# Patient Record
Sex: FEMALE | Race: WHITE | NOT HISPANIC OR LATINO | Employment: OTHER | URBAN - METROPOLITAN AREA
[De-identification: names, ages, dates, MRNs, and addresses within clinical notes are randomized per-mention and may not be internally consistent; named-entity substitution may affect disease eponyms.]

---

## 2017-01-25 ENCOUNTER — LAB VISIT (OUTPATIENT)
Dept: LAB | Facility: HOSPITAL | Age: 57
End: 2017-01-25
Attending: FAMILY MEDICINE
Payer: MEDICARE

## 2017-01-25 DIAGNOSIS — E11.9 TYPE II OR UNSPECIFIED TYPE DIABETES MELLITUS WITHOUT MENTION OF COMPLICATION, NOT STATED AS UNCONTROLLED: ICD-10-CM

## 2017-01-25 DIAGNOSIS — E03.9 HYPOTHYROIDISM, UNSPECIFIED TYPE: ICD-10-CM

## 2017-01-25 DIAGNOSIS — G89.4 CHRONIC PAIN SYNDROME: ICD-10-CM

## 2017-01-25 DIAGNOSIS — M75.121 COMPLETE TEAR OF RIGHT ROTATOR CUFF: ICD-10-CM

## 2017-01-25 DIAGNOSIS — M51.36 DDD (DEGENERATIVE DISC DISEASE), LUMBAR: ICD-10-CM

## 2017-01-25 DIAGNOSIS — F33.2 SEVERE EPISODE OF RECURRENT MAJOR DEPRESSIVE DISORDER, WITHOUT PSYCHOTIC FEATURES: ICD-10-CM

## 2017-01-25 LAB
ALBUMIN SERPL BCP-MCNC: 4.1 G/DL
ALP SERPL-CCNC: 76 U/L
ALT SERPL W/O P-5'-P-CCNC: 22 U/L
ANION GAP SERPL CALC-SCNC: 9 MMOL/L
AST SERPL-CCNC: 24 U/L
BASOPHILS # BLD AUTO: 0.04 K/UL
BASOPHILS NFR BLD: 0.6 %
BILIRUB SERPL-MCNC: 0.4 MG/DL
BUN SERPL-MCNC: 15 MG/DL
CALCIUM SERPL-MCNC: 9.7 MG/DL
CHLORIDE SERPL-SCNC: 101 MMOL/L
CHOLEST/HDLC SERPL: 4.3 {RATIO}
CO2 SERPL-SCNC: 30 MMOL/L
CREAT SERPL-MCNC: 0.9 MG/DL
DIFFERENTIAL METHOD: ABNORMAL
EOSINOPHIL # BLD AUTO: 0.2 K/UL
EOSINOPHIL NFR BLD: 3.3 %
ERYTHROCYTE [DISTWIDTH] IN BLOOD BY AUTOMATED COUNT: 13.7 %
EST. GFR  (AFRICAN AMERICAN): >60 ML/MIN/1.73 M^2
EST. GFR  (NON AFRICAN AMERICAN): >60 ML/MIN/1.73 M^2
GLUCOSE SERPL-MCNC: 93 MG/DL
HCT VFR BLD AUTO: 43.1 %
HDL/CHOLESTEROL RATIO: 23.4 %
HDLC SERPL-MCNC: 222 MG/DL
HDLC SERPL-MCNC: 52 MG/DL
HGB BLD-MCNC: 14.2 G/DL
LDLC SERPL CALC-MCNC: 136 MG/DL
LYMPHOCYTES # BLD AUTO: 2 K/UL
LYMPHOCYTES NFR BLD: 31.1 %
MCH RBC QN AUTO: 31.8 PG
MCHC RBC AUTO-ENTMCNC: 32.9 %
MCV RBC AUTO: 97 FL
MONOCYTES # BLD AUTO: 0.5 K/UL
MONOCYTES NFR BLD: 7.3 %
NEUTROPHILS # BLD AUTO: 3.6 K/UL
NEUTROPHILS NFR BLD: 57.5 %
NONHDLC SERPL-MCNC: 170 MG/DL
PLATELET # BLD AUTO: 304 K/UL
PMV BLD AUTO: 9.6 FL
POTASSIUM SERPL-SCNC: 4.3 MMOL/L
PROT SERPL-MCNC: 7.2 G/DL
RBC # BLD AUTO: 4.46 M/UL
SODIUM SERPL-SCNC: 140 MMOL/L
T4 FREE SERPL-MCNC: 0.82 NG/DL
TRIGL SERPL-MCNC: 170 MG/DL
TSH SERPL DL<=0.005 MIU/L-ACNC: 2.02 UIU/ML
WBC # BLD AUTO: 6.28 K/UL

## 2017-01-25 PROCEDURE — 36415 COLL VENOUS BLD VENIPUNCTURE: CPT | Mod: PO

## 2017-01-25 PROCEDURE — 80061 LIPID PANEL: CPT

## 2017-01-25 PROCEDURE — 83036 HEMOGLOBIN GLYCOSYLATED A1C: CPT

## 2017-01-25 PROCEDURE — 80053 COMPREHEN METABOLIC PANEL: CPT

## 2017-01-25 PROCEDURE — 85025 COMPLETE CBC W/AUTO DIFF WBC: CPT

## 2017-01-25 PROCEDURE — 84439 ASSAY OF FREE THYROXINE: CPT

## 2017-01-25 PROCEDURE — 84443 ASSAY THYROID STIM HORMONE: CPT

## 2017-01-26 LAB
ESTIMATED AVG GLUCOSE: 123 MG/DL
HBA1C MFR BLD HPLC: 5.9 %

## 2017-01-30 ENCOUNTER — OFFICE VISIT (OUTPATIENT)
Dept: INTERNAL MEDICINE | Facility: CLINIC | Age: 57
End: 2017-01-30
Payer: MEDICARE

## 2017-01-30 VITALS
HEIGHT: 62 IN | BODY MASS INDEX: 36.35 KG/M2 | TEMPERATURE: 98 F | SYSTOLIC BLOOD PRESSURE: 126 MMHG | HEART RATE: 66 BPM | WEIGHT: 197.56 LBS | DIASTOLIC BLOOD PRESSURE: 78 MMHG

## 2017-01-30 DIAGNOSIS — R73.09 ABNORMAL GLUCOSE: ICD-10-CM

## 2017-01-30 DIAGNOSIS — B35.4 TINEA CORPORIS: ICD-10-CM

## 2017-01-30 DIAGNOSIS — M51.36 DDD (DEGENERATIVE DISC DISEASE), LUMBAR: Primary | ICD-10-CM

## 2017-01-30 DIAGNOSIS — F17.200 SMOKER: ICD-10-CM

## 2017-01-30 DIAGNOSIS — E78.5 DYSLIPIDEMIA: ICD-10-CM

## 2017-01-30 DIAGNOSIS — I10 ESSENTIAL HYPERTENSION: ICD-10-CM

## 2017-01-30 DIAGNOSIS — Z12.11 COLON CANCER SCREENING: ICD-10-CM

## 2017-01-30 PROCEDURE — 99214 OFFICE O/P EST MOD 30 MIN: CPT | Mod: S$GLB,,, | Performed by: FAMILY MEDICINE

## 2017-01-30 PROCEDURE — 3078F DIAST BP <80 MM HG: CPT | Mod: S$GLB,,, | Performed by: FAMILY MEDICINE

## 2017-01-30 PROCEDURE — 3074F SYST BP LT 130 MM HG: CPT | Mod: S$GLB,,, | Performed by: FAMILY MEDICINE

## 2017-01-30 PROCEDURE — 1159F MED LIST DOCD IN RCRD: CPT | Mod: S$GLB,,, | Performed by: FAMILY MEDICINE

## 2017-01-30 PROCEDURE — 99499 UNLISTED E&M SERVICE: CPT | Mod: S$GLB,,, | Performed by: FAMILY MEDICINE

## 2017-01-30 PROCEDURE — 80307 DRUG TEST PRSMV CHEM ANLYZR: CPT

## 2017-01-30 PROCEDURE — 99999 PR PBB SHADOW E&M-EST. PATIENT-LVL III: CPT | Mod: PBBFAC,,, | Performed by: FAMILY MEDICINE

## 2017-01-30 RX ORDER — ALPRAZOLAM 0.5 MG/1
0.5 TABLET ORAL DAILY PRN
Qty: 30 TABLET | Refills: 2 | Status: SHIPPED | OUTPATIENT
Start: 2017-01-30 | End: 2017-07-25 | Stop reason: SDUPTHER

## 2017-01-30 RX ORDER — NYSTATIN AND TRIAMCINOLONE ACETONIDE 100000; 1 [USP'U]/G; MG/G
CREAM TOPICAL 4 TIMES DAILY
Qty: 60 G | Refills: 3 | Status: ON HOLD | OUTPATIENT
Start: 2017-01-30 | End: 2020-01-09 | Stop reason: HOSPADM

## 2017-01-30 RX ORDER — HYDROCODONE BITARTRATE AND ACETAMINOPHEN 10; 325 MG/1; MG/1
1 TABLET ORAL EVERY 8 HOURS PRN
Qty: 75 TABLET | Refills: 0 | Status: SHIPPED | OUTPATIENT
Start: 2017-01-30 | End: 2017-03-01 | Stop reason: SDUPTHER

## 2017-01-30 NOTE — PROGRESS NOTES
Patient, Melita Romero (MRN #[id number]), presented with a recorded BMI of 36.13 kg/m^2 and a documented comorbidity(s):  - Hypertension  - Hyperlipidemia  to which the severe obesity is a contributing factor. This is consistent with the definition of severe obesity (BMI 35.0-35.9) with comorbidity (ICD-10 E66.01, Z68.35). The patient's severe obesity was monitored, evaluated, addressed and/or treated. This addendum to the medical record is made on 01/30/2017.
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
## 2017-02-05 LAB
6MAM UR QL: NOT DETECTED
7AMINOCLONAZEPAM UR QL: NOT DETECTED
A-OH ALPRAZ UR QL: NOT DETECTED
ALPRAZ UR QL: NOT DETECTED
AMPHET UR QL SCN: NOT DETECTED
ANNOTATION COMMENT IMP: NORMAL
ANNOTATION COMMENT IMP: NORMAL
BARBITURATES UR QL: PRESENT
BUPRENORPHINE UR QL: NOT DETECTED
BZE UR QL: NOT DETECTED
CARBOXYTHC UR QL: NOT DETECTED
CARISOPRODOL UR QL: NOT DETECTED
CLONAZEPAM UR QL: NOT DETECTED
CODEINE UR QL: NOT DETECTED
CREAT UR-MCNC: 44.7 MG/DL (ref 20–400)
DIAZEPAM UR QL: NOT DETECTED
ETHYL GLUCURONIDE UR QL: NOT DETECTED
FENTANYL UR QL: NOT DETECTED
HYDROCODONE UR QL: PRESENT
HYDROMORPHONE UR QL: NOT DETECTED
LORAZEPAM UR QL: NOT DETECTED
MDA UR QL: NOT DETECTED
MDEA UR QL: NOT DETECTED
MDMA UR QL: NOT DETECTED
MEPERIDINE UR QL: NOT DETECTED
METHADONE UR QL: NOT DETECTED
METHAMPHET UR QL: NOT DETECTED
MIDAZOLAM UR QL SCN: NOT DETECTED
MORPHINE UR QL: NOT DETECTED
NORBUPRENORPHINE UR QL CFM: NOT DETECTED
NORDIAZEPAM UR QL: NOT DETECTED
NORFENTANYL UR QL: NOT DETECTED
NORHYDROCODONE UR QL CFM: PRESENT
NOROXYCODONE UR QL CFM: NOT DETECTED
OXAZEPAM UR QL: NOT DETECTED
OXYCODONE UR QL: NOT DETECTED
OXYMORPHONE UR QL: NOT DETECTED
OXYMORPHONE UR QL: NOT DETECTED
PATHOLOGY STUDY: NORMAL
PCP UR QL: NOT DETECTED
PHENTERMINE UR QL: NOT DETECTED
PPAA UR QL: NOT DETECTED
PROPOXYPH UR QL: NOT DETECTED
SERVICE CMNT-IMP: NORMAL
TAPENTADOL UR QL SCN: NOT DETECTED
TAPENTADOL UR QL SCN: NOT DETECTED
TEMAZEPAM UR QL: NOT DETECTED
TRAMADOL UR QL: NOT DETECTED
ZOLPIDEM UR QL: NOT DETECTED

## 2017-02-16 NOTE — PROGRESS NOTES
Ok she needs to stop taking his headache medication because it has shown up on her drug screen two times but I was uncertain what the cause was for the abnormality in the drug screen till now. What she is taking is a controlled substance, and if it shows up again in her next urine drug screen without having a prescription for it ( barbituates) then she won't be able to get any more of her pain meds from me or her xanax. Please inform.

## 2017-02-16 NOTE — PROGRESS NOTES
Please find out if the patient has been taking any fioricets or medictions with bubital in them for headaches. If so I need to add to her med list and find out where she has received them from.

## 2017-02-27 PROBLEM — E66.01 SEVERE OBESITY (BMI 35.0-39.9) WITH COMORBIDITY: Status: ACTIVE | Noted: 2017-02-27

## 2017-02-27 PROBLEM — E11.9 CONTROLLED TYPE 2 DIABETES MELLITUS WITHOUT COMPLICATION, WITHOUT LONG-TERM CURRENT USE OF INSULIN: Status: ACTIVE | Noted: 2017-02-27

## 2017-02-27 PROBLEM — M75.121 COMPLETE TEAR OF RIGHT ROTATOR CUFF: Status: ACTIVE | Noted: 2017-02-27

## 2017-02-27 PROBLEM — I51.89 LEFT VENTRICULAR DIASTOLIC DYSFUNCTION WITH PRESERVED SYSTOLIC FUNCTION: Status: ACTIVE | Noted: 2017-02-27

## 2017-03-01 RX ORDER — METFORMIN HYDROCHLORIDE 500 MG/1
500 TABLET, EXTENDED RELEASE ORAL 2 TIMES DAILY WITH MEALS
Qty: 60 TABLET | Refills: 5 | Status: SHIPPED | OUTPATIENT
Start: 2017-03-01 | End: 2017-09-26 | Stop reason: SDUPTHER

## 2017-03-01 RX ORDER — PREGABALIN 100 MG/1
100 CAPSULE ORAL 3 TIMES DAILY
Qty: 90 CAPSULE | Refills: 0 | Status: SHIPPED | OUTPATIENT
Start: 2017-03-01 | End: 2017-03-30 | Stop reason: SDUPTHER

## 2017-03-01 RX ORDER — HYDROCODONE BITARTRATE AND ACETAMINOPHEN 10; 325 MG/1; MG/1
1 TABLET ORAL EVERY 8 HOURS PRN
Qty: 75 TABLET | Refills: 0 | Status: SHIPPED | OUTPATIENT
Start: 2017-03-01 | End: 2017-03-30 | Stop reason: SDUPTHER

## 2017-03-31 RX ORDER — PREGABALIN 100 MG/1
100 CAPSULE ORAL 3 TIMES DAILY
Qty: 90 CAPSULE | Refills: 0 | Status: SHIPPED | OUTPATIENT
Start: 2017-03-31 | End: 2017-04-25 | Stop reason: SDUPTHER

## 2017-03-31 RX ORDER — HYDROCODONE BITARTRATE AND ACETAMINOPHEN 10; 325 MG/1; MG/1
1 TABLET ORAL EVERY 8 HOURS PRN
Qty: 75 TABLET | Refills: 0 | Status: SHIPPED | OUTPATIENT
Start: 2017-03-31 | End: 2017-04-25 | Stop reason: SDUPTHER

## 2017-04-21 RX ORDER — SERTRALINE HYDROCHLORIDE 100 MG/1
200 TABLET, FILM COATED ORAL DAILY
Qty: 180 TABLET | Refills: 3 | Status: SHIPPED | OUTPATIENT
Start: 2017-04-21 | End: 2017-04-25 | Stop reason: SDUPTHER

## 2017-04-25 ENCOUNTER — OFFICE VISIT (OUTPATIENT)
Dept: INTERNAL MEDICINE | Facility: CLINIC | Age: 57
End: 2017-04-25
Payer: MEDICARE

## 2017-04-25 VITALS
BODY MASS INDEX: 36.91 KG/M2 | HEART RATE: 76 BPM | TEMPERATURE: 99 F | WEIGHT: 208.31 LBS | DIASTOLIC BLOOD PRESSURE: 84 MMHG | HEIGHT: 63 IN | SYSTOLIC BLOOD PRESSURE: 124 MMHG

## 2017-04-25 DIAGNOSIS — G89.4 CHRONIC PAIN SYNDROME: ICD-10-CM

## 2017-04-25 DIAGNOSIS — M51.36 DDD (DEGENERATIVE DISC DISEASE), LUMBAR: Primary | ICD-10-CM

## 2017-04-25 DIAGNOSIS — E11.9 CONTROLLED TYPE 2 DIABETES MELLITUS WITHOUT COMPLICATION, WITHOUT LONG-TERM CURRENT USE OF INSULIN: ICD-10-CM

## 2017-04-25 DIAGNOSIS — E78.5 HYPERLIPIDEMIA, UNSPECIFIED HYPERLIPIDEMIA TYPE: ICD-10-CM

## 2017-04-25 DIAGNOSIS — F41.1 GAD (GENERALIZED ANXIETY DISORDER): ICD-10-CM

## 2017-04-25 DIAGNOSIS — F33.1 MODERATE EPISODE OF RECURRENT MAJOR DEPRESSIVE DISORDER: ICD-10-CM

## 2017-04-25 PROCEDURE — 99499 UNLISTED E&M SERVICE: CPT | Mod: S$GLB,,, | Performed by: FAMILY MEDICINE

## 2017-04-25 PROCEDURE — 3074F SYST BP LT 130 MM HG: CPT | Mod: S$GLB,,, | Performed by: FAMILY MEDICINE

## 2017-04-25 PROCEDURE — 1160F RVW MEDS BY RX/DR IN RCRD: CPT | Mod: S$GLB,,, | Performed by: FAMILY MEDICINE

## 2017-04-25 PROCEDURE — 99999 PR PBB SHADOW E&M-EST. PATIENT-LVL III: CPT | Mod: PBBFAC,,, | Performed by: FAMILY MEDICINE

## 2017-04-25 PROCEDURE — 3079F DIAST BP 80-89 MM HG: CPT | Mod: S$GLB,,, | Performed by: FAMILY MEDICINE

## 2017-04-25 PROCEDURE — 3044F HG A1C LEVEL LT 7.0%: CPT | Mod: S$GLB,,, | Performed by: FAMILY MEDICINE

## 2017-04-25 PROCEDURE — 4010F ACE/ARB THERAPY RXD/TAKEN: CPT | Mod: S$GLB,,, | Performed by: FAMILY MEDICINE

## 2017-04-25 PROCEDURE — 80307 DRUG TEST PRSMV CHEM ANLYZR: CPT

## 2017-04-25 PROCEDURE — 99214 OFFICE O/P EST MOD 30 MIN: CPT | Mod: S$GLB,,, | Performed by: FAMILY MEDICINE

## 2017-04-25 RX ORDER — PREGABALIN 100 MG/1
100 CAPSULE ORAL 3 TIMES DAILY
Qty: 90 CAPSULE | Refills: 5 | Status: SHIPPED | OUTPATIENT
Start: 2017-04-25 | End: 2017-11-06 | Stop reason: SDUPTHER

## 2017-04-25 RX ORDER — HYDROCODONE BITARTRATE AND ACETAMINOPHEN 10; 325 MG/1; MG/1
1 TABLET ORAL EVERY 8 HOURS PRN
Qty: 75 TABLET | Refills: 0 | Status: SHIPPED | OUTPATIENT
Start: 2017-04-29 | End: 2017-06-07 | Stop reason: SDUPTHER

## 2017-04-25 RX ORDER — SERTRALINE HYDROCHLORIDE 100 MG/1
200 TABLET, FILM COATED ORAL DAILY
Qty: 180 TABLET | Refills: 3 | Status: SHIPPED | OUTPATIENT
Start: 2017-04-25 | End: 2018-07-12 | Stop reason: SDUPTHER

## 2017-04-25 NOTE — PROGRESS NOTES
Subjective:      Patient ID: Melita Romero is a 56 y.o. female.    Chief Complaint: Follow-up (3 month )    HPI Comments: pt is a 56-year-old coming in today for followup of chronic issues.       NALLELY/MDD- stable lately.  Now in a mobile home.  Still on sertraline and Xanax.  States her last use of Xanax was either yesterday or 2 days ago.  Only using it intermittently.  Needing a refill of sertraline.      The patient had degenerative disc disease of the lumbar spine for several years now.  She has been well maintained on Norco anywhere from 2-3 tablets a day.  Needing UDS. Last one was positive for barbituates.  She reports that she did take some of her 's your set which had U Bhatti in it.  The last 3 months she has not taken any further.  She states she does not want her own prescription at this time for it.  For the most part the Norco seems to be helping her back pain and keeps her stable.  She has a rotator cuff tear on the right side and has recently rescheduled her appointment with orthopedics for next week with Dr. Delgado.  She is also on Lyrica which is helping which she takes 100 mg 3 times a day.    HTN- currently well controlled. Obese, still smoking.       She also has hyperlipidemia , on lipitor 40mg. No med problems.     She is obese with diabetes.  On metformin, needs improvement of diet.       Medication Refill   This is a chronic problem. The current episode started more than 1 year ago. The problem occurs 2 to 4 times per day. The problem has been unchanged. Associated symptoms include arthralgias, myalgias and neck pain. Pertinent negatives include no abdominal pain, anorexia, change in bowel habit, chest pain, chills, congestion, coughing, fatigue, headaches, nausea, numbness, rash, swollen glands or weakness. The symptoms are aggravated by bending, standing, twisting and walking. She has tried NSAIDs, ice, heat and acetaminophen (norco) for the symptoms. The treatment provided  significant relief.       Lab Results   Component Value Date    WBC 6.28 01/25/2017    HGB 14.2 01/25/2017    HCT 43.1 01/25/2017     01/25/2017    CHOL 222 (H) 01/25/2017    TRIG 170 (H) 01/25/2017    HDL 52 01/25/2017    ALT 22 01/25/2017    AST 24 01/25/2017     01/25/2017    K 4.3 01/25/2017     01/25/2017    CREATININE 0.9 01/25/2017    BUN 15 01/25/2017    CO2 30 (H) 01/25/2017    TSH 2.015 01/25/2017    HGBA1C 5.9 01/25/2017       Review of Systems   Constitutional: Negative for activity change, appetite change, chills and fatigue.   HENT: Negative for congestion.    Respiratory: Negative for cough and shortness of breath.    Cardiovascular: Negative for chest pain and palpitations.   Gastrointestinal: Negative for abdominal pain, anorexia, change in bowel habit, constipation and nausea.   Musculoskeletal: Positive for arthralgias, myalgias and neck pain.   Skin: Negative for rash.   Neurological: Negative for weakness, light-headedness, numbness and headaches.   Psychiatric/Behavioral: Negative for decreased concentration, dysphoric mood and sleep disturbance. The patient is nervous/anxious.      Objective:     Physical Exam   Constitutional: She appears well-developed and well-nourished.   MO WF   HENT:   Head: Normocephalic and atraumatic.   Right Ear: Tympanic membrane normal.   Left Ear: Tympanic membrane normal.   Mouth/Throat: Oropharynx is clear and moist.   Eyes: Conjunctivae and EOM are normal.   Neck: Normal range of motion. Neck supple.   Cardiovascular: Normal rate and regular rhythm.    Pulmonary/Chest: Effort normal and breath sounds normal.   Musculoskeletal:        Lumbar back: She exhibits decreased range of motion, tenderness and bony tenderness.   Psychiatric: She has a normal mood and affect. Her behavior is normal.   Nursing note and vitals reviewed.    Assessment:     1. DDD (degenerative disc disease), lumbar    2. NALLELY (generalized anxiety disorder)    3. Moderate  episode of recurrent major depressive disorder    4. Chronic pain syndrome    5. Hyperlipidemia, unspecified hyperlipidemia type    6. Controlled type 2 diabetes mellitus without complication, without long-term current use of insulin      Plan:   Melita was seen today for follow-up.    Diagnoses and all orders for this visit:    DDD (degenerative disc disease), lumbar- - stable, Continue with current medications and interventions. Labs ordered for UDS. Refilled meds of norco at #75 and lyrica for 6 mo     -     Pain Clinic Drug Screen  -     Hemoglobin A1c; Future  -     Lipid panel; Future  -     Comprehensive metabolic panel; Future  -     TSH; Future  -     T4, free; Future  -     CBC auto differential; Future    NALLELY (generalized anxiety disorder)- - stable, Continue with current medications and interventions. uds ordered. Last xanax use 1-2 days ago. Should be + for alprazolam.     -     Hemoglobin A1c; Future  -     Lipid panel; Future  -     Comprehensive metabolic panel; Future  -     TSH; Future  -     T4, free; Future  -     CBC auto differential; Future    Moderate episode of recurrent major depressive disorder - stable, Continue with current medications and interventions. Labs reviewed.     -     Hemoglobin A1c; Future  -     Lipid panel; Future  -     Comprehensive metabolic panel; Future  -     TSH; Future  -     T4, free; Future  -     CBC auto differential; Future    Chronic pain syndrome uds today. Refilled meds.   -     Pain Clinic Drug Screen  -     Hemoglobin A1c; Future  -     Lipid panel; Future  -     Comprehensive metabolic panel; Future  -     TSH; Future  -     T4, free; Future  -     CBC auto differential; Future    Hyperlipidemia, unspecified hyperlipidemia type  -     Hemoglobin A1c; Future  -     Lipid panel; Future  -     Comprehensive metabolic panel; Future  -     TSH; Future  -     T4, free; Future  -     CBC auto differential; Future    Controlled type 2 diabetes mellitus without  complication, without long-term current use of insulin- Labs prior to next office visit.     -     Hemoglobin A1c; Future  -     Lipid panel; Future  -     Comprehensive metabolic panel; Future  -     TSH; Future  -     T4, free; Future  -     CBC auto differential; Future    Other orders  -     sertraline (ZOLOFT) 100 MG tablet; Take 2 tablets (200 mg total) by mouth once daily.  -     pregabalin (LYRICA) 100 MG capsule; Take 1 capsule (100 mg total) by mouth 3 (three) times daily.  -     hydrocodone-acetaminophen 10-325mg (NORCO)  mg Tab; Take 1 tablet by mouth every 8 (eight) hours as needed.          Return in about 3 months (around 7/25/2017) for pain, dm, chol, bp.

## 2017-05-01 LAB
6MAM UR QL: NOT DETECTED
7AMINOCLONAZEPAM UR QL: NOT DETECTED
A-OH ALPRAZ UR QL: PRESENT
ALPRAZ UR QL: NOT DETECTED
AMPHET UR QL SCN: NOT DETECTED
ANNOTATION COMMENT IMP: NORMAL
ANNOTATION COMMENT IMP: NORMAL
BARBITURATES UR QL: NOT DETECTED
BUPRENORPHINE UR QL: NOT DETECTED
BZE UR QL: NOT DETECTED
CARBOXYTHC UR QL: NOT DETECTED
CARISOPRODOL UR QL: NOT DETECTED
CLONAZEPAM UR QL: NOT DETECTED
CODEINE UR QL: NOT DETECTED
CREAT UR-MCNC: 25.6 MG/DL (ref 20–400)
DIAZEPAM UR QL: NOT DETECTED
ETHYL GLUCURONIDE UR QL: NOT DETECTED
FENTANYL UR QL: NOT DETECTED
HYDROCODONE UR QL: PRESENT
HYDROMORPHONE UR QL: NOT DETECTED
LORAZEPAM UR QL: NOT DETECTED
MDA UR QL: NOT DETECTED
MDEA UR QL: NOT DETECTED
MDMA UR QL: NOT DETECTED
MEPERIDINE UR QL: NOT DETECTED
METHADONE UR QL: NOT DETECTED
METHAMPHET UR QL: NOT DETECTED
MIDAZOLAM UR QL SCN: NOT DETECTED
MORPHINE UR QL: NOT DETECTED
NORBUPRENORPHINE UR QL CFM: NOT DETECTED
NORDIAZEPAM UR QL: NOT DETECTED
NORFENTANYL UR QL: NOT DETECTED
NORHYDROCODONE UR QL CFM: PRESENT
NOROXYCODONE UR QL CFM: NOT DETECTED
OXAZEPAM UR QL: NOT DETECTED
OXYCODONE UR QL: NOT DETECTED
OXYMORPHONE UR QL: NOT DETECTED
OXYMORPHONE UR QL: NOT DETECTED
PATHOLOGY STUDY: NORMAL
PCP UR QL: NOT DETECTED
PHENTERMINE UR QL: NOT DETECTED
PPAA UR QL: NOT DETECTED
PROPOXYPH UR QL: NOT DETECTED
SERVICE CMNT-IMP: NORMAL
TAPENTADOL UR QL SCN: NOT DETECTED
TAPENTADOL UR QL SCN: NOT DETECTED
TEMAZEPAM UR QL: NOT DETECTED
TRAMADOL UR QL: NOT DETECTED
ZOLPIDEM UR QL: NOT DETECTED

## 2017-05-04 ENCOUNTER — TELEPHONE (OUTPATIENT)
Dept: INTERNAL MEDICINE | Facility: CLINIC | Age: 57
End: 2017-05-04

## 2017-05-04 NOTE — TELEPHONE ENCOUNTER
Called and got pt booked for pre op. She is having her testing done at [facility] on tomorrow and they will fax us results on Monday.

## 2017-05-04 NOTE — TELEPHONE ENCOUNTER
----- Message from Denisse Nevarez sent at 5/4/2017  8:21 AM CDT -----  Contact: Rea Lucia at Dr Tate office would like to schedule a surgery clearance,surgery is on the 16th....[phone]

## 2017-05-09 ENCOUNTER — OFFICE VISIT (OUTPATIENT)
Dept: INTERNAL MEDICINE | Facility: CLINIC | Age: 57
End: 2017-05-09
Payer: MEDICARE

## 2017-05-09 ENCOUNTER — HOSPITAL ENCOUNTER (OUTPATIENT)
Dept: RADIOLOGY | Facility: HOSPITAL | Age: 57
Discharge: HOME | End: 2017-05-09
Attending: FAMILY MEDICINE
Payer: MEDICARE

## 2017-05-09 VITALS
TEMPERATURE: 98 F | DIASTOLIC BLOOD PRESSURE: 80 MMHG | WEIGHT: 205.69 LBS | BODY MASS INDEX: 36.45 KG/M2 | HEART RATE: 78 BPM | HEIGHT: 63 IN | SYSTOLIC BLOOD PRESSURE: 122 MMHG

## 2017-05-09 DIAGNOSIS — Z01.818 PREOP EXAM FOR INTERNAL MEDICINE: Primary | ICD-10-CM

## 2017-05-09 DIAGNOSIS — G89.29 OTHER CHRONIC PAIN: ICD-10-CM

## 2017-05-09 DIAGNOSIS — E11.9 CONTROLLED TYPE 2 DIABETES MELLITUS WITHOUT COMPLICATION, WITHOUT LONG-TERM CURRENT USE OF INSULIN: ICD-10-CM

## 2017-05-09 DIAGNOSIS — E78.2 MIXED HYPERLIPIDEMIA: ICD-10-CM

## 2017-05-09 DIAGNOSIS — M75.121 COMPLETE TEAR OF RIGHT ROTATOR CUFF: ICD-10-CM

## 2017-05-09 DIAGNOSIS — F17.200 SMOKER: ICD-10-CM

## 2017-05-09 DIAGNOSIS — I10 ESSENTIAL HYPERTENSION: ICD-10-CM

## 2017-05-09 DIAGNOSIS — Z01.818 PREOP EXAM FOR INTERNAL MEDICINE: ICD-10-CM

## 2017-05-09 PROCEDURE — 99214 OFFICE O/P EST MOD 30 MIN: CPT | Mod: S$GLB,,, | Performed by: FAMILY MEDICINE

## 2017-05-09 PROCEDURE — 71020 XR CHEST PA AND LATERAL: CPT | Mod: 26,,, | Performed by: RADIOLOGY

## 2017-05-09 PROCEDURE — 3079F DIAST BP 80-89 MM HG: CPT | Mod: S$GLB,,, | Performed by: FAMILY MEDICINE

## 2017-05-09 PROCEDURE — 4010F ACE/ARB THERAPY RXD/TAKEN: CPT | Mod: S$GLB,,, | Performed by: FAMILY MEDICINE

## 2017-05-09 PROCEDURE — 71020 XR CHEST PA AND LATERAL: CPT | Mod: TC,PO

## 2017-05-09 PROCEDURE — 99499 UNLISTED E&M SERVICE: CPT | Mod: S$GLB,,, | Performed by: FAMILY MEDICINE

## 2017-05-09 PROCEDURE — 99999 PR PBB SHADOW E&M-EST. PATIENT-LVL III: CPT | Mod: PBBFAC,,, | Performed by: FAMILY MEDICINE

## 2017-05-09 PROCEDURE — 3044F HG A1C LEVEL LT 7.0%: CPT | Mod: S$GLB,,, | Performed by: FAMILY MEDICINE

## 2017-05-09 PROCEDURE — 1160F RVW MEDS BY RX/DR IN RCRD: CPT | Mod: S$GLB,,, | Performed by: FAMILY MEDICINE

## 2017-05-09 PROCEDURE — 3074F SYST BP LT 130 MM HG: CPT | Mod: S$GLB,,, | Performed by: FAMILY MEDICINE

## 2017-05-09 NOTE — PROGRESS NOTES
Subjective:      Patient ID: Melita Romero is a 56 y.o. female.    Chief Complaint: Pre-op Exam    HPI Comments: Pt is here for  preop clearance for right shoulder scope surgery by Dr Nima Delgado. Doing well. Scheduled for May 16th. No prior hx of blood clot, no PE, No CP, No SOB.  She's been battling pain and limitation of motion with her right shoulder for quite some time now.  The pain has gotten so severe to the point that she's now willing to pursue surgery.  Surgery scheduled for May 16.  She's chronically on hydrocodone 75 tablets per month that lately having to increase up to 3 times a day because of the significant amount of pain in her right shoulder.  She has a history of hypertension hyperlipidemia and diabetes control with metformin general anxiety and depression disorders as well.  She's a chronic smoker as well.  I reviewed her blood work done at the [facility] as well as her EKG she's needing do a chest x-ray due to her history of smoking.  Last chest x-ray was without any acute infections.      Lab Results   Component Value Date    WBC 6.28 01/25/2017    HGB 14.2 01/25/2017    HCT 43.1 01/25/2017     01/25/2017    CHOL 222 (H) 01/25/2017    TRIG 170 (H) 01/25/2017    HDL 52 01/25/2017    ALT 22 01/25/2017    AST 24 01/25/2017     01/25/2017    K 4.3 01/25/2017     01/25/2017    CREATININE 0.9 01/25/2017    BUN 15 01/25/2017    CO2 30 (H) 01/25/2017    TSH 2.015 01/25/2017    HGBA1C 5.9 01/25/2017       Review of Systems   Constitutional: Positive for activity change. Negative for unexpected weight change.   HENT: Negative for hearing loss, rhinorrhea and trouble swallowing.    Eyes: Negative for discharge and visual disturbance.   Respiratory: Negative for chest tightness and wheezing.    Cardiovascular: Negative for chest pain and palpitations.   Gastrointestinal: Negative for blood in stool, constipation, diarrhea and vomiting.   Endocrine: Negative for polydipsia and  polyuria.   Genitourinary: Negative for difficulty urinating, dysuria, hematuria and menstrual problem.   Musculoskeletal: Positive for arthralgias and neck pain. Negative for joint swelling and neck stiffness.   Neurological: Positive for weakness. Negative for headaches.   Psychiatric/Behavioral: Negative for confusion and dysphoric mood. The patient is nervous/anxious.      Objective:     Physical Exam   Constitutional: She appears well-developed and well-nourished.   HENT:   Head: Normocephalic and atraumatic.   Right Ear: Tympanic membrane normal.   Left Ear: Tympanic membrane normal.   Mouth/Throat: Oropharynx is clear and moist.   Eyes: Conjunctivae and EOM are normal.   Neck: Normal range of motion. Neck supple.   Cardiovascular: Normal rate and regular rhythm.    Pulmonary/Chest: Effort normal and breath sounds normal.   Musculoskeletal:        Right shoulder: She exhibits decreased range of motion, tenderness, bony tenderness, pain and spasm.   Psychiatric: She has a normal mood and affect. Her behavior is normal.   Nursing note and vitals reviewed.    Assessment:     1. Preop exam for internal medicine    2. Complete tear of right rotator cuff    3. Controlled type 2 diabetes mellitus without complication, without long-term current use of insulin    4. Essential hypertension    5. Mixed hyperlipidemia    6. Smoker    7. Other chronic pain      Plan:   Melita was seen today for pre-op exam.    Diagnoses and all orders for this visit:    Preop exam for internal medicine- I reviewed the patient's past medical, surgical, social and family history and with  physical exam findings and the proposed surgery and I make the following recommendations:     From a cardiac standpoint the patient is low risk for surgery with a low risk surgery. She has no evidence of cardiac symptomatology or cardiac diagnoses. The patient may proceed with surgery without further cardiac workup.     From a pulmonary standpoint the  patient presents as a good candidate as well. The patient has no history of lung disease or pulmonary symptoms. Good pulmonary toilet, incentive spirometry, early ambulation are all recommended to improve the  pulmonary outcome. No further pulmonary workup as noted prior to surgery.   DVT prophylaxis should be per standard. Venous compression devices are recommended. Early ambulation. Patient has been educated on signs and symptoms of both DVT and pulmonary embolus and instructed on what to do if there are symptoms postop.     The patient has been instructed to take  blood pressure medication the morning of surgery with a sip of water. Avoid any aspirin or anti-inflammatories between now and surgery.     If there is any further I can do to assist in the care of this patient please not hesitate to contact me. I will forward the lab results upon my receipt.      -     X-Ray Chest PA And Lateral; Future    Complete tear of right rotator cuff- going for scope by Dr Delgado next week.   -     X-Ray Chest PA And Lateral; Future    Controlled type 2 diabetes mellitus without complication, without long-term current use of insulin - - stable, Continue with current medications and interventions. Labs reviewed.       Essential hypertension- stable, Continue with current medications and interventions. Labs reviewed.       Mixed hyperlipidemia- stable, Continue with current medications and interventions. Labs reviewed.       Smoker- cxr today. Work on quiting /cutting back prior to surgery.   -     X-Ray Chest PA And Lateral; Future    Other chronic pain- advised pt to notify my office when she fills Rx from Dr Delgado on her norco for her surgery.             Return if symptoms worsen or fail to improve.

## 2017-06-07 RX ORDER — HYDROCODONE BITARTRATE AND ACETAMINOPHEN 10; 325 MG/1; MG/1
1 TABLET ORAL EVERY 8 HOURS PRN
Qty: 75 TABLET | Refills: 0 | Status: SHIPPED | OUTPATIENT
Start: 2017-06-07 | End: 2017-07-05 | Stop reason: SDUPTHER

## 2017-06-09 ENCOUNTER — CLINICAL SUPPORT (OUTPATIENT)
Dept: SMOKING CESSATION | Facility: CLINIC | Age: 57
End: 2017-06-09
Payer: COMMERCIAL

## 2017-06-09 DIAGNOSIS — F17.200 NICOTINE DEPENDENCE: Primary | ICD-10-CM

## 2017-06-09 PROCEDURE — 99407 BEHAV CHNG SMOKING > 10 MIN: CPT | Mod: S$GLB,,,

## 2017-06-09 NOTE — PROGRESS NOTES
Pt not ready to return to the program at this time. Contact information provided to schedule when ready.

## 2017-06-26 DIAGNOSIS — K21.00 GASTROESOPHAGEAL REFLUX DISEASE WITH ESOPHAGITIS: ICD-10-CM

## 2017-06-26 RX ORDER — ESOMEPRAZOLE MAGNESIUM 40 MG/1
40 CAPSULE, DELAYED RELEASE ORAL
Qty: 30 CAPSULE | Refills: 11 | Status: SHIPPED | OUTPATIENT
Start: 2017-06-26 | End: 2018-07-09 | Stop reason: SDUPTHER

## 2017-07-06 RX ORDER — HYDROCODONE BITARTRATE AND ACETAMINOPHEN 10; 325 MG/1; MG/1
1 TABLET ORAL EVERY 8 HOURS PRN
Qty: 75 TABLET | Refills: 0 | Status: SHIPPED | OUTPATIENT
Start: 2017-07-06 | End: 2017-08-08 | Stop reason: SDUPTHER

## 2017-07-06 RX ORDER — TIZANIDINE 4 MG/1
4 TABLET ORAL EVERY 6 HOURS PRN
Qty: 120 TABLET | Refills: 3 | Status: SHIPPED | OUTPATIENT
Start: 2017-07-06 | End: 2017-10-25 | Stop reason: SDUPTHER

## 2017-07-18 ENCOUNTER — LAB VISIT (OUTPATIENT)
Dept: LAB | Facility: HOSPITAL | Age: 57
End: 2017-07-18
Attending: FAMILY MEDICINE
Payer: MEDICARE

## 2017-07-18 DIAGNOSIS — E11.9 CONTROLLED TYPE 2 DIABETES MELLITUS WITHOUT COMPLICATION, WITHOUT LONG-TERM CURRENT USE OF INSULIN: ICD-10-CM

## 2017-07-18 DIAGNOSIS — M51.36 DDD (DEGENERATIVE DISC DISEASE), LUMBAR: ICD-10-CM

## 2017-07-18 DIAGNOSIS — G89.4 CHRONIC PAIN SYNDROME: ICD-10-CM

## 2017-07-18 DIAGNOSIS — F33.1 MODERATE EPISODE OF RECURRENT MAJOR DEPRESSIVE DISORDER: ICD-10-CM

## 2017-07-18 DIAGNOSIS — F41.1 GAD (GENERALIZED ANXIETY DISORDER): ICD-10-CM

## 2017-07-18 DIAGNOSIS — E78.5 HYPERLIPIDEMIA, UNSPECIFIED HYPERLIPIDEMIA TYPE: ICD-10-CM

## 2017-07-18 LAB
ALBUMIN SERPL BCP-MCNC: 4 G/DL
ALP SERPL-CCNC: 81 U/L
ALT SERPL W/O P-5'-P-CCNC: 19 U/L
ANION GAP SERPL CALC-SCNC: 11 MMOL/L
AST SERPL-CCNC: 21 U/L
BASOPHILS # BLD AUTO: 0.02 K/UL
BASOPHILS NFR BLD: 0.3 %
BILIRUB SERPL-MCNC: 0.5 MG/DL
BUN SERPL-MCNC: 19 MG/DL
CALCIUM SERPL-MCNC: 9.7 MG/DL
CHLORIDE SERPL-SCNC: 101 MMOL/L
CHOLEST/HDLC SERPL: 3.8 {RATIO}
CO2 SERPL-SCNC: 29 MMOL/L
CREAT SERPL-MCNC: 0.9 MG/DL
DIFFERENTIAL METHOD: ABNORMAL
EOSINOPHIL # BLD AUTO: 0.2 K/UL
EOSINOPHIL NFR BLD: 3.4 %
ERYTHROCYTE [DISTWIDTH] IN BLOOD BY AUTOMATED COUNT: 13.1 %
EST. GFR  (AFRICAN AMERICAN): >60 ML/MIN/1.73 M^2
EST. GFR  (NON AFRICAN AMERICAN): >60 ML/MIN/1.73 M^2
GLUCOSE SERPL-MCNC: 81 MG/DL
HCT VFR BLD AUTO: 42.9 %
HDL/CHOLESTEROL RATIO: 26.2 %
HDLC SERPL-MCNC: 168 MG/DL
HDLC SERPL-MCNC: 44 MG/DL
HGB BLD-MCNC: 14 G/DL
LDLC SERPL CALC-MCNC: 86.2 MG/DL
LYMPHOCYTES # BLD AUTO: 1.8 K/UL
LYMPHOCYTES NFR BLD: 26 %
MCH RBC QN AUTO: 31.2 PG
MCHC RBC AUTO-ENTMCNC: 32.6 %
MCV RBC AUTO: 96 FL
MONOCYTES # BLD AUTO: 0.5 K/UL
MONOCYTES NFR BLD: 7.6 %
NEUTROPHILS # BLD AUTO: 4.4 K/UL
NEUTROPHILS NFR BLD: 62.6 %
NONHDLC SERPL-MCNC: 124 MG/DL
PLATELET # BLD AUTO: 283 K/UL
PMV BLD AUTO: 9.9 FL
POTASSIUM SERPL-SCNC: 3.9 MMOL/L
PROT SERPL-MCNC: 7.1 G/DL
RBC # BLD AUTO: 4.49 M/UL
SODIUM SERPL-SCNC: 141 MMOL/L
T4 FREE SERPL-MCNC: 0.78 NG/DL
TRIGL SERPL-MCNC: 189 MG/DL
TSH SERPL DL<=0.005 MIU/L-ACNC: 2.99 UIU/ML
WBC # BLD AUTO: 7.07 K/UL

## 2017-07-18 PROCEDURE — 80061 LIPID PANEL: CPT

## 2017-07-18 PROCEDURE — 85025 COMPLETE CBC W/AUTO DIFF WBC: CPT

## 2017-07-18 PROCEDURE — 36415 COLL VENOUS BLD VENIPUNCTURE: CPT | Mod: PO

## 2017-07-18 PROCEDURE — 84439 ASSAY OF FREE THYROXINE: CPT

## 2017-07-18 PROCEDURE — 84443 ASSAY THYROID STIM HORMONE: CPT

## 2017-07-18 PROCEDURE — 80053 COMPREHEN METABOLIC PANEL: CPT

## 2017-07-18 PROCEDURE — 83036 HEMOGLOBIN GLYCOSYLATED A1C: CPT

## 2017-07-19 LAB
ESTIMATED AVG GLUCOSE: 108 MG/DL
HBA1C MFR BLD HPLC: 5.4 %

## 2017-07-25 ENCOUNTER — OFFICE VISIT (OUTPATIENT)
Dept: INTERNAL MEDICINE | Facility: CLINIC | Age: 57
End: 2017-07-25
Payer: MEDICARE

## 2017-07-25 VITALS
WEIGHT: 211 LBS | HEART RATE: 78 BPM | DIASTOLIC BLOOD PRESSURE: 80 MMHG | BODY MASS INDEX: 37.39 KG/M2 | HEIGHT: 63 IN | SYSTOLIC BLOOD PRESSURE: 120 MMHG | TEMPERATURE: 97 F

## 2017-07-25 DIAGNOSIS — M51.36 DDD (DEGENERATIVE DISC DISEASE), LUMBAR: ICD-10-CM

## 2017-07-25 DIAGNOSIS — F17.200 SMOKER: ICD-10-CM

## 2017-07-25 DIAGNOSIS — Z12.31 ENCOUNTER FOR SCREENING MAMMOGRAM FOR BREAST CANCER: ICD-10-CM

## 2017-07-25 DIAGNOSIS — F33.1 MODERATE EPISODE OF RECURRENT MAJOR DEPRESSIVE DISORDER: ICD-10-CM

## 2017-07-25 DIAGNOSIS — E11.9 CONTROLLED TYPE 2 DIABETES MELLITUS WITHOUT COMPLICATION, WITHOUT LONG-TERM CURRENT USE OF INSULIN: ICD-10-CM

## 2017-07-25 DIAGNOSIS — K21.9 GASTROESOPHAGEAL REFLUX DISEASE, ESOPHAGITIS PRESENCE NOT SPECIFIED: ICD-10-CM

## 2017-07-25 DIAGNOSIS — E55.9 VITAMIN D DEFICIENCY DISEASE: ICD-10-CM

## 2017-07-25 DIAGNOSIS — Z79.891 CHRONIC PRESCRIPTION OPIATE USE: ICD-10-CM

## 2017-07-25 DIAGNOSIS — F41.1 GAD (GENERALIZED ANXIETY DISORDER): ICD-10-CM

## 2017-07-25 DIAGNOSIS — E78.2 MIXED HYPERLIPIDEMIA: ICD-10-CM

## 2017-07-25 DIAGNOSIS — I10 ESSENTIAL HYPERTENSION: ICD-10-CM

## 2017-07-25 DIAGNOSIS — Z00.00 ROUTINE GENERAL MEDICAL EXAMINATION AT A HEALTH CARE FACILITY: Primary | ICD-10-CM

## 2017-07-25 DIAGNOSIS — M75.121 COMPLETE TEAR OF RIGHT ROTATOR CUFF: ICD-10-CM

## 2017-07-25 DIAGNOSIS — E66.01 SEVERE OBESITY (BMI 35.0-39.9) WITH COMORBIDITY: ICD-10-CM

## 2017-07-25 LAB
AMPHET+METHAMPHET UR QL: NEGATIVE
BARBITURATES UR QL SCN>200 NG/ML: NEGATIVE
BENZODIAZ UR QL SCN>200 NG/ML: NEGATIVE
BZE UR QL SCN: NEGATIVE
CANNABINOIDS UR QL SCN: NEGATIVE
CREAT UR-MCNC: 61 MG/DL
ETHANOL UR-MCNC: <10 MG/DL
METHADONE UR QL SCN>300 NG/ML: NEGATIVE
OPIATES UR QL SCN: NORMAL
PCP UR QL SCN>25 NG/ML: NEGATIVE
TOXICOLOGY INFORMATION: NORMAL

## 2017-07-25 PROCEDURE — 99396 PREV VISIT EST AGE 40-64: CPT | Mod: S$GLB,,, | Performed by: FAMILY MEDICINE

## 2017-07-25 PROCEDURE — 80307 DRUG TEST PRSMV CHEM ANLYZR: CPT

## 2017-07-25 PROCEDURE — 99999 PR PBB SHADOW E&M-EST. PATIENT-LVL III: CPT | Mod: PBBFAC,,, | Performed by: FAMILY MEDICINE

## 2017-07-25 RX ORDER — ALPRAZOLAM 0.5 MG/1
0.5 TABLET ORAL DAILY PRN
Qty: 30 TABLET | Refills: 2 | Status: SHIPPED | OUTPATIENT
Start: 2017-07-25 | End: 2018-11-08

## 2017-07-27 ENCOUNTER — PATIENT MESSAGE (OUTPATIENT)
Dept: INTERNAL MEDICINE | Facility: CLINIC | Age: 57
End: 2017-07-27

## 2017-08-04 ENCOUNTER — OFFICE VISIT (OUTPATIENT)
Dept: URGENT CARE | Facility: CLINIC | Age: 57
End: 2017-08-04
Payer: MEDICARE

## 2017-08-04 VITALS
TEMPERATURE: 100 F | WEIGHT: 208.75 LBS | SYSTOLIC BLOOD PRESSURE: 120 MMHG | BODY MASS INDEX: 36.98 KG/M2 | HEART RATE: 92 BPM | DIASTOLIC BLOOD PRESSURE: 80 MMHG | OXYGEN SATURATION: 94 %

## 2017-08-04 DIAGNOSIS — K02.9 DENTAL CARIES: Primary | ICD-10-CM

## 2017-08-04 DIAGNOSIS — R22.0 FACIAL SWELLING: ICD-10-CM

## 2017-08-04 PROCEDURE — 96372 THER/PROPH/DIAG INJ SC/IM: CPT | Mod: S$GLB,,, | Performed by: PHYSICIAN ASSISTANT

## 2017-08-04 PROCEDURE — 3079F DIAST BP 80-89 MM HG: CPT | Mod: S$GLB,,, | Performed by: PHYSICIAN ASSISTANT

## 2017-08-04 PROCEDURE — 99999 PR PBB SHADOW E&M-EST. PATIENT-LVL III: CPT | Mod: PBBFAC,,, | Performed by: PHYSICIAN ASSISTANT

## 2017-08-04 PROCEDURE — 3074F SYST BP LT 130 MM HG: CPT | Mod: S$GLB,,, | Performed by: PHYSICIAN ASSISTANT

## 2017-08-04 PROCEDURE — 3008F BODY MASS INDEX DOCD: CPT | Mod: S$GLB,,, | Performed by: PHYSICIAN ASSISTANT

## 2017-08-04 PROCEDURE — 99214 OFFICE O/P EST MOD 30 MIN: CPT | Mod: 25,S$GLB,, | Performed by: PHYSICIAN ASSISTANT

## 2017-08-04 RX ORDER — LIDOCAINE HYDROCHLORIDE 10 MG/ML
1 INJECTION INFILTRATION; PERINEURAL
Status: COMPLETED | OUTPATIENT
Start: 2017-08-04 | End: 2017-08-04

## 2017-08-04 RX ORDER — CEFTRIAXONE 1 G/1
1 INJECTION, POWDER, FOR SOLUTION INTRAMUSCULAR; INTRAVENOUS
Status: COMPLETED | OUTPATIENT
Start: 2017-08-04 | End: 2017-08-04

## 2017-08-04 RX ORDER — CLINDAMYCIN HYDROCHLORIDE 300 MG/1
300 CAPSULE ORAL EVERY 6 HOURS
Qty: 40 CAPSULE | Refills: 0 | Status: SHIPPED | OUTPATIENT
Start: 2017-08-04 | End: 2017-08-14

## 2017-08-04 RX ADMIN — LIDOCAINE HYDROCHLORIDE 1 ML: 10 INJECTION INFILTRATION; PERINEURAL at 07:08

## 2017-08-04 RX ADMIN — CEFTRIAXONE 1 G: 1 INJECTION, POWDER, FOR SOLUTION INTRAMUSCULAR; INTRAVENOUS at 07:08

## 2017-08-05 NOTE — PATIENT INSTRUCTIONS
"  Dental Cavity    A dental cavity is a pit or crater in the surface of a tooth. This exposes the sensitive inner layer of the tooth and causes pain. If the cavity isnt treated, it will get bigger. It may enter the pulp and cause an infection or abscess in the bone at the root end (apex) of the tooth. An infection in the tooth is a much more serious problem than a cavity. If the tooth gets infected, you will need a root canal or the entire tooth taken out (extraction).  The pain in your tooth may be made worse by eating sweets or drinking hot or cold beverages. It may spread from the tooth to your ear or the area of your jaw on the same side.  Home care  Follow these tips when caring for yourself at home:  · Avoid sweets and hot and cold foods and drinks. Your tooth may be sensitive to changes in temperature.  · If your tooth is chipped or cracked, or if there is a large open cavity, put oil of cloves directly on the tooth to relieve pain. You can buy oil of cloves at drugstores. Some pharmacies carry an over-the-counter "toothache kit." This contains a paste that you can put on the exposed tooth to make it less sensitive.  · Put a cold pack on your jaw over the sore area to help reduce pain.  · You may use over-the-counter medicine to ease pain, unless another medicine was prescribed. If you have chronic liver or kidney disease, talk with your healthcare provider before using acetaminophen or ibuprofen. Also talk with your provider if youve had a stomach ulcer or GI bleeding.  · If you have signs of an infection, you will be given an antibiotic. Take it as directed.  Follow-up care  Follow up with your dentist, or as advised. Your pain may go away with the treatment given today. But only a dentist can fully look at and treat this problem to prevent further tooth damage.  Call 911  Call 911 if any of these occur:  · Difficulty swallowing or breathing  · Weakness or fainting  · Unusual drowsiness  · Headache or " stiff neck  When to seek medical advice  Call your healthcare provider right away if any of these occur:  · Redness or swelling of the face  · Pain gets worse or spreads to your neck  · Fever of 100.5 °F (38°C) or higher, or as directed by your healthcare provider  · Pus drains from the tooth or gum  Date Last Reviewed: 10/1/2016  © 8635-3131 [employer]. [address] [place] [address]. All rights reserved. This information is not intended as a substitute for professional medical care. Always follow your healthcare professional's instructions.        Periorbital Cellulitis  Periorbital cellulitis is an infection of the tissues around the eye. It is most often caused by an infected scratch or insect bite. Sometimes a sinus infection can cause this problem.  Home care  The following are general care guidelines:  1. Take your antibiotic medicine exactly as directed, until it is finished.  2. You may use over-the-counter medicine as directed based on age and weight to help with pain and fever, unless another pain medicine was given. If you have liver disease or ever had a stomach ulcer, talk with your healthcare provider before using these medicines. Do not use ibuprofen in children under 6 months of age. Aspirin should never be used in anyone under 18 years of age who is ill with a fever. It may cause severe illness or death.  Follow-up care  Follow up with your healthcare provider, or as advised.  When to seek medical advice  Call your healthcare provider right away if any of these occur:  · Increasing swelling or pain around the eye  · Increasing redness  · Changes in vision  · Fever of 100.4 (38º C) oral or 101.5 (38.6º C) rectal for more than 2 days on antibiotics  Date Last Reviewed: 6/1/2016 © 2000-2016 [employer]. [address] [place] [address]. All rights reserved. This information is not intended as a substitute for professional medical care. Always follow  your healthcare professional's instructions.

## 2017-08-05 NOTE — PROGRESS NOTES
Subjective:      Patient ID: Melita Romero is a 56 y.o. female.    Chief Complaint: Facial Swelling    Ms. Romero is 57yo female that presents to Urgent Care for L sided facial swelling.  Patient states she had some pain last night but noticed the swelling when she woke up this morning.  Patient denies changes to medication or consuming new foods, denies any new face products. Patient denies any recent dental work, and states she does currently have dental caries.      Facial Pain   This is a new problem. The current episode started yesterday (Swelling started today). Associated symptoms include headaches. Pertinent negatives include no chills, coughing, diaphoresis, fever or weakness. Treatments tried: Norco. The treatment provided mild relief.     Review of Systems   Constitutional: Negative for chills, diaphoresis and fever.   HENT: Negative for ear pain.    Eyes: Negative for photophobia, pain, discharge, redness, itching and visual disturbance.   Respiratory: Negative for cough, shortness of breath and wheezing.    Neurological: Positive for headaches. Negative for dizziness, speech difficulty, weakness and light-headedness.   Psychiatric/Behavioral: Negative for confusion.       Objective:   /80 (BP Location: Right arm, Patient Position: Sitting, BP Method: Manual)   Pulse 92   Temp 99.5 °F (37.5 °C) (Tympanic)   Wt 94.7 kg (208 lb 12.4 oz)   SpO2 (!) 94%   BMI 36.98 kg/m²   Physical Exam   Constitutional: She appears well-developed and well-nourished. She does not appear ill. No distress.   HENT:   Head: Normocephalic and atraumatic.   Right Ear: Tympanic membrane and ear canal normal. Tympanic membrane is not erythematous. No middle ear effusion.   Left Ear: Tympanic membrane and ear canal normal. Tympanic membrane is not erythematous.  No middle ear effusion.   Nose: Nose normal. No mucosal edema. Right sinus exhibits no maxillary sinus tenderness and no frontal sinus tenderness. Left  sinus exhibits no maxillary sinus tenderness and no frontal sinus tenderness.   Mouth/Throat: Uvula is midline and oropharynx is clear and moist. Dental caries present. No oropharyngeal exudate or posterior oropharyngeal erythema.   Rampant decay generalized throughout oral cavity, tooth #12 (L 1st premolar) broken at gumline with #13 with active decay and surrounding swelling of gingiva   Facial swelling to L side of face   Eyes: Conjunctivae, EOM and lids are normal. Pupils are equal, round, and reactive to light.   Mild to moderate swelling with erythema inferior to lower eyelid  Non-TTP   Cardiovascular: Normal rate, regular rhythm and normal heart sounds.    No murmur heard.  Pulmonary/Chest: Effort normal and breath sounds normal. No respiratory distress. She has no decreased breath sounds. She has no wheezes. She has no rhonchi. She has no rales.   Skin: Skin is warm. No abrasion, no bruising, no lesion and no rash noted. She is diaphoretic (patient states this is her norm). There is erythema (face).   Psychiatric: She has a normal mood and affect. Her speech is normal and behavior is normal. Thought content normal.     Assessment:      1. Dental caries    2. Facial swelling       Plan:   Dental caries  -     cefTRIAXone injection 1 g; Inject 1 g into the muscle one time.  -     lidocaine HCL 10 mg/ml (1%) injection 1 mL; 1 mL by Other route one time.  -     clindamycin (CLEOCIN) 300 MG capsule; Take 1 capsule (300 mg total) by mouth every 6 (six) hours.  Dispense: 40 capsule; Refill: 0    Facial swelling    Gave patient handout on dental caries and odalys-orbital cellulitis (signs/symptoms to look for).  Printed and reviewed AVS.    With ANY increased swelling/pain/redness, vision disturbances, fever, chills, cold sweats, go to the ER.    Patient's pulse ox ranging from 93-95% throughout time in clinic.  Patient denies any respiratory symptoms at this time.  With any new respiratory symptoms (cough, wheezing,  shortness of breath), go to the ER.     Take antibiotics for entire course.  Do not save medications for later, all medications must be taken for full regimen.   Advise patient to follow up with dentist on Monday.      Patient expresses understanding and agrees with treatment plan.

## 2017-08-08 RX ORDER — HYDROCODONE BITARTRATE AND ACETAMINOPHEN 10; 325 MG/1; MG/1
1 TABLET ORAL EVERY 8 HOURS PRN
Qty: 75 TABLET | Refills: 0 | Status: SHIPPED | OUTPATIENT
Start: 2017-08-08

## 2017-08-08 RX ORDER — HYDROCODONE BITARTRATE AND ACETAMINOPHEN 10; 325 MG/1; MG/1
1 TABLET ORAL EVERY 8 HOURS PRN
Qty: 75 TABLET | Refills: 0 | OUTPATIENT
Start: 2017-08-08

## 2017-08-31 ENCOUNTER — TELEPHONE (OUTPATIENT)
Dept: INTERNAL MEDICINE | Facility: CLINIC | Age: 57
End: 2017-08-31

## 2017-08-31 NOTE — TELEPHONE ENCOUNTER
----- Message from Savi Bob sent at 8/31/2017  3:58 PM CDT -----  Contact: [email or web address]-  Would like to consult with nurse regarding status of physicians statement. Please call back at [phone].      Thanks,  Savi Bob

## 2017-09-07 ENCOUNTER — TELEPHONE (OUTPATIENT)
Dept: INTERNAL MEDICINE | Facility: CLINIC | Age: 57
End: 2017-09-07

## 2017-09-07 RX ORDER — HYDROCODONE BITARTRATE AND ACETAMINOPHEN 10; 325 MG/1; MG/1
1 TABLET ORAL EVERY 8 HOURS PRN
Qty: 75 TABLET | Refills: 0 | OUTPATIENT
Start: 2017-09-07

## 2017-09-07 RX ORDER — HYDROCODONE BITARTRATE AND ACETAMINOPHEN 10; 325 MG/1; MG/1
1 TABLET ORAL EVERY 8 HOURS PRN
Qty: 75 TABLET | Refills: 0 | Status: CANCELLED | OUTPATIENT
Start: 2017-09-07

## 2017-09-07 NOTE — TELEPHONE ENCOUNTER
Pt has gotten 3 prescriptions from dr jaqueline gonzalez for percocet. She did have shoulder surgery in early July but there is no indication for need for both percocet and hydrocodone. Last percocet prescription was from 7/28/2017 for 40 pills and then had a hydrocodone rx from me for 75 on 8/8/2017. No further hydrocodone at this time until we discuss in office. Need to see OV from Dr Delgado as to why she still needs both. Can work in as UC visit next week.

## 2017-09-08 ENCOUNTER — PATIENT MESSAGE (OUTPATIENT)
Dept: INTERNAL MEDICINE | Facility: CLINIC | Age: 57
End: 2017-09-08

## 2017-09-08 ENCOUNTER — TELEPHONE (OUTPATIENT)
Dept: INTERNAL MEDICINE | Facility: CLINIC | Age: 57
End: 2017-09-08

## 2017-09-08 NOTE — TELEPHONE ENCOUNTER
----- Message from Shirley Varela sent at 9/8/2017  9:28 AM CDT -----  Please call pt back at [phone] in regards to pt miss call...

## 2017-09-11 ENCOUNTER — OFFICE VISIT (OUTPATIENT)
Dept: INTERNAL MEDICINE | Facility: CLINIC | Age: 57
End: 2017-09-11
Payer: MEDICARE

## 2017-09-11 VITALS
WEIGHT: 210.75 LBS | SYSTOLIC BLOOD PRESSURE: 142 MMHG | TEMPERATURE: 98 F | BODY MASS INDEX: 37.34 KG/M2 | DIASTOLIC BLOOD PRESSURE: 84 MMHG | HEART RATE: 68 BPM | HEIGHT: 63 IN

## 2017-09-11 DIAGNOSIS — M51.36 DDD (DEGENERATIVE DISC DISEASE), LUMBAR: ICD-10-CM

## 2017-09-11 DIAGNOSIS — G89.4 CHRONIC PAIN SYNDROME: Primary | ICD-10-CM

## 2017-09-11 DIAGNOSIS — F41.1 GAD (GENERALIZED ANXIETY DISORDER): ICD-10-CM

## 2017-09-11 DIAGNOSIS — M75.121 COMPLETE TEAR OF RIGHT ROTATOR CUFF: ICD-10-CM

## 2017-09-11 DIAGNOSIS — F33.1 MODERATE EPISODE OF RECURRENT MAJOR DEPRESSIVE DISORDER: ICD-10-CM

## 2017-09-11 PROCEDURE — 3008F BODY MASS INDEX DOCD: CPT | Mod: S$GLB,,, | Performed by: FAMILY MEDICINE

## 2017-09-11 PROCEDURE — 99214 OFFICE O/P EST MOD 30 MIN: CPT | Mod: S$GLB,,, | Performed by: FAMILY MEDICINE

## 2017-09-11 PROCEDURE — 99999 PR PBB SHADOW E&M-EST. PATIENT-LVL IV: CPT | Mod: PBBFAC,,, | Performed by: FAMILY MEDICINE

## 2017-09-11 PROCEDURE — 99499 UNLISTED E&M SERVICE: CPT | Mod: S$GLB,,, | Performed by: FAMILY MEDICINE

## 2017-09-11 PROCEDURE — 3079F DIAST BP 80-89 MM HG: CPT | Mod: S$GLB,,, | Performed by: FAMILY MEDICINE

## 2017-09-11 PROCEDURE — 3077F SYST BP >= 140 MM HG: CPT | Mod: S$GLB,,, | Performed by: FAMILY MEDICINE

## 2017-09-11 NOTE — PROGRESS NOTES
Subjective:      Patient ID: Melita Romero is a 56 y.o. female.    Chief Complaint: other (discuss medicines)    Disclaimer:  This note is prepared using voice recognition software and as such is likely to have errors and has not been proof read. Please contact me for questions.     Patient's coming in today to discuss her prescriptioncontrolled medications.  In the past she's been controlled with hydrocodone at 75 pills per month.  We had her do periodic urine drug screening.  She is not only on hydrocodone but also Lyrica and Xanax that is been filled through my office.  Recently in May she had a shoulder surgery done by Dr. Delgado for a complete rotator cuff tear.  She did report to me prior to seeing him that she was going to be getting Percocet filled for the shoulder surgery.  Improved her for the one time prescription for these shoulder repair however in the interim the patient has received 2 additional prescriptions from Dr. Tate's office for Percocet while at the same time continue to get her hydrocodone filled with 75 pills.  She did not inform us that she was doing so.  She reports that she still been in pain with her right shoulder even since surgery.  She thought that the doctor's office would have checked with us to make sure this was an okay practice.  I informed her this was not the case.  She is to take control and to know that if she's getting 1 control pain medication from one doctor that she's not asked from it from another doctor.  She last got the Percocet prescription from Dr. Tate's office on July 28 she then turned around and asked for a hydrocodone prescription from our office on August 8.  Upon this request I have denied it for her.  I'm bringing her into the office today to discuss her concerns as well as her issues and the need for her to now get in pain management.  If she is still having pain and difficulty with the right shoulder after surgery she needs to be seeing a pain  specialist.  She's willing to do so.  She is also currently on Lyrica as well.  She'll have to start getting this to the pain specialist also.    On her last urine drug screen as well as she tested negative for benzos.  She reports she had taken the alprazolam one hour priorto coming in for the urine drug screen.  Upon her fill rates she appears to be getting Xanax 30 pills every 2 months.  She states she intermittently uses it however it should have been and her system.  It was not in the urine drug screen however aced on her history and her being notified I went ahead and filled the Xanax prescription for that time.  However now with this multiple's prescribing of the Percocet as well as a hydrocodone and now with the inconsistencies with her urine drug screen she is now going to have to start seeing specialist through the pain management office for opiates as well as seeing her psychologist again to get Xanax filled.  I'm happy to take care of the patient for her other medical needs as long as it does not involve controlled substances.  The patient also brings up the fact that previously she had an abnormal urine drug screen in the past as well which showed a positive for barbiturates.  This was because the patient had taken one of her husbands Fioricets without notifying our office as well.  This was once again brought up that this is the third incidence that there is been inconsistencies with her drug monitoring.  She has only been receiving however controlled substances through my office and Dr. Tate.  I think she be a good candidate for pain management group however I'm not willing to give her chronic Percocet.        Lab Results   Component Value Date    WBC 7.07 07/18/2017    HGB 14.0 07/18/2017    HCT 42.9 07/18/2017     07/18/2017    CHOL 168 07/18/2017    TRIG 189 (H) 07/18/2017    HDL 44 07/18/2017    ALT 19 07/18/2017    AST 21 07/18/2017     07/18/2017    K 3.9 07/18/2017      07/18/2017    CREATININE 0.9 07/18/2017    BUN 19 07/18/2017    CO2 29 07/18/2017    TSH 2.994 07/18/2017    HGBA1C 5.4 07/18/2017       Review of Systems   Constitutional: Negative for activity change and unexpected weight change.   HENT: Negative for hearing loss, rhinorrhea and trouble swallowing.    Eyes: Negative for discharge and visual disturbance.   Respiratory: Negative for chest tightness and wheezing.    Cardiovascular: Negative for chest pain and palpitations.   Gastrointestinal: Negative for blood in stool, constipation, diarrhea and vomiting.   Endocrine: Negative for polydipsia and polyuria.   Genitourinary: Negative for difficulty urinating, dysuria, hematuria and menstrual problem.   Musculoskeletal: Positive for arthralgias and neck pain. Negative for joint swelling.   Neurological: Positive for headaches. Negative for weakness.   Psychiatric/Behavioral: Positive for dysphoric mood. Negative for confusion.     Objective:     Physical Exam   Constitutional: She appears well-developed and well-nourished. She appears distressed.   Psychiatric: Judgment and thought content normal. Her mood appears anxious. Her affect is angry. Her speech is rapid and/or pressured.     Assessment:     1. Chronic pain syndrome    2. Complete tear of right rotator cuff    3. DDD (degenerative disc disease), lumbar    4. Moderate episode of recurrent major depressive disorder    5. NALLELY (generalized anxiety disorder)      Plan:   Melita was seen today for other.    Diagnoses and all orders for this visit:    Chronic pain syndrome-noted patient's been getting prescriptions with hydrocodone and Percocet.  Comments:  pt filled 3 prescriptions with ortho for percocet without making PCP MD aware one prescription I was made aware of due to her surgery.  The other 2 were after surgeries.. pt will be referred to pain clinic for further pain management. She will need to now get all controlled substances through pain  management.  Orders:  -     Cancel: Ambulatory referral to Pain Clinic  -     Ambulatory referral to Pain Clinic    Complete tear of right rotator cuff-still with issues advised patient to refer her back to her orthopedist to discuss additional interventions.  Also now needs to see pain management clinic.  -     Ambulatory referral to Pain Clinic    DDD (degenerative disc disease), lumbar-see above we'll need to start seeing pain management clinic  -     Ambulatory referral to Pain Clinic    Moderate episode of recurrent major depressive disorder-patient will now need to start getting her Xanax filled through her psychologist Dr. Marquez.  We'll refer her back.  No further controlled substances to my office.  This included Xanax, Lyrica, hydrocodone, and any additional medications in the future that would be a controlled substance.  -     Cancel: Ambulatory referral to Psychology  -     Ambulatory referral to Psychology    NALLELY (generalized anxiety disorder)-patient will now need to start getting her Xanax filled through her psychologist Dr. Marquez.  We'll refer her back.  No further controlled substances to my office.  This included Xanax, Lyrica, hydrocodone, and any additional medications in the future that would be a controlled substance.    -     Cancel: Ambulatory referral to Psychology  -     Ambulatory referral to Psychology            Return if symptoms worsen or fail to improve.

## 2017-09-20 ENCOUNTER — PATIENT MESSAGE (OUTPATIENT)
Dept: INTERNAL MEDICINE | Facility: CLINIC | Age: 57
End: 2017-09-20

## 2017-09-20 ENCOUNTER — TELEPHONE (OUTPATIENT)
Dept: INTERNAL MEDICINE | Facility: CLINIC | Age: 57
End: 2017-09-20

## 2017-09-20 NOTE — TELEPHONE ENCOUNTER
Called and advised patient that referrals were faxed and that [facility] tried contacting her in refrence to a financial matter she needed to call and address prior to scheduleing and patient expressed understanding. She also verbalized still having copys of the referrals that we gave her in hand and I advised her that if [facility] still say they are not receiving referral then she can hand deliver it to them. Patient expressed that she will try calling and expressed understanding.[email or web address]

## 2017-09-26 RX ORDER — METFORMIN HYDROCHLORIDE 500 MG/1
TABLET, EXTENDED RELEASE ORAL
Qty: 60 TABLET | Refills: 5 | Status: SHIPPED | OUTPATIENT
Start: 2017-09-26 | End: 2018-04-02 | Stop reason: SDUPTHER

## 2017-09-27 ENCOUNTER — HOSPITAL ENCOUNTER (OUTPATIENT)
Dept: RADIOLOGY | Facility: HOSPITAL | Age: 57
Discharge: HOME | End: 2017-09-27
Attending: FAMILY MEDICINE
Payer: MEDICARE

## 2017-09-27 ENCOUNTER — OFFICE VISIT (OUTPATIENT)
Dept: OPHTHALMOLOGY | Facility: CLINIC | Age: 57
End: 2017-09-27
Payer: MEDICARE

## 2017-09-27 VITALS — HEIGHT: 63 IN | BODY MASS INDEX: 37.21 KG/M2 | WEIGHT: 210 LBS

## 2017-09-27 DIAGNOSIS — E11.9 DIABETES MELLITUS TYPE 2 WITHOUT RETINOPATHY: Primary | ICD-10-CM

## 2017-09-27 DIAGNOSIS — H52.7 REFRACTIVE ERROR: ICD-10-CM

## 2017-09-27 DIAGNOSIS — Z12.31 ENCOUNTER FOR SCREENING MAMMOGRAM FOR BREAST CANCER: ICD-10-CM

## 2017-09-27 DIAGNOSIS — Z13.5 GLAUCOMA SCREENING: ICD-10-CM

## 2017-09-27 PROCEDURE — 92014 COMPRE OPH EXAM EST PT 1/>: CPT | Mod: S$GLB,,, | Performed by: OPTOMETRIST

## 2017-09-27 PROCEDURE — 77067 SCR MAMMO BI INCL CAD: CPT | Mod: 26,,, | Performed by: RADIOLOGY

## 2017-09-27 PROCEDURE — 99999 PR PBB SHADOW E&M-EST. PATIENT-LVL II: CPT | Mod: PBBFAC,,, | Performed by: OPTOMETRIST

## 2017-09-27 PROCEDURE — 92015 DETERMINE REFRACTIVE STATE: CPT | Mod: S$GLB,,, | Performed by: OPTOMETRIST

## 2017-09-27 PROCEDURE — 77067 SCR MAMMO BI INCL CAD: CPT | Mod: TC

## 2017-09-27 PROCEDURE — 99499 UNLISTED E&M SERVICE: CPT | Mod: S$GLB,,, | Performed by: OPTOMETRIST

## 2017-09-27 NOTE — PROGRESS NOTES
HPI     Last TRF exam 02/23/2016  Diabetic/NIDDM  Screening for glaucoma  RE  No visual complaints    Last edited by Cristhian Hylton MA on 9/27/2017 10:39 AM. (History)            Assessment /Plan     For exam results, see Encounter Report.    Diabetes mellitus type 2 without retinopathy    Glaucoma screening    Refractive error      No diabetic retinopathy OU.  OH OK OU otherwise.  Spec Rx given.  RTC one year.

## 2017-09-27 NOTE — LETTER
September 27, 2017      Janett Mccauley MD  [address] [facility]  [address]  [place] [address]           [facility] - Ophthalmology  [address]  [place] [address]  Phone: [phone]  Fax: [phone]          Patient: Melita Romero   MR Number: [id number]   YOB: 1960   Date of Visit: 9/27/2017       Dear Dr. Janett Mccauley:    Thank you for referring Melita Romero to me for evaluation. Attached you will find relevant portions of my assessment and plan of care.    If you have questions, please do not hesitate to call me. I look forward to following Melita Romero along with you.    Sincerely,    ERNA Duncan, OD    Enclosure  CC:  No Recipients    If you would like to receive this communication electronically, please contact [email or web address] or [phone] to request more information on [employer] access.    For providers and/or their staff who would like to refer a patient to [facility], please contact us through our one-stop-shop provider referral line, [facility] , at [phone].    If you feel you have received this communication in error or would no longer like to receive these types of communications, please e-mail [email or web address]

## 2017-09-29 ENCOUNTER — TELEPHONE (OUTPATIENT)
Dept: INTERNAL MEDICINE | Facility: CLINIC | Age: 57
End: 2017-09-29

## 2017-09-29 NOTE — TELEPHONE ENCOUNTER
----- Message from Sofi Gibson sent at 9/29/2017 12:03 PM CDT -----  Pt said as of today Janett with [facility] is stating they still never received a referral.  Pt wanted to see if the referral could be sent to them asap to their  Fax number  [phone]. Pt said Janett is also needing paperwork as to why she's being referred.    Please call pt at [phone] to confirm the referral and paperwork  has been sent over.

## 2017-09-29 NOTE — TELEPHONE ENCOUNTER
Called and left message that I have re faxed this referral , demographics, and insurance info.  It has on the referral the reason for her referral.

## 2017-10-22 RX ORDER — ATORVASTATIN CALCIUM 40 MG/1
40 TABLET, FILM COATED ORAL DAILY
Qty: 90 TABLET | Refills: 3 | Status: SHIPPED | OUTPATIENT
Start: 2017-10-22 | End: 2018-11-08 | Stop reason: SDUPTHER

## 2017-10-22 RX ORDER — LOSARTAN POTASSIUM AND HYDROCHLOROTHIAZIDE 25; 100 MG/1; MG/1
1 TABLET ORAL DAILY
Qty: 90 TABLET | Refills: 3 | Status: SHIPPED | OUTPATIENT
Start: 2017-10-22 | End: 2017-11-06 | Stop reason: SDUPTHER

## 2017-10-25 RX ORDER — TIZANIDINE 4 MG/1
4 TABLET ORAL EVERY 6 HOURS PRN
Qty: 120 TABLET | Refills: 3 | Status: SHIPPED | OUTPATIENT
Start: 2017-10-25 | End: 2018-02-28 | Stop reason: SDUPTHER

## 2017-10-26 ENCOUNTER — IMMUNIZATION (OUTPATIENT)
Dept: INTERNAL MEDICINE | Facility: CLINIC | Age: 57
End: 2017-10-26
Payer: MEDICARE

## 2017-10-26 PROCEDURE — G0008 ADMIN INFLUENZA VIRUS VAC: HCPCS | Mod: S$GLB,,, | Performed by: FAMILY MEDICINE

## 2017-10-26 PROCEDURE — 90686 IIV4 VACC NO PRSV 0.5 ML IM: CPT | Mod: S$GLB,,, | Performed by: FAMILY MEDICINE

## 2017-11-06 RX ORDER — PREGABALIN 100 MG/1
100 CAPSULE ORAL 3 TIMES DAILY
Qty: 90 CAPSULE | Refills: 5 | OUTPATIENT
Start: 2017-11-06

## 2017-11-06 RX ORDER — PREGABALIN 100 MG/1
100 CAPSULE ORAL 3 TIMES DAILY
Qty: 90 CAPSULE | Refills: 5 | Status: SHIPPED | OUTPATIENT
Start: 2017-11-06 | End: 2018-11-08

## 2017-11-06 RX ORDER — LOSARTAN POTASSIUM AND HYDROCHLOROTHIAZIDE 25; 100 MG/1; MG/1
1 TABLET ORAL DAILY
Qty: 90 TABLET | Refills: 3 | OUTPATIENT
Start: 2017-11-06

## 2017-11-06 RX ORDER — LOSARTAN POTASSIUM AND HYDROCHLOROTHIAZIDE 25; 100 MG/1; MG/1
1 TABLET ORAL DAILY
Qty: 90 TABLET | Refills: 3 | Status: SHIPPED | OUTPATIENT
Start: 2017-11-06 | End: 2018-11-08 | Stop reason: SDUPTHER

## 2017-11-07 ENCOUNTER — PATIENT MESSAGE (OUTPATIENT)
Dept: INTERNAL MEDICINE | Facility: CLINIC | Age: 57
End: 2017-11-07

## 2018-02-02 DIAGNOSIS — E11.9 TYPE 2 DIABETES MELLITUS WITHOUT COMPLICATION: ICD-10-CM

## 2018-02-28 RX ORDER — TIZANIDINE 4 MG/1
4 TABLET ORAL EVERY 6 HOURS PRN
Qty: 120 TABLET | Refills: 3 | Status: SHIPPED | OUTPATIENT
Start: 2018-02-28 | End: 2018-07-10 | Stop reason: SDUPTHER

## 2018-04-02 RX ORDER — METFORMIN HYDROCHLORIDE 500 MG/1
TABLET, EXTENDED RELEASE ORAL
Qty: 60 TABLET | Refills: 5 | Status: SHIPPED | OUTPATIENT
Start: 2018-04-02 | End: 2018-08-03 | Stop reason: SDUPTHER

## 2018-07-09 DIAGNOSIS — K21.00 GASTROESOPHAGEAL REFLUX DISEASE WITH ESOPHAGITIS: ICD-10-CM

## 2018-07-09 RX ORDER — ESOMEPRAZOLE MAGNESIUM 40 MG/1
40 CAPSULE, DELAYED RELEASE ORAL
Qty: 30 CAPSULE | Refills: 0 | Status: SHIPPED | OUTPATIENT
Start: 2018-07-09 | End: 2018-11-08

## 2018-07-10 RX ORDER — TIZANIDINE 4 MG/1
4 TABLET ORAL EVERY 6 HOURS PRN
Qty: 120 TABLET | Refills: 3 | Status: SHIPPED | OUTPATIENT
Start: 2018-07-10 | End: 2018-11-08 | Stop reason: SDUPTHER

## 2018-07-12 RX ORDER — SERTRALINE HYDROCHLORIDE 100 MG/1
TABLET, FILM COATED ORAL
Qty: 180 TABLET | Refills: 0 | Status: SHIPPED | OUTPATIENT
Start: 2018-07-12 | End: 2018-10-12 | Stop reason: SDUPTHER

## 2018-07-20 DIAGNOSIS — E11.9 TYPE 2 DIABETES MELLITUS WITHOUT COMPLICATION: ICD-10-CM

## 2018-08-03 RX ORDER — METFORMIN HYDROCHLORIDE 500 MG/1
TABLET, EXTENDED RELEASE ORAL
Qty: 60 TABLET | Refills: 1 | Status: SHIPPED | OUTPATIENT
Start: 2018-08-03 | End: 2018-10-09 | Stop reason: SDUPTHER

## 2018-09-14 ENCOUNTER — OFFICE VISIT (OUTPATIENT)
Dept: URGENT CARE | Facility: CLINIC | Age: 58
End: 2018-09-14
Payer: MEDICARE

## 2018-09-14 VITALS
HEART RATE: 87 BPM | HEIGHT: 62 IN | TEMPERATURE: 100 F | RESPIRATION RATE: 16 BRPM | SYSTOLIC BLOOD PRESSURE: 140 MMHG | BODY MASS INDEX: 36.68 KG/M2 | WEIGHT: 199.31 LBS | OXYGEN SATURATION: 98 % | DIASTOLIC BLOOD PRESSURE: 88 MMHG

## 2018-09-14 DIAGNOSIS — K04.7 DENTAL INFECTION: Primary | ICD-10-CM

## 2018-09-14 PROCEDURE — 99214 OFFICE O/P EST MOD 30 MIN: CPT | Mod: S$PBB,,, | Performed by: NURSE PRACTITIONER

## 2018-09-14 PROCEDURE — 3079F DIAST BP 80-89 MM HG: CPT | Mod: CPTII,,, | Performed by: NURSE PRACTITIONER

## 2018-09-14 PROCEDURE — 99999 PR PBB SHADOW E&M-EST. PATIENT-LVL V: CPT | Mod: PBBFAC,,, | Performed by: NURSE PRACTITIONER

## 2018-09-14 PROCEDURE — 3077F SYST BP >= 140 MM HG: CPT | Mod: CPTII,,, | Performed by: NURSE PRACTITIONER

## 2018-09-14 PROCEDURE — 3008F BODY MASS INDEX DOCD: CPT | Mod: CPTII,,, | Performed by: NURSE PRACTITIONER

## 2018-09-14 PROCEDURE — 99215 OFFICE O/P EST HI 40 MIN: CPT | Mod: PBBFAC,PO | Performed by: NURSE PRACTITIONER

## 2018-09-14 RX ORDER — AMOXICILLIN AND CLAVULANATE POTASSIUM 875; 125 MG/1; MG/1
1 TABLET, FILM COATED ORAL 2 TIMES DAILY
Qty: 20 TABLET | Refills: 0 | Status: SHIPPED | OUTPATIENT
Start: 2018-09-14 | End: 2018-09-24

## 2018-09-14 NOTE — PROGRESS NOTES
"Subjective:       Patient ID: Melita Romero is a 57 y.o. female.    Chief Complaint: Dental Pain    Dental Pain    This is a new problem. The current episode started in the past 7 days. The problem occurs constantly. The problem has been gradually worsening. The pain is mild. Associated symptoms include facial pain and thermal sensitivity. Pertinent negatives include no difficulty swallowing, fever, oral bleeding or sinus pressure. She has tried ice for the symptoms. The treatment provided mild relief.       BP (!) 140/88 (BP Location: Right arm, Patient Position: Sitting, BP Method: Large (Manual))   Pulse 87   Temp 99.8 °F (37.7 °C) (Tympanic)   Resp 16   Ht 5' 2" (1.575 m)   Wt 90.4 kg (199 lb 4.7 oz)   SpO2 98%   BMI 36.45 kg/m²     Review of Systems   Constitutional: Positive for activity change. Negative for appetite change, chills, diaphoresis, fatigue and fever.   HENT: Positive for dental problem. Negative for sinus pressure, sneezing, sore throat, tinnitus, trouble swallowing and voice change.    Eyes: Negative for visual disturbance.   Respiratory: Negative for cough, shortness of breath and wheezing.    Cardiovascular: Negative for chest pain, palpitations and leg swelling.   Gastrointestinal: Negative for abdominal distention, abdominal pain, blood in stool, constipation, diarrhea, nausea and vomiting.   Genitourinary: Negative for decreased urine volume, difficulty urinating, dysuria, frequency, hematuria and urgency.   Neurological: Negative.  Negative for dizziness, syncope, speech difficulty, light-headedness and headaches.   Psychiatric/Behavioral: Negative for agitation, confusion and hallucinations. The patient is not nervous/anxious.        Objective:      Physical Exam   Constitutional: She is oriented to person, place, and time. She appears well-developed and well-nourished. She is cooperative. No distress.   HENT:   Head: Normocephalic and atraumatic.   Mouth/Throat: Uvula is " midline, oropharynx is clear and moist and mucous membranes are normal. Abnormal dentition. Dental caries present.       Poor dentition, area of pain per drawing. No definite abscess formation    Eyes: Conjunctivae are normal. Right eye exhibits no discharge. Left eye exhibits no discharge.   Cardiovascular: Normal rate.   Pulmonary/Chest: Effort normal. No respiratory distress.   Abdominal: Soft. She exhibits no distension.   Musculoskeletal: Normal range of motion.   Lymphadenopathy:        Head (right side): No submental, no submandibular and no tonsillar adenopathy present.        Head (left side): No submental, no submandibular and no tonsillar adenopathy present.     She has no cervical adenopathy.   Neurological: She is alert and oriented to person, place, and time.   Skin: Skin is warm and dry. No rash noted. She is not diaphoretic.   Psychiatric: She has a normal mood and affect. Her behavior is normal. Judgment and thought content normal.   Nursing note and vitals reviewed.      Assessment:       1. Dental infection        Plan:       Melita was seen today for dental pain.    Diagnoses and all orders for this visit:    Dental infection  -     amoxicillin-clavulanate 875-125mg (AUGMENTIN) 875-125 mg per tablet; Take 1 tablet by mouth 2 (two) times daily. for 10 days    cool jagruti  States that she has pain meds at home and does not need anything for pain  Advised her to see dentist ASAP

## 2018-10-09 RX ORDER — METFORMIN HYDROCHLORIDE 500 MG/1
TABLET, EXTENDED RELEASE ORAL
Qty: 60 TABLET | Refills: 1 | Status: SHIPPED | OUTPATIENT
Start: 2018-10-09 | End: 2018-11-08 | Stop reason: SDUPTHER

## 2018-10-12 RX ORDER — SERTRALINE HYDROCHLORIDE 100 MG/1
TABLET, FILM COATED ORAL
Qty: 180 TABLET | Refills: 0 | Status: SHIPPED | OUTPATIENT
Start: 2018-10-12 | End: 2018-11-08 | Stop reason: SDUPTHER

## 2018-11-06 ENCOUNTER — IMMUNIZATION (OUTPATIENT)
Dept: INTERNAL MEDICINE | Facility: CLINIC | Age: 58
End: 2018-11-06
Payer: MEDICARE

## 2018-11-06 ENCOUNTER — TELEPHONE (OUTPATIENT)
Dept: INTERNAL MEDICINE | Facility: CLINIC | Age: 58
End: 2018-11-06

## 2018-11-06 PROCEDURE — 90686 IIV4 VACC NO PRSV 0.5 ML IM: CPT | Mod: HCNC,S$GLB,, | Performed by: INTERNAL MEDICINE

## 2018-11-06 PROCEDURE — G0008 ADMIN INFLUENZA VIRUS VAC: HCPCS | Mod: HCNC,S$GLB,, | Performed by: INTERNAL MEDICINE

## 2018-11-07 RX ORDER — LOSARTAN POTASSIUM AND HYDROCHLOROTHIAZIDE 25; 100 MG/1; MG/1
1 TABLET ORAL DAILY
Qty: 90 TABLET | Refills: 3 | Status: SHIPPED | OUTPATIENT
Start: 2018-11-07 | End: 2018-11-08

## 2018-11-08 ENCOUNTER — LAB VISIT (OUTPATIENT)
Dept: LAB | Facility: HOSPITAL | Age: 58
End: 2018-11-08
Attending: FAMILY MEDICINE
Payer: MEDICARE

## 2018-11-08 ENCOUNTER — OFFICE VISIT (OUTPATIENT)
Dept: INTERNAL MEDICINE | Facility: CLINIC | Age: 58
End: 2018-11-08
Payer: MEDICARE

## 2018-11-08 VITALS
BODY MASS INDEX: 33.73 KG/M2 | SYSTOLIC BLOOD PRESSURE: 128 MMHG | HEART RATE: 102 BPM | HEIGHT: 64 IN | DIASTOLIC BLOOD PRESSURE: 80 MMHG | WEIGHT: 197.56 LBS | TEMPERATURE: 98 F

## 2018-11-08 DIAGNOSIS — E55.9 VITAMIN D DEFICIENCY DISEASE: ICD-10-CM

## 2018-11-08 DIAGNOSIS — E11.9 TYPE 2 DIABETES MELLITUS WITHOUT COMPLICATION, WITHOUT LONG-TERM CURRENT USE OF INSULIN: ICD-10-CM

## 2018-11-08 DIAGNOSIS — Z00.00 ROUTINE GENERAL MEDICAL EXAMINATION AT A HEALTH CARE FACILITY: ICD-10-CM

## 2018-11-08 DIAGNOSIS — Z12.31 ENCOUNTER FOR SCREENING MAMMOGRAM FOR BREAST CANCER: ICD-10-CM

## 2018-11-08 DIAGNOSIS — E66.01 SEVERE OBESITY (BMI 35.0-39.9) WITH COMORBIDITY: ICD-10-CM

## 2018-11-08 DIAGNOSIS — E11.9 TYPE 2 DIABETES MELLITUS WITHOUT COMPLICATION: ICD-10-CM

## 2018-11-08 DIAGNOSIS — M85.80 OSTEOPENIA, UNSPECIFIED LOCATION: ICD-10-CM

## 2018-11-08 DIAGNOSIS — I10 ESSENTIAL HYPERTENSION: ICD-10-CM

## 2018-11-08 DIAGNOSIS — Z00.00 ROUTINE GENERAL MEDICAL EXAMINATION AT A HEALTH CARE FACILITY: Primary | ICD-10-CM

## 2018-11-08 DIAGNOSIS — K21.00 GASTROESOPHAGEAL REFLUX DISEASE WITH ESOPHAGITIS: ICD-10-CM

## 2018-11-08 DIAGNOSIS — Z12.11 COLON CANCER SCREENING: ICD-10-CM

## 2018-11-08 LAB
25(OH)D3+25(OH)D2 SERPL-MCNC: 48 NG/ML
ALBUMIN SERPL BCP-MCNC: 4.1 G/DL
ALBUMIN/CREAT UR: 7.8 UG/MG
ALP SERPL-CCNC: 82 U/L
ALT SERPL W/O P-5'-P-CCNC: 24 U/L
ANION GAP SERPL CALC-SCNC: 11 MMOL/L
AST SERPL-CCNC: 30 U/L
BASOPHILS # BLD AUTO: 0.07 K/UL
BASOPHILS NFR BLD: 0.9 %
BILIRUB SERPL-MCNC: 0.3 MG/DL
BUN SERPL-MCNC: 15 MG/DL
CALCIUM SERPL-MCNC: 10.2 MG/DL
CHLORIDE SERPL-SCNC: 102 MMOL/L
CHOLEST SERPL-MCNC: 274 MG/DL
CHOLEST SERPL-MCNC: 274 MG/DL
CHOLEST/HDLC SERPL: 6 {RATIO}
CHOLEST/HDLC SERPL: 6 {RATIO}
CO2 SERPL-SCNC: 29 MMOL/L
CREAT SERPL-MCNC: 0.8 MG/DL
CREAT UR-MCNC: 180 MG/DL
DIFFERENTIAL METHOD: ABNORMAL
EOSINOPHIL # BLD AUTO: 0.1 K/UL
EOSINOPHIL NFR BLD: 1.8 %
ERYTHROCYTE [DISTWIDTH] IN BLOOD BY AUTOMATED COUNT: 12.7 %
EST. GFR  (AFRICAN AMERICAN): >60 ML/MIN/1.73 M^2
EST. GFR  (NON AFRICAN AMERICAN): >60 ML/MIN/1.73 M^2
ESTIMATED AVG GLUCOSE: 111 MG/DL
ESTIMATED AVG GLUCOSE: 111 MG/DL
GLUCOSE SERPL-MCNC: 99 MG/DL
HBA1C MFR BLD HPLC: 5.5 %
HBA1C MFR BLD HPLC: 5.5 %
HCT VFR BLD AUTO: 43.9 %
HDLC SERPL-MCNC: 46 MG/DL
HDLC SERPL-MCNC: 46 MG/DL
HDLC SERPL: 16.8 %
HDLC SERPL: 16.8 %
HGB BLD-MCNC: 14.5 G/DL
IMM GRANULOCYTES # BLD AUTO: 0.01 K/UL
IMM GRANULOCYTES NFR BLD AUTO: 0.1 %
INSULIN COLLECTION INTERVAL: NORMAL
INSULIN SERPL-ACNC: 11.1 UU/ML
LDLC SERPL CALC-MCNC: 171.8 MG/DL
LDLC SERPL CALC-MCNC: 171.8 MG/DL
LYMPHOCYTES # BLD AUTO: 1.5 K/UL
LYMPHOCYTES NFR BLD: 19.1 %
MCH RBC QN AUTO: 31.5 PG
MCHC RBC AUTO-ENTMCNC: 33 G/DL
MCV RBC AUTO: 95 FL
MICROALBUMIN UR DL<=1MG/L-MCNC: 14 UG/ML
MONOCYTES # BLD AUTO: 0.5 K/UL
MONOCYTES NFR BLD: 6.8 %
NEUTROPHILS # BLD AUTO: 5.6 K/UL
NEUTROPHILS NFR BLD: 71.3 %
NONHDLC SERPL-MCNC: 228 MG/DL
NONHDLC SERPL-MCNC: 228 MG/DL
NRBC BLD-RTO: 0 /100 WBC
PLATELET # BLD AUTO: 319 K/UL
PMV BLD AUTO: 9.8 FL
POTASSIUM SERPL-SCNC: 4.3 MMOL/L
PROT SERPL-MCNC: 7.4 G/DL
RBC # BLD AUTO: 4.61 M/UL
SODIUM SERPL-SCNC: 142 MMOL/L
T4 FREE SERPL-MCNC: 0.79 NG/DL
TRIGL SERPL-MCNC: 281 MG/DL
TRIGL SERPL-MCNC: 281 MG/DL
TSH SERPL DL<=0.005 MIU/L-ACNC: 1.46 UIU/ML
WBC # BLD AUTO: 7.91 K/UL

## 2018-11-08 PROCEDURE — 85025 COMPLETE CBC W/AUTO DIFF WBC: CPT | Mod: HCNC

## 2018-11-08 PROCEDURE — 36415 COLL VENOUS BLD VENIPUNCTURE: CPT | Mod: HCNC,PO

## 2018-11-08 PROCEDURE — 84443 ASSAY THYROID STIM HORMONE: CPT | Mod: HCNC

## 2018-11-08 PROCEDURE — 99396 PREV VISIT EST AGE 40-64: CPT | Mod: HCNC,S$GLB,, | Performed by: FAMILY MEDICINE

## 2018-11-08 PROCEDURE — 80061 LIPID PANEL: CPT | Mod: HCNC

## 2018-11-08 PROCEDURE — 84439 ASSAY OF FREE THYROXINE: CPT | Mod: HCNC

## 2018-11-08 PROCEDURE — 82306 VITAMIN D 25 HYDROXY: CPT | Mod: HCNC

## 2018-11-08 PROCEDURE — 80053 COMPREHEN METABOLIC PANEL: CPT | Mod: HCNC

## 2018-11-08 PROCEDURE — 3079F DIAST BP 80-89 MM HG: CPT | Mod: CPTII,HCNC,S$GLB, | Performed by: FAMILY MEDICINE

## 2018-11-08 PROCEDURE — 83036 HEMOGLOBIN GLYCOSYLATED A1C: CPT | Mod: HCNC

## 2018-11-08 PROCEDURE — 99999 PR PBB SHADOW E&M-EST. PATIENT-LVL III: CPT | Mod: PBBFAC,HCNC,, | Performed by: FAMILY MEDICINE

## 2018-11-08 PROCEDURE — 3074F SYST BP LT 130 MM HG: CPT | Mod: CPTII,HCNC,S$GLB, | Performed by: FAMILY MEDICINE

## 2018-11-08 PROCEDURE — 82043 UR ALBUMIN QUANTITATIVE: CPT | Mod: HCNC

## 2018-11-08 PROCEDURE — 83525 ASSAY OF INSULIN: CPT | Mod: HCNC

## 2018-11-08 PROCEDURE — 99499 UNLISTED E&M SERVICE: CPT | Mod: S$GLB,,, | Performed by: FAMILY MEDICINE

## 2018-11-08 RX ORDER — ATORVASTATIN CALCIUM 40 MG/1
40 TABLET, FILM COATED ORAL DAILY
Qty: 90 TABLET | Refills: 3 | Status: SHIPPED | OUTPATIENT
Start: 2018-11-08 | End: 2019-11-09 | Stop reason: SDUPTHER

## 2018-11-08 RX ORDER — SERTRALINE HYDROCHLORIDE 100 MG/1
200 TABLET, FILM COATED ORAL DAILY
Qty: 180 TABLET | Refills: 3 | Status: SHIPPED | OUTPATIENT
Start: 2018-11-08 | End: 2019-11-09 | Stop reason: SDUPTHER

## 2018-11-08 RX ORDER — PREGABALIN 100 MG/1
100 CAPSULE ORAL DAILY
Qty: 90 CAPSULE | Refills: 1 | Status: ON HOLD | OUTPATIENT
Start: 2018-11-08 | End: 2020-01-09 | Stop reason: HOSPADM

## 2018-11-08 RX ORDER — LOSARTAN POTASSIUM AND HYDROCHLOROTHIAZIDE 25; 100 MG/1; MG/1
1 TABLET ORAL DAILY
Qty: 90 TABLET | Refills: 3 | Status: SHIPPED | OUTPATIENT
Start: 2018-11-08 | End: 2020-01-27 | Stop reason: SDUPTHER

## 2018-11-08 RX ORDER — TIZANIDINE 4 MG/1
4 TABLET ORAL EVERY 6 HOURS PRN
Qty: 360 TABLET | Refills: 3 | Status: SHIPPED | OUTPATIENT
Start: 2018-11-08 | End: 2020-01-27

## 2018-11-08 RX ORDER — METFORMIN HYDROCHLORIDE 500 MG/1
500 TABLET, EXTENDED RELEASE ORAL 2 TIMES DAILY WITH MEALS
Qty: 180 TABLET | Refills: 3 | Status: SHIPPED | OUTPATIENT
Start: 2018-11-08 | End: 2020-01-27

## 2018-11-08 NOTE — PROGRESS NOTES
Subjective:      Patient ID: Melita Romero is a 57 y.o. female.    Chief Complaint: Annual Exam    Disclaimer:  This note is prepared using voice recognition software and as such is likely to have errors and has not been proof read. Please contact me for questions.     Pt is here for  Prevention exam.     Now seeing Dr. Bhatti for chronic pain management.  Currently on hydrocodone 10/325 and Lyrica.  Gets Lyrica from me.  Final on that 3 pills a day was not really much difference than 1 pill a day.    ON meds for cholesterol but been out for last few months. Needing labs.     Has been weaning off the lyrica. Is still on 1 pill a day and feeling more tightness in neck.     Blood pressure. Needing labs. Stable, recently lost  due to brain cancer. Mood is ok with zoloft.      Needing mammogram. gerd symptoms stable, has been using Nexium but willing to transition back to ranitidine.. Needing eye exam for diabetes.  Needing colonoscopy.  Needing bone density.        Lab Results   Component Value Date    WBC 7.07 07/18/2017    HGB 14.0 07/18/2017    HCT 42.9 07/18/2017     07/18/2017    CHOL 168 07/18/2017    TRIG 189 (H) 07/18/2017    HDL 44 07/18/2017    ALT 19 07/18/2017    AST 21 07/18/2017     07/18/2017    K 3.9 07/18/2017     07/18/2017    CREATININE 0.9 07/18/2017    BUN 19 07/18/2017    CO2 29 07/18/2017    TSH 2.994 07/18/2017    HGBA1C 5.4 07/18/2017       Mammo Digital Screening Bilat with CAD  Result:  Mammo Digital Screening Bilat with CAD    History:  Patient is 56 y.o. and is seen for encounter for screening mammogram for   breast cancer.    Films Compared:  Compared to: 07/28/2016 Mammo Digital Screening Bilat with CAD, and   07/21/2015 Mammo Digital Screening Bilat with CAD    Findings:  Computer-aided detection was utilized in the interpretation of this   examination.       The breasts have scattered areas of fibroglandular density. There is no   evidence of suspicious  "masses, microcalcifications or architectural   distortion.    Impression:  No mammographic evidence of malignancy.    BI-RADS Category 1: Negative    Recommendation:  Routine Screening Mammogram in 1 year is recommended for both breasts.    The patient's estimated lifetime risk of breast cancer (to age 85) based   on Tyrer-Cuzick - 7 risk assessment model is: Tyrer-Cuzick: 8.45 %.   According to the American Cancer Society,  patients with a lifetime breast   cancer risk of 20% or higher might benefit from supplemental screening   tests.        Review of Systems   Constitutional: Negative for activity change, appetite change, chills, fatigue and fever.   HENT: Negative for ear pain and trouble swallowing.    Eyes: Negative for pain and visual disturbance.   Respiratory: Negative for cough and shortness of breath.    Cardiovascular: Negative for chest pain and leg swelling.   Gastrointestinal: Negative for abdominal pain, blood in stool, nausea and vomiting.   Endocrine: Negative for cold intolerance and heat intolerance.   Genitourinary: Negative for dysuria and frequency.   Musculoskeletal: Negative for joint swelling, myalgias and neck pain.   Skin: Negative for color change and rash.   Neurological: Negative for dizziness and headaches.   Psychiatric/Behavioral: Negative for behavioral problems and sleep disturbance.     Objective:     Vitals:    11/08/18 0851   BP: 128/80   Pulse: 102   Temp: 98 °F (36.7 °C)   Weight: 89.6 kg (197 lb 8.5 oz)   Height: 5' 4" (1.626 m)     Physical Exam   Constitutional: She is oriented to person, place, and time. She appears well-developed and well-nourished.   HENT:   Head: Normocephalic and atraumatic.   Right Ear: External ear normal.   Left Ear: External ear normal.   Mouth/Throat: Oropharynx is clear and moist.   Eyes: EOM are normal.   Neck: Normal range of motion. Neck supple. No thyromegaly present.   Cardiovascular: Normal rate and regular rhythm. Exam reveals no gallop " and no friction rub.   No murmur heard.  Pulses:       Dorsalis pedis pulses are 2+ on the right side, and 2+ on the left side.   Pulmonary/Chest: Effort normal. No respiratory distress. She has no wheezes. She has no rales.   Abdominal: Soft. Bowel sounds are normal. She exhibits no distension. There is no tenderness. There is no rebound.   Musculoskeletal: Normal range of motion. She exhibits no edema.        Right foot: There is normal range of motion and no deformity.        Left foot: There is normal range of motion and no deformity.   Feet:   Right Foot:   Protective Sensation: 4 sites tested. 4 sites sensed.   Skin Integrity: Positive for warmth. Negative for ulcer, blister, skin breakdown, erythema, callus or dry skin.   Left Foot:   Protective Sensation: 4 sites tested. 4 sites sensed.   Skin Integrity: Positive for warmth. Negative for ulcer, blister, skin breakdown, erythema, callus or dry skin.   Lymphadenopathy:     She has no cervical adenopathy.   Neurological: She is alert and oriented to person, place, and time.   Skin: Skin is warm and dry. No rash noted.   Psychiatric: She has a normal mood and affect. Her behavior is normal. Judgment and thought content normal.   Vitals reviewed.    Assessment:     1. Routine general medical examination at a health care facility    2. Encounter for screening mammogram for breast cancer    3. Type 2 diabetes mellitus without complication, without long-term current use of insulin    4. Vitamin D deficiency disease    5. Essential hypertension    6. Severe obesity (BMI 35.0-39.9) with comorbidity    7. Osteopenia, unspecified location    8. Gastroesophageal reflux disease with esophagitis    9. Colon cancer screening      Plan:   Melita was seen today for annual exam.    Diagnoses and all orders for this visit:    Routine general medical examination at a health care facility-labs ordered today has been out of her cholesterol medicine.  Willing to restart.  Will  send back in.  Set up mammogram bone density an eye exam.  Foot exam performed today  -     TSH; Future  -     T4, free; Future  -     Lipid panel; Future  -     Microalbumin/creatinine urine ratio  -     Hemoglobin A1c; Future  -     Insulin, random; Future  -     Comprehensive metabolic panel; Future  -     CBC auto differential; Future  -     Mammo Digital Screening Bilat with CAD; Future  -     Vitamin D; Future    Encounter for screening mammogram for breast cancer  -     TSH; Future  -     T4, free; Future  -     Lipid panel; Future  -     Microalbumin/creatinine urine ratio  -     Hemoglobin A1c; Future  -     Insulin, random; Future  -     Comprehensive metabolic panel; Future  -     CBC auto differential; Future  -     Mammo Digital Screening Bilat with CAD; Future  -     Vitamin D; Future    Type 2 diabetes mellitus without complication, without long-term current use of insulin-on metformin repeating lab work today  -     TSH; Future  -     T4, free; Future  -     Lipid panel; Future  -     Microalbumin/creatinine urine ratio  -     Hemoglobin A1c; Future  -     Insulin, random; Future  -     Comprehensive metabolic panel; Future  -     CBC auto differential; Future  -     Mammo Digital Screening Bilat with CAD; Future  -     Vitamin D; Future  -     Ambulatory referral to Optometry    Vitamin D deficiency disease-labs today  -     TSH; Future  -     T4, free; Future  -     Lipid panel; Future  -     Microalbumin/creatinine urine ratio  -     Hemoglobin A1c; Future  -     Insulin, random; Future  -     Comprehensive metabolic panel; Future  -     CBC auto differential; Future  -     Mammo Digital Screening Bilat with CAD; Future  -     Vitamin D; Future    Essential hypertension-stable with medication lab work today  -     TSH; Future  -     T4, free; Future  -     Lipid panel; Future  -     Microalbumin/creatinine urine ratio  -     Hemoglobin A1c; Future  -     Insulin, random; Future  -      Comprehensive metabolic panel; Future  -     CBC auto differential; Future  -     Mammo Digital Screening Bilat with CAD; Future  -     Vitamin D; Future    Severe obesity (BMI 35.0-39.9) with comorbidity-discussed diet and exercise  -     TSH; Future  -     T4, free; Future  -     Lipid panel; Future  -     Microalbumin/creatinine urine ratio  -     Hemoglobin A1c; Future  -     Insulin, random; Future  -     Comprehensive metabolic panel; Future  -     CBC auto differential; Future  -     Mammo Digital Screening Bilat with CAD; Future  -     Vitamin D; Future    Osteopenia, unspecified location-will obtain bone density  -     DXA Bone Density Spine And Hip; Future    Gastroesophageal reflux disease with esophagitis-changed from Nexium to ranitidine    Colon cancer screening  -     Case request GI: COLONOSCOPY    Other orders  -     pregabalin (LYRICA) 100 MG capsule; Take 1 capsule (100 mg total) by mouth once daily.  -     atorvastatin (LIPITOR) 40 MG tablet; Take 1 tablet (40 mg total) by mouth once daily.  -     ranitidine (ZANTAC) 150 MG tablet; Take 2 tablets (300 mg total) by mouth nightly.  -     metFORMIN (GLUCOPHAGE-XR) 500 MG 24 hr tablet; Take 1 tablet (500 mg total) by mouth 2 (two) times daily with meals.  -     sertraline (ZOLOFT) 100 MG tablet; Take 2 tablets (200 mg total) by mouth once daily.  -     tiZANidine (ZANAFLEX) 4 MG tablet; Take 1 tablet (4 mg total) by mouth every 6 (six) hours as needed.  -     losartan-hydrochlorothiazide 100-25 mg (HYZAAR) 100-25 mg per tablet; Take 1 tablet by mouth once daily.            Follow-up in about 1 year (around 11/8/2019) for physical with Dr MILNER.    There are no Patient Instructions on file for this visit.

## 2018-12-24 ENCOUNTER — TELEPHONE (OUTPATIENT)
Dept: ENDOSCOPY | Facility: HOSPITAL | Age: 58
End: 2018-12-24

## 2019-03-06 ENCOUNTER — PES CALL (OUTPATIENT)
Dept: ADMINISTRATIVE | Facility: CLINIC | Age: 59
End: 2019-03-06

## 2019-05-18 ENCOUNTER — OFFICE VISIT (OUTPATIENT)
Dept: URGENT CARE | Facility: CLINIC | Age: 59
End: 2019-05-18
Payer: MEDICARE

## 2019-05-18 VITALS
WEIGHT: 191.56 LBS | OXYGEN SATURATION: 95 % | BODY MASS INDEX: 32.7 KG/M2 | HEART RATE: 81 BPM | HEIGHT: 64 IN | TEMPERATURE: 98 F | SYSTOLIC BLOOD PRESSURE: 128 MMHG | DIASTOLIC BLOOD PRESSURE: 82 MMHG

## 2019-05-18 DIAGNOSIS — N30.00 ACUTE CYSTITIS WITHOUT HEMATURIA: Primary | ICD-10-CM

## 2019-05-18 LAB
BILIRUB SERPL-MCNC: NEGATIVE MG/DL
BLOOD URINE, POC: NEGATIVE
COLOR, POC UA: YELLOW
GLUCOSE UR QL STRIP: NORMAL
KETONES UR QL STRIP: NEGATIVE
LEUKOCYTE ESTERASE URINE, POC: NEGATIVE
NITRITE, POC UA: NEGATIVE
PH, POC UA: 7
PROTEIN, POC: NEGATIVE
SPECIFIC GRAVITY, POC UA: 1.01
UROBILINOGEN, POC UA: NORMAL

## 2019-05-18 PROCEDURE — 81002 POCT URINE DIPSTICK WITHOUT MICROSCOPE: ICD-10-PCS | Mod: HCNC,S$GLB,, | Performed by: FAMILY MEDICINE

## 2019-05-18 PROCEDURE — 3079F PR MOST RECENT DIASTOLIC BLOOD PRESSURE 80-89 MM HG: ICD-10-PCS | Mod: HCNC,CPTII,S$GLB, | Performed by: FAMILY MEDICINE

## 2019-05-18 PROCEDURE — 3074F SYST BP LT 130 MM HG: CPT | Mod: HCNC,CPTII,S$GLB, | Performed by: FAMILY MEDICINE

## 2019-05-18 PROCEDURE — 99999 PR PBB SHADOW E&M-EST. PATIENT-LVL V: ICD-10-PCS | Mod: PBBFAC,HCNC,,

## 2019-05-18 PROCEDURE — 3008F PR BODY MASS INDEX (BMI) DOCUMENTED: ICD-10-PCS | Mod: HCNC,CPTII,S$GLB, | Performed by: FAMILY MEDICINE

## 2019-05-18 PROCEDURE — 3074F PR MOST RECENT SYSTOLIC BLOOD PRESSURE < 130 MM HG: ICD-10-PCS | Mod: HCNC,CPTII,S$GLB, | Performed by: FAMILY MEDICINE

## 2019-05-18 PROCEDURE — 81002 URINALYSIS NONAUTO W/O SCOPE: CPT | Mod: HCNC,S$GLB,, | Performed by: FAMILY MEDICINE

## 2019-05-18 PROCEDURE — 3079F DIAST BP 80-89 MM HG: CPT | Mod: HCNC,CPTII,S$GLB, | Performed by: FAMILY MEDICINE

## 2019-05-18 PROCEDURE — 3008F BODY MASS INDEX DOCD: CPT | Mod: HCNC,CPTII,S$GLB, | Performed by: FAMILY MEDICINE

## 2019-05-18 PROCEDURE — 99214 OFFICE O/P EST MOD 30 MIN: CPT | Mod: HCNC,25,S$GLB, | Performed by: FAMILY MEDICINE

## 2019-05-18 PROCEDURE — 99999 PR PBB SHADOW E&M-EST. PATIENT-LVL V: CPT | Mod: PBBFAC,HCNC,,

## 2019-05-18 PROCEDURE — 99214 PR OFFICE/OUTPT VISIT, EST, LEVL IV, 30-39 MIN: ICD-10-PCS | Mod: HCNC,25,S$GLB, | Performed by: FAMILY MEDICINE

## 2019-05-18 RX ORDER — NITROFURANTOIN 25; 75 MG/1; MG/1
100 CAPSULE ORAL 2 TIMES DAILY
Qty: 14 CAPSULE | Refills: 0 | Status: SHIPPED | OUTPATIENT
Start: 2019-05-18 | End: 2019-05-25

## 2019-05-18 NOTE — PROGRESS NOTES
Subjective:       Patient ID: Melita Romero is a 58 y.o. female.    Chief Complaint: No chief complaint on file.    Patient is presenting with c/o dysuria for 2 days. No fever, flank pain or hematuria. Denies h/o recurrent UTIs. Urine in clinic indicated Leucocytes. Has a h/o renal stones.    Review of Systems   Constitutional: Positive for activity change.   Cardiovascular: Negative.    Gastrointestinal: Negative.    Genitourinary: Positive for dysuria, frequency and urgency.   Neurological: Negative.    Psychiatric/Behavioral: Negative.        Objective:      There were no vitals taken for this visit.  Physical Exam   Constitutional: She appears well-developed and well-nourished. No distress.   Cardiovascular: Normal rate, regular rhythm, normal heart sounds and intact distal pulses.   Pulmonary/Chest: Effort normal and breath sounds normal. No stridor. No respiratory distress. She has no wheezes.   Abdominal: Soft. Bowel sounds are normal. She exhibits no distension. There is no tenderness. There is no guarding.   Musculoskeletal: Normal range of motion.   Neurological: She is alert.   Skin: Capillary refill takes less than 2 seconds. She is not diaphoretic.   Psychiatric: She has a normal mood and affect.   Nursing note and vitals reviewed.      Assessment:       1. Acute cystitis without hematuria        Plan:       Acute cystitis without hematuria  -     Urine culture  -     POCT URINE DIPSTICK WITHOUT MICROSCOPE    Macrobid rx

## 2019-11-06 ENCOUNTER — OFFICE VISIT (OUTPATIENT)
Dept: INTERNAL MEDICINE | Facility: CLINIC | Age: 59
End: 2019-11-06
Payer: MEDICARE

## 2019-11-06 ENCOUNTER — IMMUNIZATION (OUTPATIENT)
Dept: PHARMACY | Facility: CLINIC | Age: 59
End: 2019-11-06
Payer: MEDICARE

## 2019-11-06 VITALS
TEMPERATURE: 99 F | DIASTOLIC BLOOD PRESSURE: 82 MMHG | SYSTOLIC BLOOD PRESSURE: 134 MMHG | HEIGHT: 64 IN | WEIGHT: 192.25 LBS | BODY MASS INDEX: 32.82 KG/M2

## 2019-11-06 DIAGNOSIS — M47.816 LUMBAR SPONDYLOSIS: ICD-10-CM

## 2019-11-06 DIAGNOSIS — M51.36 DDD (DEGENERATIVE DISC DISEASE), LUMBAR: ICD-10-CM

## 2019-11-06 DIAGNOSIS — E78.2 MIXED HYPERLIPIDEMIA: ICD-10-CM

## 2019-11-06 DIAGNOSIS — Z86.010 HISTORY OF COLON POLYPS: ICD-10-CM

## 2019-11-06 DIAGNOSIS — I51.89 LEFT VENTRICULAR DIASTOLIC DYSFUNCTION WITH PRESERVED SYSTOLIC FUNCTION: ICD-10-CM

## 2019-11-06 DIAGNOSIS — E55.9 VITAMIN D DEFICIENCY DISEASE: ICD-10-CM

## 2019-11-06 DIAGNOSIS — K21.9 GASTROESOPHAGEAL REFLUX DISEASE, ESOPHAGITIS PRESENCE NOT SPECIFIED: ICD-10-CM

## 2019-11-06 DIAGNOSIS — F33.1 MODERATE EPISODE OF RECURRENT MAJOR DEPRESSIVE DISORDER: ICD-10-CM

## 2019-11-06 DIAGNOSIS — F41.1 GAD (GENERALIZED ANXIETY DISORDER): ICD-10-CM

## 2019-11-06 DIAGNOSIS — E11.9 CONTROLLED TYPE 2 DIABETES MELLITUS WITHOUT COMPLICATION, WITHOUT LONG-TERM CURRENT USE OF INSULIN: ICD-10-CM

## 2019-11-06 DIAGNOSIS — G47.00 INSOMNIA, UNSPECIFIED TYPE: ICD-10-CM

## 2019-11-06 DIAGNOSIS — I10 ESSENTIAL HYPERTENSION: ICD-10-CM

## 2019-11-06 DIAGNOSIS — M85.80 OSTEOPENIA, UNSPECIFIED LOCATION: ICD-10-CM

## 2019-11-06 DIAGNOSIS — F17.200 SMOKER: ICD-10-CM

## 2019-11-06 DIAGNOSIS — E04.2 MULTINODULAR THYROID: ICD-10-CM

## 2019-11-06 DIAGNOSIS — K57.90 DIVERTICULOSIS: ICD-10-CM

## 2019-11-06 DIAGNOSIS — Z12.11 SCREEN FOR COLON CANCER: ICD-10-CM

## 2019-11-06 DIAGNOSIS — Z00.00 ENCOUNTER FOR PREVENTIVE HEALTH EXAMINATION: Primary | ICD-10-CM

## 2019-11-06 PROBLEM — E66.01 SEVERE OBESITY (BMI 35.0-39.9) WITH COMORBIDITY: Status: RESOLVED | Noted: 2017-02-27 | Resolved: 2019-11-06

## 2019-11-06 PROBLEM — Z86.0100 HISTORY OF COLON POLYPS: Status: ACTIVE | Noted: 2019-11-06

## 2019-11-06 PROCEDURE — 99999 PR PBB SHADOW E&M-EST. PATIENT-LVL III: CPT | Mod: PBBFAC,HCNC,, | Performed by: PHYSICIAN ASSISTANT

## 2019-11-06 PROCEDURE — 3079F PR MOST RECENT DIASTOLIC BLOOD PRESSURE 80-89 MM HG: ICD-10-PCS | Mod: HCNC,CPTII,S$GLB, | Performed by: PHYSICIAN ASSISTANT

## 2019-11-06 PROCEDURE — 99499 UNLISTED E&M SERVICE: CPT | Mod: HCNC,S$GLB,, | Performed by: PHYSICIAN ASSISTANT

## 2019-11-06 PROCEDURE — 3079F DIAST BP 80-89 MM HG: CPT | Mod: HCNC,CPTII,S$GLB, | Performed by: PHYSICIAN ASSISTANT

## 2019-11-06 PROCEDURE — 3044F PR MOST RECENT HEMOGLOBIN A1C LEVEL <7.0%: ICD-10-PCS | Mod: HCNC,CPTII,S$GLB, | Performed by: PHYSICIAN ASSISTANT

## 2019-11-06 PROCEDURE — G0439 PR MEDICARE ANNUAL WELLNESS SUBSEQUENT VISIT: ICD-10-PCS | Mod: HCNC,S$GLB,, | Performed by: PHYSICIAN ASSISTANT

## 2019-11-06 PROCEDURE — 3075F PR MOST RECENT SYSTOLIC BLOOD PRESS GE 130-139MM HG: ICD-10-PCS | Mod: HCNC,CPTII,S$GLB, | Performed by: PHYSICIAN ASSISTANT

## 2019-11-06 PROCEDURE — 3044F HG A1C LEVEL LT 7.0%: CPT | Mod: HCNC,CPTII,S$GLB, | Performed by: PHYSICIAN ASSISTANT

## 2019-11-06 PROCEDURE — G0439 PPPS, SUBSEQ VISIT: HCPCS | Mod: HCNC,S$GLB,, | Performed by: PHYSICIAN ASSISTANT

## 2019-11-06 PROCEDURE — 99499 RISK ADDL DX/OHS AUDIT: ICD-10-PCS | Mod: HCNC,S$GLB,, | Performed by: PHYSICIAN ASSISTANT

## 2019-11-06 PROCEDURE — 3075F SYST BP GE 130 - 139MM HG: CPT | Mod: HCNC,CPTII,S$GLB, | Performed by: PHYSICIAN ASSISTANT

## 2019-11-06 PROCEDURE — 99999 PR PBB SHADOW E&M-EST. PATIENT-LVL III: ICD-10-PCS | Mod: PBBFAC,HCNC,, | Performed by: PHYSICIAN ASSISTANT

## 2019-11-06 NOTE — PATIENT INSTRUCTIONS
Counseling and Referral of Other Preventative  (Italic type indicates deductible and co-insurance are waived)    Patient Name: Melita Romero  Today's Date: 11/6/2019    Health Maintenance       Date Due Completion Date    Shingles Vaccine (1 of 2) 12/07/2012 ---    Mammogram 09/27/2018 9/27/2017    Override on 8/15/2012: Done    Override on 6/15/2011: Done    Override on 9/14/2009: Done    Eye Exam 09/27/2018 9/27/2017    Override on 9/27/2017: Done    Override on 4/20/2015: Not Clinically Appropriate (NOT DIABETIC)    Override on 5/10/2011: Done    DEXA SCAN 10/29/2018 10/29/2013    Override on 9/14/2009: Done (REPEAT 3 YRS)    Colonoscopy 12/02/2018 12/2/2013    Hemoglobin A1c 05/08/2019 11/8/2018    Influenza Vaccine (1) 09/01/2019 11/6/2018    Override on 10/10/2012: Done    Foot Exam 11/08/2019 11/8/2018    Override on 7/25/2017: Done    Lipid Panel 11/08/2019 11/8/2018    TETANUS VACCINE 07/10/2022 7/10/2012        No orders of the defined types were placed in this encounter.    The following information is provided to all patients.  This information is to help you find resources for any of the problems found today that may be affecting your health:                Living healthy guide: www.[facility].[place].gov      Understanding Diabetes: www.diabetes.org      Eating healthy: www.cdc.gov/healthyweight      CDC home safety checklist: www.cdc.gov/steadi/patient.html      Agency on Aging: www.goea.[place].[facility]      Alcoholics anonymous (AA): www.aa.org      Physical Activity: www.maya.nih.gov/mf9kgut      Tobacco use: www.quitwithusla.org

## 2019-11-06 NOTE — PROGRESS NOTES
"Melita Romero presented for a  Medicare AWV and comprehensive Health Risk Assessment today. The following components were reviewed and updated:    · Medical history  · Family History  · Social history  · Allergies and Current Medications  · Health Risk Assessment  · Health Maintenance  · Care Team     ** See Completed Assessments for Annual Wellness Visit within the encounter summary.**       The following assessments were completed:  · Living Situation  · CAGE  · Depression Screening  · Timed Get Up and Go  · Whisper Test  · Cognitive Function Screening  · Nutrition Screening  · ADL Screening  · PAQ Screening    Patient Care Team:  Janett Mccauley MD as PCP - General (Family Medicine)  Yani Geiger LPN as Care Coordinator (Internal Medicine)  Eriberto Bhatti MD as Consulting Physician (Pain Medicine)    Vitals:    11/06/19 1120   BP: 134/82   BP Location: Right arm   Temp: 98.5 °F (36.9 °C)   TempSrc: Tympanic   Weight: 87.2 kg (192 lb 3.9 oz)   Height: 5' 4" (1.626 m)     Body mass index is 33 kg/m².     Physical Exam   Constitutional: She is oriented to person, place, and time. She appears well-developed and well-nourished. No distress.   HENT:   Head: Normocephalic and atraumatic.   Eyes: EOM are normal. No scleral icterus.   Neck: Neck supple.   Cardiovascular: Normal rate and regular rhythm.   Pulmonary/Chest: Effort normal and breath sounds normal. No respiratory distress. She has no decreased breath sounds. She has no wheezes. She has no rhonchi. She has no rales.   Musculoskeletal: Normal range of motion.   Neurological: She is alert and oriented to person, place, and time.   Skin: Skin is warm and dry.   Psychiatric: She has a normal mood and affect. Her speech is normal and behavior is normal. Thought content normal.         Diagnoses and health risks identified today and associated recommendations/orders:    1. Encounter for preventive health examination  Pt to update eye exam. Screening " mammogram has been ordered by PCP - will have schedulers assist pt in scheduling. Pt to consider Shingrix and influenza vaccines at pharmacy as discussed. Screening colonoscopy due - ordered for pt today. Pt is due to see PCP for annual exam - will have schedulers assist pt in scheduling appt with PCP for office visit.    2. Controlled type 2 diabetes mellitus without complication, without long-term current use of insulin  Controlled on most recent labs. Pt taking metformin. Continue current treatment plan as prescribed by your PCP and f/u with your PCP for further management.  Component      Latest Ref Rng & Units 11/8/2018   Hemoglobin A1C External      4.0 - 5.6 % 5.5   Estimated Avg Glucose      68 - 131 mg/dL 111     3. Moderate episode of recurrent major depressive disorder, 4. NALLELY (generalized anxiety disorder)  Not currently controlled. PHQ-2 score today = 4. Pt taking Zoloft. Pt reports feeling down due to having a difficult past few years. Pt denies SI/HI. She requests PCP send a referral to Dr. Marquez (ps), whom she has seen before - will send request to PCP. F/u with your PCP / psychiatrist for further management.    5. Essential hypertension  Stable. Pt taking losartan-HCT. Continue current treatment plan as prescribed by your PCP and f/u with your PCP for further management.    6. Mixed hyperlipidemia  Not controlled on most recent labs. Pt taking Lipitor and aspirin. F/u with your PCP for further management.  Component      Latest Ref Rng & Units 11/8/2018 7/18/2017   Cholesterol      120 - 199 mg/dL 274 (H) 168   Triglycerides      30 - 150 mg/dL 281 (H) 189 (H)   HDL      40 - 75 mg/dL 46 44   LDL Cholesterol External      63.0 - 159.0 mg/dL 171.8 (H) 86.2   Hdl/Cholesterol Ratio      20.0 - 50.0 % 16.8 (L) 26.2   Total Cholesterol/HDL Ratio      2.0 - 5.0 6.0 (H) 3.8   Non-HDL Cholesterol      mg/dL 228 124     7. Left ventricular diastolic dysfunction with preserved systolic function  Stable. 2D  Echo 4/2/14. Continue current treatment plan as prescribed by your PCP and f/u with your PCP for further management.    8. DDD (degenerative disc disease), lumbar; 9. Lumbar spondylosis  Stable. L-spine MRI 8/11/14. Pt s/p MBB. Pt taking Norco, Lyrica, and Zanaflex. Continue current treatment plan as prescribed by your PCP and pain management physician and f/u with them for further management.    10. Vitamin D deficiency disease  Controlled. Pt taking vit D suppl. Continue current treatment plan as prescribed by your PCP and f/u with your PCP for further management.  Component      Latest Ref Rng & Units 11/8/2018   Vit D, 25-Hydroxy      30 - 96 ng/mL 48     11. Gastroesophageal reflux disease, esophagitis presence not specified  Stable. Pt taking Zantac. Continue current treatment plan as prescribed by your PCP and f/u with your PCP for further management.    12. Osteopenia, unspecified location  Stable. DEXA 10/29/13. Pt taking vit D suppl. Continue current treatment plan as prescribed by your PCP and f/u with your PCP for further management.    13. Multinodular thyroid  Stable. Thyroid U/S 11/18/11. Continue current treatment plan as prescribed by your PCP and f/u with your PCP for further management.    14. Diverticulosis, 15. History of colon polyps  Stable. Colonoscopy 12/2/13. Continue current treatment plan as prescribed by your PCP and f/u with your PCP for further management.    16. Smoker  Not currently controlled. F/u with your PCP to discuss adjustments to your treatment plan.    17. Screen for colon cancer  Screening colonoscopy ordered for pt.    18. Insomnia, unspecified type  This a new problem identified during clinic visit today. Pt reports difficulty sleeping for the past few months. Follow-up with PCP for evaluation and treatment plan.    Please obtain any medical records from past / present outside medical providers and give to PCP for review and further medical recommendations.    Provided  Melita with a 5-10 year written screening schedule and personal prevention plan. Recommendations were developed using the USPSTF age appropriate recommendations. Education, counseling, and referrals were provided as needed. After Visit Summary printed and given to patient which includes a list of additional screenings\tests needed.    Follow up in about 1 year (around 11/6/2020) for HRA. F/u with PCP Dr. Mccauley and specialists as recommended for health management.    AFRICA Betancourt     I offered to discuss end of life issues, including information on how to make advance directives that the patient could use to name someone who would make medical decisions on their behalf if they became too ill to make themselves.  ___Patient declined  _X_Patient is interested, I provided paper work and offered to discuss.

## 2019-11-07 ENCOUNTER — TELEPHONE (OUTPATIENT)
Dept: ENDOSCOPY | Facility: HOSPITAL | Age: 59
End: 2019-11-07

## 2019-11-07 NOTE — TELEPHONE ENCOUNTER
Endoscopy Scheduling Questionnaire:    1. Have you been admitted overnight to the hospital in the past 3 months? no  2. Have you had a cardiac stent placed in the past 12 months? no  3. Have you had a stroke or heart attack in the past 6 months? no  4. Have you had any chest pain in the past 3 months? no      If so, have you been evaluated by your PCP or Cardiologist? no  5. Do you take prescription weight loss medication? no  6. Have you been diagnosed with Diverticulitis within the past 3 months? no  7. Are you on dialysis? no  8. Are you diabetic? yes Do you have an insulin pump? no  9. Do you have any other health issues that you feel might limit your ability to safely have the procedure and/or sedation? no  10. Is the patient over 81 yo? no        If so, has the patient been seen by their PCP or GI in the last 6 months? N/A       -I have reviewed the last colonoscopy for recommendations regarding surveillance and bowel prep  is NA  -I have reviewed the patient's medications and allergies. She is not on high risk medication, A clearance request NA  -I have verified the pharmacy information. The prep being used is Suprep. The patient's prep instructions were sent via mail..    Date Endoscopy Scheduled: Date: 1/9/2020

## 2019-11-08 RX ORDER — SODIUM, POTASSIUM,MAG SULFATES 17.5-3.13G
1 SOLUTION, RECONSTITUTED, ORAL ORAL DAILY
Qty: 1 KIT | Refills: 0 | Status: SHIPPED | OUTPATIENT
Start: 2019-11-08 | End: 2019-11-10

## 2019-11-11 RX ORDER — SERTRALINE HYDROCHLORIDE 100 MG/1
200 TABLET, FILM COATED ORAL DAILY
Qty: 180 TABLET | Refills: 0 | Status: SHIPPED | OUTPATIENT
Start: 2019-11-11 | End: 2020-01-27 | Stop reason: SDUPTHER

## 2019-11-11 RX ORDER — ATORVASTATIN CALCIUM 40 MG/1
TABLET, FILM COATED ORAL
Qty: 90 TABLET | Refills: 0 | Status: SHIPPED | OUTPATIENT
Start: 2019-11-11 | End: 2020-01-27 | Stop reason: SDUPTHER

## 2019-11-29 ENCOUNTER — OFFICE VISIT (OUTPATIENT)
Dept: OPHTHALMOLOGY | Facility: CLINIC | Age: 59
End: 2019-11-29
Payer: COMMERCIAL

## 2019-11-29 ENCOUNTER — HOSPITAL ENCOUNTER (OUTPATIENT)
Dept: RADIOLOGY | Facility: HOSPITAL | Age: 59
Discharge: HOME | End: 2019-11-29
Attending: FAMILY MEDICINE
Payer: MEDICARE

## 2019-11-29 VITALS — WEIGHT: 192.25 LBS | HEIGHT: 64 IN | BODY MASS INDEX: 32.82 KG/M2

## 2019-11-29 DIAGNOSIS — E66.01 SEVERE OBESITY (BMI 35.0-39.9) WITH COMORBIDITY: ICD-10-CM

## 2019-11-29 DIAGNOSIS — E11.9 TYPE 2 DIABETES MELLITUS WITHOUT RETINOPATHY: ICD-10-CM

## 2019-11-29 DIAGNOSIS — Z00.00 ROUTINE GENERAL MEDICAL EXAMINATION AT A HEALTH CARE FACILITY: ICD-10-CM

## 2019-11-29 DIAGNOSIS — Z12.31 ENCOUNTER FOR SCREENING MAMMOGRAM FOR BREAST CANCER: ICD-10-CM

## 2019-11-29 DIAGNOSIS — I10 ESSENTIAL HYPERTENSION: ICD-10-CM

## 2019-11-29 DIAGNOSIS — H52.13 MYOPIA WITH ASTIGMATISM AND PRESBYOPIA, BILATERAL: ICD-10-CM

## 2019-11-29 DIAGNOSIS — H52.4 MYOPIA WITH ASTIGMATISM AND PRESBYOPIA, BILATERAL: ICD-10-CM

## 2019-11-29 DIAGNOSIS — E11.9 TYPE 2 DIABETES MELLITUS WITHOUT COMPLICATION, WITHOUT LONG-TERM CURRENT USE OF INSULIN: ICD-10-CM

## 2019-11-29 DIAGNOSIS — E55.9 VITAMIN D DEFICIENCY DISEASE: ICD-10-CM

## 2019-11-29 DIAGNOSIS — H52.203 MYOPIA WITH ASTIGMATISM AND PRESBYOPIA, BILATERAL: ICD-10-CM

## 2019-11-29 DIAGNOSIS — H25.13 NUCLEAR SCLEROSIS, BILATERAL: Primary | ICD-10-CM

## 2019-11-29 PROCEDURE — 92015 DETERMINE REFRACTIVE STATE: CPT | Mod: HCNC,S$GLB,, | Performed by: OPTOMETRIST

## 2019-11-29 PROCEDURE — 92014 COMPRE OPH EXAM EST PT 1/>: CPT | Mod: HCNC,S$GLB,, | Performed by: OPTOMETRIST

## 2019-11-29 PROCEDURE — 77067 SCR MAMMO BI INCL CAD: CPT | Mod: 26,HCNC,, | Performed by: RADIOLOGY

## 2019-11-29 PROCEDURE — 92015 PR REFRACTION: ICD-10-PCS | Mod: HCNC,S$GLB,, | Performed by: OPTOMETRIST

## 2019-11-29 PROCEDURE — 99499 RISK ADDL DX/OHS AUDIT: ICD-10-PCS | Mod: HCNC,S$GLB,, | Performed by: OPTOMETRIST

## 2019-11-29 PROCEDURE — 99999 PR PBB SHADOW E&M-EST. PATIENT-LVL II: CPT | Mod: PBBFAC,HCNC,, | Performed by: OPTOMETRIST

## 2019-11-29 PROCEDURE — 77067 SCR MAMMO BI INCL CAD: CPT | Mod: TC,HCNC

## 2019-11-29 PROCEDURE — 92014 PR EYE EXAM, EST PATIENT,COMPREHESV: ICD-10-PCS | Mod: HCNC,S$GLB,, | Performed by: OPTOMETRIST

## 2019-11-29 PROCEDURE — 77067 MAMMO DIGITAL SCREENING BILAT WITH CAD: ICD-10-PCS | Mod: 26,HCNC,, | Performed by: RADIOLOGY

## 2019-11-29 PROCEDURE — 99999 PR PBB SHADOW E&M-EST. PATIENT-LVL II: ICD-10-PCS | Mod: PBBFAC,HCNC,, | Performed by: OPTOMETRIST

## 2019-11-29 PROCEDURE — 99499 UNLISTED E&M SERVICE: CPT | Mod: HCNC,S$GLB,, | Performed by: OPTOMETRIST

## 2019-11-29 NOTE — PROGRESS NOTES
HPI     Diabetic Eye Exam     Comments: Complete Ocular Diabetic Exam              Comments     Diabetic eye exam: Here for diabetic exam, last saw [facility] in 2017    Diagnosed with diabetes in: 2015 roughly    Recent vision fluctuations: PT C/O: Up close [facility] has decreased over the   last 1-2 years, having to strain to read small print, patient does not   monitor her blood sugar at home but states she has an appointment with Dr. Mccauley in January for a follow up with lab work.    Lab Results       Component                Value               Date                       HGBA1C                   5.5                 11/08/2018                 HGBA1C                   5.5                 11/08/2018                          Last edited by Sis Bragg, Patient Care Assistant on 11/29/2019  1:10   PM. (History)            Assessment /Plan     For exam results, see Encounter Report.    Nuclear sclerosis, bilateral  Follow    Type 2 diabetes mellitus without retinopathy  No diabetic retinopathy was seen in either eye today. Reviewed importance of yearly dilated eye exams. Continue close care with PCP regarding diabetes.    Myopia with astigmatism and presbyopia, bilateral   Dspubaldod today    RTC 1 yr for dilated eye exam or PRN if any problems.   Discussed above and answered questions.

## 2019-11-29 NOTE — LETTER
November 29, 2019      Janett Mccauley MD  [id number] [facility]  [address]  [place] [address]           [facility]  [address] [address]  Phone: [phone]  Fax: [phone]          Patient: Melita Romero   MR Number: [id number]   YOB: 1960   Date of Visit: 11/29/2019       Dear Dr. Janett Mccauley:    Thank you for referring Melita Romero to me for evaluation. Attached you will find relevant portions of my assessment and plan of care.    If you have questions, please do not hesitate to call me. I look forward to following Melita Romero along with you.    Sincerely,    Ron Ferrara, OD    Enclosure  CC:  No Recipients    If you would like to receive this communication electronically, please contact [email or web address] or [phone] to request more information on [employer] access.    For providers and/or their staff who would like to refer a patient to [facility], please contact us through our one-stop-shop provider referral line, [facility] , at [phone].    If you feel you have received this communication in error or would no longer like to receive these types of communications, please e-mail [email or web address]

## 2019-12-09 ENCOUNTER — PATIENT OUTREACH (OUTPATIENT)
Dept: ADMINISTRATIVE | Facility: HOSPITAL | Age: 59
End: 2019-12-09

## 2019-12-09 NOTE — PROGRESS NOTES
Patient on noncompliant HGA1C report. Attempted to call patient to schedule lab work. No answer. Left VM.

## 2020-01-09 ENCOUNTER — ANESTHESIA EVENT (OUTPATIENT)
Dept: ENDOSCOPY | Facility: HOSPITAL | Age: 60
End: 2020-01-09
Payer: MEDICARE

## 2020-01-09 ENCOUNTER — ANESTHESIA (OUTPATIENT)
Dept: ENDOSCOPY | Facility: HOSPITAL | Age: 60
End: 2020-01-09
Payer: MEDICARE

## 2020-01-09 ENCOUNTER — HOSPITAL ENCOUNTER (OUTPATIENT)
Facility: HOSPITAL | Age: 60
Discharge: HOME | End: 2020-01-09
Attending: FAMILY MEDICINE | Admitting: FAMILY MEDICINE
Payer: MEDICARE

## 2020-01-09 VITALS
DIASTOLIC BLOOD PRESSURE: 78 MMHG | OXYGEN SATURATION: 95 % | HEIGHT: 63 IN | BODY MASS INDEX: 33.17 KG/M2 | WEIGHT: 187.19 LBS | TEMPERATURE: 98 F | HEART RATE: 86 BPM | SYSTOLIC BLOOD PRESSURE: 113 MMHG | RESPIRATION RATE: 17 BRPM

## 2020-01-09 DIAGNOSIS — Z12.11 COLON CANCER SCREENING: Primary | ICD-10-CM

## 2020-01-09 DIAGNOSIS — K63.5 POLYP OF COLON, UNSPECIFIED PART OF COLON, UNSPECIFIED TYPE: ICD-10-CM

## 2020-01-09 DIAGNOSIS — K64.8 INTERNAL HEMORRHOIDS: ICD-10-CM

## 2020-01-09 DIAGNOSIS — Z12.11 SPECIAL SCREENING FOR MALIGNANT NEOPLASMS, COLON: ICD-10-CM

## 2020-01-09 DIAGNOSIS — K57.30 DIVERTICULOSIS OF COLON: ICD-10-CM

## 2020-01-09 DIAGNOSIS — Z86.010 HISTORY OF COLON POLYPS: ICD-10-CM

## 2020-01-09 LAB — POCT GLUCOSE: 111 MG/DL (ref 70–110)

## 2020-01-09 PROCEDURE — 45380 PR COLONOSCOPY,BIOPSY: ICD-10-PCS | Mod: PT,HCNC,, | Performed by: FAMILY MEDICINE

## 2020-01-09 PROCEDURE — 37000009 HC ANESTHESIA EA ADD 15 MINS: Mod: HCNC | Performed by: FAMILY MEDICINE

## 2020-01-09 PROCEDURE — 63600175 PHARM REV CODE 636 W HCPCS: Mod: HCNC | Performed by: FAMILY MEDICINE

## 2020-01-09 PROCEDURE — 88305 TISSUE EXAM BY PATHOLOGIST: CPT | Mod: 26,HCNC,, | Performed by: PATHOLOGY

## 2020-01-09 PROCEDURE — 63600175 PHARM REV CODE 636 W HCPCS: Mod: HCNC | Performed by: NURSE ANESTHETIST, CERTIFIED REGISTERED

## 2020-01-09 PROCEDURE — 27201012 HC FORCEPS, HOT/COLD, DISP: Mod: HCNC | Performed by: FAMILY MEDICINE

## 2020-01-09 PROCEDURE — 45380 COLONOSCOPY AND BIOPSY: CPT | Mod: PT,HCNC,, | Performed by: FAMILY MEDICINE

## 2020-01-09 PROCEDURE — 37000008 HC ANESTHESIA 1ST 15 MINUTES: Mod: HCNC | Performed by: FAMILY MEDICINE

## 2020-01-09 PROCEDURE — 88305 TISSUE EXAM BY PATHOLOGIST: ICD-10-PCS | Mod: 26,HCNC,, | Performed by: PATHOLOGY

## 2020-01-09 PROCEDURE — 88305 TISSUE EXAM BY PATHOLOGIST: CPT | Mod: 59,HCNC | Performed by: PATHOLOGY

## 2020-01-09 PROCEDURE — 45380 COLONOSCOPY AND BIOPSY: CPT | Mod: HCNC | Performed by: FAMILY MEDICINE

## 2020-01-09 RX ORDER — PROPOFOL 10 MG/ML
VIAL (ML) INTRAVENOUS
Status: DISCONTINUED | OUTPATIENT
Start: 2020-01-09 | End: 2020-01-09

## 2020-01-09 RX ORDER — SODIUM CHLORIDE 0.9 % (FLUSH) 0.9 %
10 SYRINGE (ML) INJECTION
Status: CANCELLED | OUTPATIENT
Start: 2020-01-09

## 2020-01-09 RX ORDER — SODIUM CHLORIDE, SODIUM LACTATE, POTASSIUM CHLORIDE, CALCIUM CHLORIDE 600; 310; 30; 20 MG/100ML; MG/100ML; MG/100ML; MG/100ML
INJECTION, SOLUTION INTRAVENOUS CONTINUOUS
Status: DISCONTINUED | OUTPATIENT
Start: 2020-01-09 | End: 2020-01-09 | Stop reason: HOSPADM

## 2020-01-09 RX ORDER — FAMOTIDINE 10 MG/1
10 TABLET ORAL 2 TIMES DAILY
COMMUNITY
End: 2020-07-02

## 2020-01-09 RX ADMIN — SODIUM CHLORIDE, SODIUM LACTATE, POTASSIUM CHLORIDE, AND CALCIUM CHLORIDE: 600; 310; 30; 20 INJECTION, SOLUTION INTRAVENOUS at 10:01

## 2020-01-09 RX ADMIN — PROPOFOL 80 MG: 10 INJECTION, EMULSION INTRAVENOUS at 10:01

## 2020-01-09 RX ADMIN — PROPOFOL 50 MG: 10 INJECTION, EMULSION INTRAVENOUS at 10:01

## 2020-01-09 NOTE — ANESTHESIA PREPROCEDURE EVALUATION
01/09/2020  Melita Romero is a 59 y.o., female.    Anesthesia Evaluation    I have reviewed the Patient Summary Reports.    I have reviewed the Nursing Notes.   I have reviewed the Medications.     Review of Systems  Anesthesia Hx:  No problems with previous Anesthesia Denies Hx of Anesthetic complications  Denies Family Hx of Anesthesia complications.   Denies Personal Hx of Anesthesia complications.   Social:  Smoker, Alcohol Use    Hematology/Oncology:  Hematology Normal   Oncology Normal     EENT/Dental:EENT/Dental Normal   Cardiovascular:   Hypertension    Pulmonary:  Pulmonary Normal    Renal/:  Renal/ Normal     Hepatic/GI:   GERD    Musculoskeletal:   Arthritis     Neurological:  Neurology Normal    Endocrine:   Diabetes, type 2    Dermatological:  Skin Normal    Psych:   anxiety depression          Physical Exam  General:  Obesity    Airway/Jaw/Neck:  Airway Findings: Mouth Opening: Normal Tongue: Normal  General Airway Assessment: Adult  Mallampati: II  TM Distance: Normal, at least 6 cm                 Anesthesia Plan  Type of Anesthesia, risks & benefits discussed:  Anesthesia Type:  MAC  Patient's Preference:   Intra-op Monitoring Plan: standard ASA monitors  Intra-op Monitoring Plan Comments:   Post Op Pain Control Plan:   Post Op Pain Control Plan Comments:   Induction:   IV  Beta Blocker:  Patient is not currently on a Beta-Blocker (No further documentation required).       Informed Consent: Patient understands risks and agrees with Anesthesia plan.  Questions answered. Anesthesia consent signed with patient.  ASA Score: 2     Day of Surgery Review of History & Physical: I have interviewed and examined the patient. I have reviewed the patient's H&P dated:    H&P update referred to the provider.         Ready For Surgery From Anesthesia Perspective.

## 2020-01-09 NOTE — DISCHARGE INSTRUCTIONS
Diverticulosis    Diverticulosis means that small pouches have formed in the wall of your large intestine (colon). Most often, this problem causes no symptoms and is common as people age. But the pouches in the colon are at risk of becoming infected. When this happens, the condition is called diverticulitis. Although most people with diverticulosis never develop diverticulitis, it is still not uncommon. Rectal bleeding can also occur and in less common situations, a type of colon inflammation called colitis.  While most people do not have symptoms, some people with diverticulosis may have:  · Abdominal cramps and pain  · Bloating  · Constipation  · Change in bowel habits  Causes  The exact cause of diverticulosis (and diverticulitis) has not been proved, but a few things are associated with the condition:  · Low-fiber diet  · Constipation  · Lack of exercise  Your healthcare provider will talk with you about how to manage your condition. Diet changes may be all that are needed to help control diverticulosis and prevent progression to diverticulitis. If you develop diverticulitis, you will likely need other treatments.  Home care  You may be told to take fiber supplements daily. Fiber adds bulk to the stool so that it passes through the colon more easily. Stool softeners may be recommended. You may also be given medications for pain relief. Be sure to take all medications as directed.  In the past, people were told to avoid corn, nuts, and seeds. This is no longer necessary.  Follow these guidelines when caring for yourself at home:  · Eat unprocessed foods that are high in fiber. Whole grains, fruits, and vegetables are good choices.  · Drink 6 to 8 glasses of water every day unless your healthcare provider has you limit how much fluid you should have.  · Watch for changes in your bowel movements. Tell your provider if you notice any changes.  · Begin an exercise program. Ask your provider how to get started.  Generally, walking is the best.  · Get plenty of rest and sleep.  Follow-up care  Follow up with your healthcare provider, or as advised. Regular visits may be needed to check on your health. Sometimes special procedures such as colonoscopy, are needed after an episode of diverticulitis or blooding. Be sure to keep all your appointments.  If a stool sample was taken, or cultures were done, you should be told if they are positive, or if your treatment needs to be changed. You can call as directed for the results.  If X-rays were done, a radiologist will look at them. You will be told if there is a change in your treatment.  If antibiotics were prescribed, be sure to finish them all.  When to seek medical advice  Call your healthcare provider right away if any of these occur:  · Fever of 100.4°F (38°C) or higher, or as directed by your healthcare provider  · Severe cramps in the lower left side of the abdomen or pain that is getting worse  · Tenderness in the lower left side of the abdomen or worsening pain throughout the abdomen  · Diarrhea or constipation that doesn't get better within 24 hours  · Nausea and vomiting  · Bleeding from the rectum  Call 911  Call emergency services if any of the following occur:  · Trouble breathing  · Confusion  · Very drowsy or trouble awakening  · Fainting or loss of consciousness  · Rapid heart rate  · Chest pain  Date Last Reviewed: 12/30/2015 © 2000-2017 [employer]. [address] [place] [address]. All rights reserved. This information is not intended as a substitute for professional medical care. Always follow your healthcare professional's instructions.        Understanding Colon and Rectal Polyps    The colon (also called the large intestine) is a muscular tube that forms the last part of the digestive tract. It absorbs water and stores food waste. The colon is about 4 to 6 feet long. The rectum is the last 6 inches of the colon. The colon and rectum  have a smooth lining composed of millions of cells. Changes in these cells can lead to growths in the colon that can become cancerous and should be removed. Multiple tests are available to screen for colon cancer, but the colonoscopy is the most recommended test. During colonoscopy, these polyps can be removed. How often you need this test depends on many things including your condition, your family history, symptoms, and what the findings were at the previous colonoscopy.   When the colon lining changes  Changes that happen in the cells that line the colon or rectum can lead to growths called polyps. Over a period of years, polyps can turn cancerous. Removing polyps early may prevent cancer from ever forming.  Polyps  Polyps are fleshy clumps of tissue that form on the lining of the colon or rectum. Small polyps are usually benign (not cancerous). However, over time, cells in a polyp can change and become cancerous. Certain types of polyps known as adenomatous polyps are premalignant. The risk for invasive cancer increases with the size of the polyp and certain cell and gene features. This means that they can become cancerous if they're not removed. Hyperplastic polyps are benign. They can grow quite large and not turn cancerous.   Cancer  Almost all colorectal cancers start when polyp cells begin growing abnormally. As a cancerous tumor grows, it may involve more and more of the colon or rectum. In time, cancer can also grow beyond the colon or rectum and spread to nearby organs or to glands called lymph nodes. The cells can also travel to other parts of the body. This is known as metastasis. The earlier a cancerous tumor is removed, the better the chance of preventing its spread.    Date Last Reviewed: 8/1/2016  © 3822-2866 [employer]. [address] [place] [address], [place] [address]. All rights reserved. This information is not intended as a substitute for professional medical care. Always follow your  healthcare professional's instructions.

## 2020-01-09 NOTE — ANESTHESIA POSTPROCEDURE EVALUATION
Anesthesia Post Evaluation    Patient: Melita Romero    Procedure(s) Performed: Procedure(s) (LRB):  COLONOSCOPY (N/A)    Final Anesthesia Type: MAC    Patient location during evaluation: GI PACU  Patient participation: Yes- Able to Participate  Level of consciousness: awake and alert and oriented  Post-procedure vital signs: reviewed and stable  Pain management: adequate  Airway patency: patent    PONV status at discharge: No PONV  Anesthetic complications: no      Cardiovascular status: blood pressure returned to baseline, stable and hemodynamically stable  Respiratory status: unassisted, room air and spontaneous ventilation  Hydration status: euvolemic  Follow-up not needed.          Vitals Value Taken Time   /78 1/9/2020 11:06 AM   Temp 36.7 °C (98 °F) 1/9/2020 10:56 AM   Pulse 86 1/9/2020 11:06 AM   Resp 17 1/9/2020 11:06 AM   SpO2 95 % 1/9/2020 11:06 AM         Event Time     Out of Recovery 11:14:15          Pain/Ray Score: Ray Score: 10 (1/9/2020 11:06 AM)

## 2020-01-09 NOTE — PLAN OF CARE
Dr bryant came to bedside and discussed findings. NO N/V,  no abdominal pain, no GI bleeding, and vitals stable.  Pt discharged from unit.

## 2020-01-09 NOTE — TRANSFER OF CARE
"Anesthesia Transfer of Care Note    Patient: Melita Romero    Procedure(s) Performed: Procedure(s) (LRB):  COLONOSCOPY (N/A)    Patient location: GI    Anesthesia Type: MAC    Transport from OR: Transported from OR on room air with adequate spontaneous ventilation    Post pain: adequate analgesia    Post assessment: no apparent anesthetic complications    Post vital signs: stable    Level of consciousness: awake, alert and oriented    Nausea/Vomiting: no nausea/vomiting    Complications: none    Transfer of care protocol was followed      Last vitals:   Visit Vitals  BP (!) 127/93 (BP Location: Left arm, Patient Position: Lying)   Pulse 92   Temp 36.1 °C (97 °F)   Resp 10   Ht 5' 3" (1.6 m)   Wt 84.9 kg (187 lb 2.7 oz)   SpO2 95%   Breastfeeding? No   BMI 33.16 kg/m²     "

## 2020-01-09 NOTE — H&P
Short Stay Endoscopy History and Physical    PCP - Janett Mccauley MD    Procedure - Colonoscopy  ASA - 2  Mallampati - per anesthesia  History of Anesthesia problems - no  Family history Anesthesia problems -  no     HPI:  This is a 59 y.o. female here for evaluation of :   Active Hospital Problems    Diagnosis  POA    *Colon cancer screening [Z12.11]  Not Applicable    History of colon polyps [Z86.010]  Not Applicable      Resolved Hospital Problems   No resolved problems to display.         Health Maintenance       Date Due Completion Date    Shingles Vaccine (1 of 2) 12/07/2012 ---    DEXA SCAN 10/29/2018 10/29/2013    Override on 9/14/2009: Done (REPEAT 3 YRS)    Colonoscopy 12/02/2018 12/2/2013    Hemoglobin A1c 05/08/2019 11/8/2018    Foot Exam 11/08/2019 11/8/2018    Override on 7/25/2017: Done    Lipid Panel 11/08/2019 11/8/2018    Urine Microalbumin 11/08/2019 11/8/2018    Mammogram 11/29/2020 11/29/2019    Override on 8/15/2012: Done    Override on 6/15/2011: Done    Override on 9/14/2009: Done    Eye Exam 11/29/2020 11/29/2019    Override on 11/29/2019: Done    Override on 9/27/2017: Done    Override on 4/20/2015: Not Clinically Appropriate (NOT DIABETIC)    Override on 5/10/2011: Done    Low Dose Statin 01/09/2021 1/9/2020    TETANUS VACCINE 07/10/2022 7/10/2012          Screening - yes  History of polyps - yes  Diarrhea - no  Anemia - no  Blood in stools - no  Abdominal pain - no  Other - no    ROS:  CONSTITUTIONAL: Denies weight change,  fatigue, fevers, chills, night sweats.  CARDIOVASCULAR: Denies chest pain, shortness of breath, orthopnea and edema.  RESPIRATORY: Denies cough, hemoptysis, dyspnea, and wheezing.  GI: See HPI.    Medical History:   Past Medical History:   Diagnosis Date    Abnormal glucose     Anxiety     Arthritis     Back pain     Controlled type 2 diabetes mellitus without complication, without long-term current use of insulin 2/27/2017    DDD (degenerative disc disease),  lumbar     Depression     Fibromyalgia     Hyperlipidemia     Hypertension     Thyroid disease     Tobacco dependence     Trouble in sleeping     Urinary incontinence     wears pads daily       Surgical History:   Past Surgical History:   Procedure Laterality Date    HYSTERECTOMY  2003    complete    OOPHORECTOMY      SHOULDER ARTHROSCOPY Right 05/16/2017    Dr. Nima Coyne    TONSILLECTOMY         Family History:   Family History   Problem Relation Age of Onset    Glaucoma Mother     Diabetes Mother     Hypertension Brother     Diabetes Brother     Heart disease Maternal Uncle        Social History:   Social History     Tobacco Use    Smoking status: Current Every Day Smoker     Packs/day: 0.50     Years: 25.00     Pack years: 12.50    Smokeless tobacco: Never Used   Substance Use Topics    Alcohol use: Yes     Alcohol/week: 0.0 standard drinks     Comment: rarely glass of wine    Drug use: No       Allergies:   Review of patient's allergies indicates:   Allergen Reactions    Fish containing products Other (See Comments)     Difficulty breathing    Iodine Other (See Comments)    Shellfish containing products      Other reaction(s): Difficulty breathing  Other reaction(s): Difficulty breathing    Latex Rash       Medications:   No current facility-administered medications on file prior to encounter.      Current Outpatient Medications on File Prior to Encounter   Medication Sig Dispense Refill    aspirin 81 mg Tab Take 1 tablet by mouth Daily.      cholecalciferol, vitamin D3, 2,000 unit Tab Take 1 tablet by mouth Daily.      famotidine (PEPCID) 10 MG tablet Take 10 mg by mouth 2 (two) times daily.      hydrocodone-acetaminophen 10-325mg (NORCO)  mg Tab Take 1 tablet by mouth every 8 (eight) hours as needed. 75 tablet 0    losartan-hydrochlorothiazide 100-25 mg (HYZAAR) 100-25 mg per tablet Take 1 tablet by mouth once daily. 90 tablet 3    metFORMIN (GLUCOPHAGE-XR) 500 MG 24 hr  tablet Take 1 tablet (500 mg total) by mouth 2 (two) times daily with meals. 180 tablet 3    multivitamin-Ca-iron-minerals (ESSENTIAL DAILY) 18-0.4 mg Tab Take 1 tablet by mouth Daily.      tiZANidine (ZANAFLEX) 4 MG tablet Take 1 tablet (4 mg total) by mouth every 6 (six) hours as needed. 360 tablet 3    albuterol 90 mcg/actuation inhaler Inhale 2 puffs into the lungs every 4 (four) hours as needed for Wheezing. (Patient not taking: Reported on 11/6/2019) 1 Inhaler 1    nystatin-triamcinolone (MYCOLOG II) cream Apply topically 4 (four) times daily. aaa (Patient not taking: Reported on 11/6/2019) 60 g 3    pregabalin (LYRICA) 100 MG capsule Take 1 capsule (100 mg total) by mouth once daily. (Patient not taking: Reported on 11/29/2019) 90 capsule 1    ranitidine (ZANTAC) 150 MG tablet Take 2 tablets (300 mg total) by mouth nightly. (Patient taking differently: Take 150 mg by mouth once daily. ) 90 tablet 3       Physical Exam:  Vital Signs:   Vitals:    01/09/20 0936   BP: (!) 127/93   Pulse: 92   Resp: 10   Temp: 97 °F (36.1 °C)     General Appearance: Well appearing in no acute distress  ENT: OP clear  Chest: CTA B  CV: RRR, no m/r/g  Abd: s/nt/nd/nabs  Ext: no edema    Labs:Reviewed    IMP:  Active Hospital Problems    Diagnosis  POA    *Colon cancer screening [Z12.11]  Not Applicable    History of colon polyps [Z86.010]  Not Applicable      Resolved Hospital Problems   No resolved problems to display.         Plan:   I have explained the risks and benefits of colonoscopy to the patient including but not limited to bleeding, perforation, infection, and death. The patient wishes to proceed.

## 2020-01-09 NOTE — [employer] PATIENT INSTRUCTIONS
Discharge Summary/Instructions after an Endoscopic Procedure  Patient Name: Melita Romero  Patient MRN: [id number]  Patient YOB: 1960 Thursday, January 09, 2020 Jacob Lauren MD  RESTRICTIONS:  During your procedure today, you received medications for sedation.  These   medications may affect your judgment, balance and coordination.  Therefore,   for 24 hours, you have the following restrictions:   - DO NOT drive a car, operate machinery, make legal/financial decisions,   sign important papers or drink alcohol.    ACTIVITY:  Today: no heavy lifting, straining or running due to procedural   sedation/anesthesia.  The following day: return to full activity including work.  DIET:  Eat and drink normally unless instructed otherwise.     TREATMENT FOR COMMON SIDE EFFECTS:  - Mild abdominal pain, nausea, belching, bloating or excessive gas:  rest,   eat lightly and use a heating pad.  - Sore Throat: treat with throat lozenges and/or gargle with warm salt   water.  - Because air was used during the procedure, expelling large amounts of air   from your rectum or belching is normal.  - If a bowel prep was taken, you may not have a bowel movement for 1-3 days.    This is normal.  SYMPTOMS TO WATCH FOR AND REPORT TO YOUR PHYSICIAN:  1. Abdominal pain or bloating, other than gas cramps.  2. Chest pain.  3. Back pain.  4. Signs of infection such as: chills or fever occurring within 24 hours   after the procedure.  5. Rectal bleeding, which would show as bright red, maroon, or black stools.   (A tablespoon of blood from the rectum is not serious, especially if   hemorrhoids are present.)  6. Vomiting.  7. Weakness or dizziness.  GO DIRECTLY TO THE NEAREST EMERGENCY ROOM IF YOU HAVE ANY OF THE FOLLOWING:      Difficulty breathing              Chills and/or fever over 101 F   Persistent vomiting and/or vomiting blood   Severe abdominal pain   Severe chest pain   Black, tarry stools   Bleeding- more than one  tablespoon   Any other symptom or condition that you feel may need urgent attention  Your doctor recommends these additional instructions:  If any biopsies were taken, your doctors clinic will contact you in 1 to 2   weeks with any results.  - Patient has a contact number available for emergencies.  The signs and   symptoms of potential delayed complications were discussed with the   patient.  Return to normal activities tomorrow.  Written discharge   instructions were provided to the patient.   - Resume previous diet.   - Continue present medications.   - Await pathology results.   - Repeat colonoscopy in 5 years for surveillance.   - Telephone my office for pathology results in 2 weeks.   - Discharge patient to home (via wheelchair).  For questions, problems or results please call your physician Jacob Lauren MD at Work:  [phone]  If you have any questions about the above instructions, call the GI   department at [phone] or call the endoscopy unit at [phone]   from 7am until 3 pm.  [facility] [place], EMERGENCY ROOM PHONE NUMBER:   [phone]  IF A COMPLICATION OR EMERGENCY SITUATION ARISES AND YOU ARE UNABLE TO REACH   YOUR PHYSICIAN - GO DIRECTLY TO THE EMERGENCY ROOM.  I have read or have had read to me these discharge instructions for my   procedure and have received a written copy.  I understand these   instructions and will follow-up with my physician if I have any questions.     __________________________________       _____________________________________  Nurse Signature                                          Patient/Designated   Responsible Party Signature  Jacob Lauren MD  1/9/2020 10:50:36 AM  This report has been verified and signed electronically.  [employer]

## 2020-01-09 NOTE — DISCHARGE SUMMARY
Endoscopy Discharge Summary      Admit Date: 1/9/2020    Discharge Date and Time:  1/9/2020 10:51 AM    Attending Physician: Jacob Lauren MD     Discharge Physician: Jacob Lauren MD     Principal Admitting Diagnoses: Colon cancer screening         Discharge Diagnosis: The primary encounter diagnosis was Colon cancer screening. Diagnoses of Special screening for malignant neoplasms, colon, History of colon polyps, Polyp of colon, unspecified part of colon, unspecified type, Diverticulosis of colon, and Internal hemorrhoids were also pertinent to this visit.     Discharged Condition: Good    Indication for Admission: Colon cancer screening     Hospital Course: Patient was admitted for an inpatient procedure and tolerated the procedure well with no complications.    Significant Diagnostic Studies: Colonoscopy with cold biopsy polypectomy    Pathology (if any):  Specimen (12h ago, onward)    None          Estimated Blood Loss: 1 ml.    Discussed with: patient and family.    Disposition: Home.    Follow Up/Patient Instructions:   Current Discharge Medication List      CONTINUE these medications which have NOT CHANGED    Details   aspirin 81 mg Tab Take 1 tablet by mouth Daily.      atorvastatin (LIPITOR) 40 MG tablet TAKE 1 TABLET BY MOUTH EVERY DAY  Qty: 90 tablet, Refills: 0      cholecalciferol, vitamin D3, 2,000 unit Tab Take 1 tablet by mouth Daily.      famotidine (PEPCID) 10 MG tablet Take 10 mg by mouth 2 (two) times daily.      hydrocodone-acetaminophen 10-325mg (NORCO)  mg Tab Take 1 tablet by mouth every 8 (eight) hours as needed.  Qty: 75 tablet, Refills: 0      losartan-hydrochlorothiazide 100-25 mg (HYZAAR) 100-25 mg per tablet Take 1 tablet by mouth once daily.  Qty: 90 tablet, Refills: 3      metFORMIN (GLUCOPHAGE-XR) 500 MG 24 hr tablet Take 1 tablet (500 mg total) by mouth 2 (two) times daily with meals.  Qty: 180 tablet, Refills: 3      multivitamin-Ca-iron-minerals (ESSENTIAL DAILY)  18-0.4 mg Tab Take 1 tablet by mouth Daily.      sertraline (ZOLOFT) 100 MG tablet TAKE 2 TABLETS (200 MG TOTAL) BY MOUTH ONCE DAILY.  Qty: 180 tablet, Refills: 0      tiZANidine (ZANAFLEX) 4 MG tablet Take 1 tablet (4 mg total) by mouth every 6 (six) hours as needed.  Qty: 360 tablet, Refills: 3      flu vacc [id number] 6mos up,PF, (FLUARIX QUAD [id number], PF,) 60 mcg (15 mcg x 4)/0.5 mL Syrg Use as directed  Qty: 0.5 mL, Refills: 0         STOP taking these medications       albuterol 90 mcg/actuation inhaler Comments:   Reason for Stopping:         nystatin-triamcinolone (MYCOLOG II) cream Comments:   Reason for Stopping:         pregabalin (LYRICA) 100 MG capsule Comments:   Reason for Stopping:         ranitidine (ZANTAC) 150 MG tablet Comments:   Reason for Stopping:               Discharge Procedure Orders   Diet general     Call MD for:  temperature >100.4     Call MD for:  persistent nausea and vomiting     Call MD for:  severe uncontrolled pain     Call MD for:  difficulty breathing, headache or visual disturbances     Call MD for:  redness, tenderness, or signs of infection (pain, swelling, redness, odor or green/yellow discharge around incision site)     Call MD for:  hives     Call MD for:  persistent dizziness or light-headedness     No dressing needed       Follow-up Information     Jacob Lauren MD.    Specialty:  Family Medicine  Contact information:  [address] [facility] [place] [address] [phone]

## 2020-01-13 ENCOUNTER — PATIENT MESSAGE (OUTPATIENT)
Dept: INTERNAL MEDICINE | Facility: CLINIC | Age: 60
End: 2020-01-13

## 2020-01-13 DIAGNOSIS — E11.9 CONTROLLED TYPE 2 DIABETES MELLITUS WITHOUT COMPLICATION, WITHOUT LONG-TERM CURRENT USE OF INSULIN: ICD-10-CM

## 2020-01-13 DIAGNOSIS — E78.2 MIXED HYPERLIPIDEMIA: ICD-10-CM

## 2020-01-13 DIAGNOSIS — I10 ESSENTIAL HYPERTENSION: ICD-10-CM

## 2020-01-13 DIAGNOSIS — E55.9 VITAMIN D DEFICIENCY DISEASE: ICD-10-CM

## 2020-01-13 DIAGNOSIS — Z00.00 WELLNESS EXAMINATION: Primary | ICD-10-CM

## 2020-01-15 LAB
FINAL PATHOLOGIC DIAGNOSIS: NORMAL
GROSS: NORMAL

## 2020-01-16 NOTE — PROGRESS NOTES
Dear Janett Mccauley MD,    I recently cared for Melita Romero and performed an endoscopy.  Tissue was sent for pathology evaluation and I will have a letter written to ask the patient to repeat the colonoscopy in 5 years.  The pathology showed that there was adenomatous tissue present.  Thank you for allowing me to participate in the care of your patient.  Please call me for any questions that you might have.      Dr. Jacob Lauren  [phone] cell  [phone] office      NURSING STAFF:Please  inform the patient that I reviewed the recent pathology obtained at the time of colonoscopy.    The results showed that there was adenomatous tissue present which is benign and based on that, I recommend that the patient have a repeat colonoscopy performed in 5 years.     If the patient has MyChart, this message has been sent to them.  Confirm that they read the note.  If not, copy the information and print a letter to send to the patient at this time.  Confirm that a notation to the PCP was done.      Dear Melita Romero,    This is to inform you that I have reviewed your recent colonoscopy pathology.  The results showed that you had adenomatous tissue present which is benign and based on that, I recommend that you have a repeat colonoscopy performed in 5 years.      Dr. Jacob Lauren  [phone]

## 2020-01-21 ENCOUNTER — PATIENT OUTREACH (OUTPATIENT)
Dept: ADMINISTRATIVE | Facility: HOSPITAL | Age: 60
End: 2020-01-21

## 2020-01-21 DIAGNOSIS — M47.816 LUMBAR SPONDYLOSIS: ICD-10-CM

## 2020-01-21 DIAGNOSIS — E11.9 CONTROLLED TYPE 2 DIABETES MELLITUS WITHOUT COMPLICATION, WITHOUT LONG-TERM CURRENT USE OF INSULIN: ICD-10-CM

## 2020-01-21 DIAGNOSIS — M81.8 OTHER OSTEOPOROSIS WITHOUT CURRENT PATHOLOGICAL FRACTURE: ICD-10-CM

## 2020-01-21 DIAGNOSIS — M89.9 BONE DISORDER: ICD-10-CM

## 2020-01-21 DIAGNOSIS — M85.80 OSTEOPENIA, UNSPECIFIED LOCATION: ICD-10-CM

## 2020-01-21 DIAGNOSIS — M51.36 DDD (DEGENERATIVE DISC DISEASE), LUMBAR: Primary | ICD-10-CM

## 2020-01-21 NOTE — PROGRESS NOTES
HM reviewed.   Immunizations abstracted.  Care Everywhere abstracted. No new results found.  Talked to pt on phone. Agreed to scheduled labs 1/23/2020 and dexa scan 1/24/2020. Scheduled and linked both accordingly.  Health Maintenance Due   Topic    DEXA SCAN: 1/24/2020    Hemoglobin A1c: 1/23/2020    DM Foot Exam     Lipid Panel: 1/23/2020      Previsit chart audit completed.  *KDL*

## 2020-01-23 ENCOUNTER — LAB VISIT (OUTPATIENT)
Dept: LAB | Facility: HOSPITAL | Age: 60
End: 2020-01-23
Attending: FAMILY MEDICINE
Payer: MEDICARE

## 2020-01-23 DIAGNOSIS — E78.2 MIXED HYPERLIPIDEMIA: ICD-10-CM

## 2020-01-23 DIAGNOSIS — E55.9 VITAMIN D DEFICIENCY DISEASE: ICD-10-CM

## 2020-01-23 DIAGNOSIS — E11.9 CONTROLLED TYPE 2 DIABETES MELLITUS WITHOUT COMPLICATION, WITHOUT LONG-TERM CURRENT USE OF INSULIN: ICD-10-CM

## 2020-01-23 DIAGNOSIS — I10 ESSENTIAL HYPERTENSION: ICD-10-CM

## 2020-01-23 DIAGNOSIS — Z00.00 WELLNESS EXAMINATION: ICD-10-CM

## 2020-01-23 LAB
ALBUMIN SERPL BCP-MCNC: 4.4 G/DL (ref 3.5–5.2)
ALP SERPL-CCNC: 67 U/L (ref 55–135)
ALT SERPL W/O P-5'-P-CCNC: 20 U/L (ref 10–44)
ANION GAP SERPL CALC-SCNC: 14 MMOL/L (ref 8–16)
AST SERPL-CCNC: 25 U/L (ref 10–40)
BASOPHILS # BLD AUTO: 0.06 K/UL (ref 0–0.2)
BASOPHILS NFR BLD: 0.6 % (ref 0–1.9)
BILIRUB SERPL-MCNC: 0.3 MG/DL (ref 0.1–1)
BUN SERPL-MCNC: 11 MG/DL (ref 6–20)
CALCIUM SERPL-MCNC: 10.1 MG/DL (ref 8.7–10.5)
CHLORIDE SERPL-SCNC: 101 MMOL/L (ref 95–110)
CHOLEST SERPL-MCNC: 166 MG/DL (ref 120–199)
CHOLEST/HDLC SERPL: 3.4 {RATIO} (ref 2–5)
CO2 SERPL-SCNC: 27 MMOL/L (ref 23–29)
CREAT SERPL-MCNC: 0.8 MG/DL (ref 0.5–1.4)
DIFFERENTIAL METHOD: ABNORMAL
EOSINOPHIL # BLD AUTO: 0.2 K/UL (ref 0–0.5)
EOSINOPHIL NFR BLD: 1.9 % (ref 0–8)
ERYTHROCYTE [DISTWIDTH] IN BLOOD BY AUTOMATED COUNT: 12.5 % (ref 11.5–14.5)
EST. GFR  (AFRICAN AMERICAN): >60 ML/MIN/1.73 M^2
EST. GFR  (NON AFRICAN AMERICAN): >60 ML/MIN/1.73 M^2
GLUCOSE SERPL-MCNC: 88 MG/DL (ref 70–110)
HCT VFR BLD AUTO: 46.8 % (ref 37–48.5)
HDLC SERPL-MCNC: 49 MG/DL (ref 40–75)
HDLC SERPL: 29.5 % (ref 20–50)
HGB BLD-MCNC: 14.9 G/DL (ref 12–16)
IMM GRANULOCYTES # BLD AUTO: 0.03 K/UL (ref 0–0.04)
IMM GRANULOCYTES NFR BLD AUTO: 0.3 % (ref 0–0.5)
LDLC SERPL CALC-MCNC: 89 MG/DL (ref 63–159)
LYMPHOCYTES # BLD AUTO: 1.9 K/UL (ref 1–4.8)
LYMPHOCYTES NFR BLD: 18.8 % (ref 18–48)
MCH RBC QN AUTO: 31.8 PG (ref 27–31)
MCHC RBC AUTO-ENTMCNC: 31.8 G/DL (ref 32–36)
MCV RBC AUTO: 100 FL (ref 82–98)
MONOCYTES # BLD AUTO: 0.6 K/UL (ref 0.3–1)
MONOCYTES NFR BLD: 5.9 % (ref 4–15)
NEUTROPHILS # BLD AUTO: 7.2 K/UL (ref 1.8–7.7)
NEUTROPHILS NFR BLD: 72.5 % (ref 38–73)
NONHDLC SERPL-MCNC: 117 MG/DL
NRBC BLD-RTO: 0 /100 WBC
PLATELET # BLD AUTO: 310 K/UL (ref 150–350)
PMV BLD AUTO: 9.5 FL (ref 9.2–12.9)
POTASSIUM SERPL-SCNC: 3.9 MMOL/L (ref 3.5–5.1)
PROT SERPL-MCNC: 7.6 G/DL (ref 6–8.4)
RBC # BLD AUTO: 4.69 M/UL (ref 4–5.4)
SODIUM SERPL-SCNC: 142 MMOL/L (ref 136–145)
T4 FREE SERPL-MCNC: 0.72 NG/DL (ref 0.71–1.51)
TRIGL SERPL-MCNC: 140 MG/DL (ref 30–150)
TSH SERPL DL<=0.005 MIU/L-ACNC: 1.91 UIU/ML (ref 0.4–4)
WBC # BLD AUTO: 9.97 K/UL (ref 3.9–12.7)

## 2020-01-23 PROCEDURE — 80061 LIPID PANEL: CPT | Mod: HCNC

## 2020-01-23 PROCEDURE — 85025 COMPLETE CBC W/AUTO DIFF WBC: CPT | Mod: HCNC

## 2020-01-23 PROCEDURE — 84443 ASSAY THYROID STIM HORMONE: CPT | Mod: HCNC

## 2020-01-23 PROCEDURE — 84439 ASSAY OF FREE THYROXINE: CPT | Mod: HCNC

## 2020-01-23 PROCEDURE — 83036 HEMOGLOBIN GLYCOSYLATED A1C: CPT | Mod: HCNC

## 2020-01-23 PROCEDURE — 80053 COMPREHEN METABOLIC PANEL: CPT | Mod: HCNC

## 2020-01-23 PROCEDURE — 36415 COLL VENOUS BLD VENIPUNCTURE: CPT | Mod: HCNC,PO

## 2020-01-23 PROCEDURE — 82306 VITAMIN D 25 HYDROXY: CPT | Mod: HCNC

## 2020-01-24 ENCOUNTER — APPOINTMENT (OUTPATIENT)
Dept: RADIOLOGY | Facility: HOSPITAL | Age: 60
End: 2020-01-24
Attending: FAMILY MEDICINE
Payer: MEDICARE

## 2020-01-24 DIAGNOSIS — M85.80 OSTEOPENIA, UNSPECIFIED LOCATION: ICD-10-CM

## 2020-01-24 DIAGNOSIS — M81.8 OTHER OSTEOPOROSIS WITHOUT CURRENT PATHOLOGICAL FRACTURE: ICD-10-CM

## 2020-01-24 DIAGNOSIS — M47.816 LUMBAR SPONDYLOSIS: ICD-10-CM

## 2020-01-24 DIAGNOSIS — M89.9 BONE DISORDER: ICD-10-CM

## 2020-01-24 DIAGNOSIS — M51.36 DDD (DEGENERATIVE DISC DISEASE), LUMBAR: ICD-10-CM

## 2020-01-24 LAB
25(OH)D3+25(OH)D2 SERPL-MCNC: 54 NG/ML (ref 30–96)
ESTIMATED AVG GLUCOSE: 117 MG/DL (ref 68–131)
HBA1C MFR BLD HPLC: 5.7 % (ref 4–5.6)

## 2020-01-24 PROCEDURE — 77080 DEXA BONE DENSITY SPINE HIP: ICD-10-PCS | Mod: 26,HCNC,, | Performed by: RADIOLOGY

## 2020-01-24 PROCEDURE — 77080 DXA BONE DENSITY AXIAL: CPT | Mod: 26,HCNC,, | Performed by: RADIOLOGY

## 2020-01-24 PROCEDURE — 77080 DXA BONE DENSITY AXIAL: CPT | Mod: TC,HCNC

## 2020-01-27 ENCOUNTER — OFFICE VISIT (OUTPATIENT)
Dept: INTERNAL MEDICINE | Facility: CLINIC | Age: 60
End: 2020-01-27
Payer: MEDICARE

## 2020-01-27 VITALS
BODY MASS INDEX: 34.06 KG/M2 | WEIGHT: 192.25 LBS | HEIGHT: 63 IN | SYSTOLIC BLOOD PRESSURE: 124 MMHG | DIASTOLIC BLOOD PRESSURE: 82 MMHG

## 2020-01-27 DIAGNOSIS — E11.36 TYPE 2 DIABETES MELLITUS WITH DIABETIC CATARACT, WITHOUT LONG-TERM CURRENT USE OF INSULIN: ICD-10-CM

## 2020-01-27 DIAGNOSIS — E78.2 MIXED HYPERLIPIDEMIA: ICD-10-CM

## 2020-01-27 DIAGNOSIS — F33.1 MODERATE EPISODE OF RECURRENT MAJOR DEPRESSIVE DISORDER: ICD-10-CM

## 2020-01-27 DIAGNOSIS — M51.36 DDD (DEGENERATIVE DISC DISEASE), LUMBAR: ICD-10-CM

## 2020-01-27 DIAGNOSIS — Z72.0 TOBACCO ABUSE: ICD-10-CM

## 2020-01-27 DIAGNOSIS — F41.1 GAD (GENERALIZED ANXIETY DISORDER): Primary | ICD-10-CM

## 2020-01-27 DIAGNOSIS — I10 ESSENTIAL HYPERTENSION: ICD-10-CM

## 2020-01-27 DIAGNOSIS — E66.01 SEVERE OBESITY (BMI 35.0-39.9) WITH COMORBIDITY: ICD-10-CM

## 2020-01-27 DIAGNOSIS — E55.9 VITAMIN D DEFICIENCY DISEASE: ICD-10-CM

## 2020-01-27 DIAGNOSIS — E04.2 MULTINODULAR THYROID: ICD-10-CM

## 2020-01-27 DIAGNOSIS — K21.9 GASTROESOPHAGEAL REFLUX DISEASE, ESOPHAGITIS PRESENCE NOT SPECIFIED: ICD-10-CM

## 2020-01-27 PROCEDURE — 3074F PR MOST RECENT SYSTOLIC BLOOD PRESSURE < 130 MM HG: ICD-10-PCS | Mod: HCNC,CPTII,S$GLB, | Performed by: FAMILY MEDICINE

## 2020-01-27 PROCEDURE — 99999 PR PBB SHADOW E&M-EST. PATIENT-LVL III: ICD-10-PCS | Mod: PBBFAC,HCNC,, | Performed by: FAMILY MEDICINE

## 2020-01-27 PROCEDURE — 3074F SYST BP LT 130 MM HG: CPT | Mod: HCNC,CPTII,S$GLB, | Performed by: FAMILY MEDICINE

## 2020-01-27 PROCEDURE — 3079F PR MOST RECENT DIASTOLIC BLOOD PRESSURE 80-89 MM HG: ICD-10-PCS | Mod: HCNC,CPTII,S$GLB, | Performed by: FAMILY MEDICINE

## 2020-01-27 PROCEDURE — 3044F PR MOST RECENT HEMOGLOBIN A1C LEVEL <7.0%: ICD-10-PCS | Mod: HCNC,CPTII,S$GLB, | Performed by: FAMILY MEDICINE

## 2020-01-27 PROCEDURE — 3008F BODY MASS INDEX DOCD: CPT | Mod: HCNC,CPTII,S$GLB, | Performed by: FAMILY MEDICINE

## 2020-01-27 PROCEDURE — 99499 RISK ADDL DX/OHS AUDIT: ICD-10-PCS | Mod: HCNC,S$GLB,, | Performed by: FAMILY MEDICINE

## 2020-01-27 PROCEDURE — 3008F PR BODY MASS INDEX (BMI) DOCUMENTED: ICD-10-PCS | Mod: HCNC,CPTII,S$GLB, | Performed by: FAMILY MEDICINE

## 2020-01-27 PROCEDURE — 99215 PR OFFICE/OUTPT VISIT, EST, LEVL V, 40-54 MIN: ICD-10-PCS | Mod: HCNC,S$GLB,, | Performed by: FAMILY MEDICINE

## 2020-01-27 PROCEDURE — 99499 UNLISTED E&M SERVICE: CPT | Mod: HCNC,S$GLB,, | Performed by: FAMILY MEDICINE

## 2020-01-27 PROCEDURE — 3079F DIAST BP 80-89 MM HG: CPT | Mod: HCNC,CPTII,S$GLB, | Performed by: FAMILY MEDICINE

## 2020-01-27 PROCEDURE — 99215 OFFICE O/P EST HI 40 MIN: CPT | Mod: HCNC,S$GLB,, | Performed by: FAMILY MEDICINE

## 2020-01-27 PROCEDURE — 3044F HG A1C LEVEL LT 7.0%: CPT | Mod: HCNC,CPTII,S$GLB, | Performed by: FAMILY MEDICINE

## 2020-01-27 PROCEDURE — 99999 PR PBB SHADOW E&M-EST. PATIENT-LVL III: CPT | Mod: PBBFAC,HCNC,, | Performed by: FAMILY MEDICINE

## 2020-01-27 RX ORDER — SERTRALINE HYDROCHLORIDE 100 MG/1
200 TABLET, FILM COATED ORAL DAILY
Qty: 180 TABLET | Refills: 4 | Status: SHIPPED | OUTPATIENT
Start: 2020-01-27 | End: 2020-05-14 | Stop reason: SDUPTHER

## 2020-01-27 RX ORDER — METFORMIN HYDROCHLORIDE 500 MG/1
TABLET, EXTENDED RELEASE ORAL
Qty: 180 TABLET | Refills: 3 | Status: SHIPPED | OUTPATIENT
Start: 2020-01-27 | End: 2021-01-27

## 2020-01-27 RX ORDER — PANTOPRAZOLE SODIUM 40 MG/1
40 TABLET, DELAYED RELEASE ORAL DAILY
Qty: 90 TABLET | Refills: 3 | Status: SHIPPED | OUTPATIENT
Start: 2020-01-27 | End: 2021-01-27

## 2020-01-27 RX ORDER — ATORVASTATIN CALCIUM 40 MG/1
40 TABLET, FILM COATED ORAL DAILY
Qty: 90 TABLET | Refills: 3 | Status: SHIPPED | OUTPATIENT
Start: 2020-01-27 | End: 2021-01-27

## 2020-01-27 RX ORDER — TIZANIDINE 4 MG/1
TABLET ORAL
Qty: 360 TABLET | Refills: 3 | Status: SHIPPED | OUTPATIENT
Start: 2020-01-27 | End: 2021-01-27

## 2020-01-27 RX ORDER — LOSARTAN POTASSIUM AND HYDROCHLOROTHIAZIDE 25; 100 MG/1; MG/1
1 TABLET ORAL DAILY
Qty: 90 TABLET | Refills: 3 | Status: SHIPPED | OUTPATIENT
Start: 2020-01-27 | End: 2021-01-27

## 2020-01-27 NOTE — PROGRESS NOTES
Subjective:      Patient ID: Melita Romero is a 59 y.o. female.    Chief Complaint: Annual Exam    Disclaimer:  This note is prepared using voice recognition software and as such is likely to have errors and has not been proof read. Please contact me for questions.     Pt is here for  Prevention exam.   Lab Results       Component                Value               Date                       HGBA1C                   5.7 (H)             01/23/2020                 HGBA1C                   5.5                 11/08/2018                 HGBA1C                   5.5                 11/08/2018             Lab Results       Component                Value               Date                       CHOL                     166                 01/23/2020                 CHOL                     274 (H)             11/08/2018                 CHOL                     274 (H)             11/08/2018            Lab Results       Component                Value               Date                       LDLCALC                  89.0                01/23/2020                 LDLCALC                  171.8 (H)           11/08/2018                 LDLCALC                  171.8 (H)           11/08/2018              Wt Readings from Last 10 Encounters:  01/27/20 : 87.2 kg (192 lb 3.9 oz)  01/09/20 : 84.9 kg (187 lb 2.7 oz)  11/29/19 : 87.2 kg (192 lb 3.9 oz)  11/06/19 : 87.2 kg (192 lb 3.9 oz)  05/18/19 : 86.9 kg (191 lb 9.3 oz)  11/08/18 : 89.6 kg (197 lb 8.5 oz)  09/14/18 : 90.4 kg (199 lb 4.7 oz)  09/27/17 : 95.3 kg (210 lb)  09/11/17 : 95.6 kg (210 lb 12.2 oz)  08/04/17 : 94.7 kg (208 lb 12.4 oz)    The 10-year ASCVD risk score (Alexander GIL Wallace., et al., 2013) is: 12.1%    Values used to calculate the score:      Age: 59 years      Sex: Female      Is Non- : No      Diabetic: Yes      Tobacco smoker: Yes      Systolic Blood Pressure: 124 mmHg      Is BP treated: Yes      HDL Cholesterol: 49 mg/dL      Total  Cholesterol: 166 mg/dL    Formerly on xanax. Is off of it. Has managed being off it.  Needing to see a psychiatrist now.  With [facility].  Is currently on chronic opiate therapy advise that there may be other options especially in the setting since she has been on the sertraline at 200 mg regularly now.  Prior was on Effexor but had extensive amounts of sweating with this.    Also willing to be referred to smoking cessation group.  Really wants to try to quit because she plans on getting new teeth.        Now seeing Dr. Bhatti for chronic pain management.  Currently on hydrocodone 10/325 a    ON meds for cholesterol . Labs reviewed.  On Lipitor 40.    Blood pressure is well controlled.    Did do bone density which showed normal bone density levels.  Is still wanting to do her mammogram each and every year.    Did have to change from ranitidine to Pepcid due to recall.  Still having a lot of reflux issues would like to try PPI therapy.    Is on vitamin-D supplementation      Lab Results   Component Value Date    WBC 9.97 01/23/2020    HGB 14.9 01/23/2020    HCT 46.8 01/23/2020     01/23/2020    CHOL 166 01/23/2020    TRIG 140 01/23/2020    HDL 49 01/23/2020    ALT 20 01/23/2020    AST 25 01/23/2020     01/23/2020    K 3.9 01/23/2020     01/23/2020    CREATININE 0.8 01/23/2020    BUN 11 01/23/2020    CO2 27 01/23/2020    TSH 1.910 01/23/2020    HGBA1C 5.7 (H) 01/23/2020       DXA Bone Density Spine And Hip  Narrative: EXAMINATION:  DEXA BONE DENSITY SPINE HIP    CLINICAL HISTORY:  Other osteoporosis without current pathological fracture    TECHNIQUE:  DXA scanning was performed over the left hip and lumbar spine.  Review of the images confirms satisfactory positioning and technique.    COMPARISON:  October 29, 2013    FINDINGS:  The L1 to L4 vertebral bone mineral density is equal to 1.128 g/cm squared with a T score of -0.5.  There has been a 3.2% statistically significant change relative to the  prior study.    The left femoral neck bone mineral density is equal to 0.993 g/cm squared with a T score of -0.3.  There has been  a 5.0% statistically significant change relative to the prior study.  Impression: Normal    No evidence of significant bone density loss    Consider FDA approved medical therapies in postmenopausal women and men aged 50 years and older, based on the following:    *A hip or vertebral (clinical or morphometric) fracture  *T score less than or equal to -2.5 at the femoral neck or spine after appropriate evaluation to exclude secondary causes.  *Low bone mass -- also known as osteopenia (T score between -1.0 and -2.5 at the femoral neck or spine) and a 10 year probability of hip fracture greater than or equal to 3% or a 10 year probability of major osteoporosis-related fracture greater than or equal to 20% based on the [place]-adapted WHO algorithm.  *Clinicians judgment and/or patient preference may indicate treatment for people with 10 year fracture probabilities is above or below these levels.    Electronically signed by: Toni Koenig MD  Date:    01/24/2020  Time:    11:58        Review of Systems   Constitutional: Negative for activity change, appetite change, chills, fatigue and fever.   HENT: Positive for dental problem. Negative for ear pain and trouble swallowing.    Eyes: Negative for pain and visual disturbance.   Respiratory: Negative for cough and shortness of breath.    Cardiovascular: Negative for chest pain and leg swelling.   Gastrointestinal: Positive for abdominal pain. Negative for blood in stool, nausea and vomiting.        Heartburn   Endocrine: Negative for cold intolerance and heat intolerance.   Genitourinary: Negative for dysuria and frequency.   Musculoskeletal: Positive for arthralgias, back pain and neck pain. Negative for joint swelling and myalgias.   Skin: Negative for color change and rash.   Neurological: Negative for dizziness and headaches.  "  Psychiatric/Behavioral: Negative for behavioral problems and sleep disturbance. The patient is nervous/anxious.      Objective:     Vitals:    01/27/20 1440   BP: 124/82   Weight: 87.2 kg (192 lb 3.9 oz)   Height: 5' 3" (1.6 m)     Physical Exam   Constitutional: She is oriented to person, place, and time. She appears well-developed and well-nourished.   HENT:   Head: Normocephalic and atraumatic.   Right Ear: External ear normal.   Left Ear: External ear normal.   Mouth/Throat: Oropharynx is clear and moist and mucous membranes are normal. Abnormal dentition.   Eyes: Pupils are equal, round, and reactive to light. Conjunctivae and EOM are normal.   Neck: Normal range of motion. Neck supple. No thyromegaly present.   Cardiovascular: Normal rate and regular rhythm. Exam reveals no gallop and no friction rub.   No murmur heard.  Pulses:       Dorsalis pedis pulses are 2+ on the right side, and 2+ on the left side.   Pulmonary/Chest: Effort normal. No respiratory distress. She has no wheezes. She has no rales.   Abdominal: Soft. Bowel sounds are normal. She exhibits no distension. There is no tenderness. There is no rebound.   Musculoskeletal: Normal range of motion. She exhibits no edema.        Right foot: There is normal range of motion and no deformity.        Left foot: There is normal range of motion and no deformity.   Feet:   Right Foot:   Protective Sensation: 4 sites tested. 4 sites sensed.   Skin Integrity: Positive for warmth. Negative for ulcer, blister, skin breakdown, erythema, callus or dry skin.   Left Foot:   Protective Sensation: 4 sites tested. 4 sites sensed.   Skin Integrity: Positive for warmth. Negative for ulcer, blister, skin breakdown, erythema, callus or dry skin.   Lymphadenopathy:     She has no cervical adenopathy.   Neurological: She is alert and oriented to person, place, and time.   Skin: Skin is warm and dry. No rash noted.   Psychiatric: Her speech is normal and behavior is normal. " Judgment and thought content normal. Her mood appears anxious.   Vitals reviewed.    Assessment:     1. NALLELY (generalized anxiety disorder)    2. Moderate episode of recurrent major depressive disorder    3. Tobacco abuse    4. Type 2 diabetes mellitus with diabetic cataract, without long-term current use of insulin    5. Severe obesity (BMI 35.0-39.9) with comorbidity    6. Mixed hyperlipidemia    7. Gastroesophageal reflux disease, esophagitis presence not specified    8. Vitamin D deficiency disease    9. Essential hypertension    10. DDD (degenerative disc disease), lumbar    11. Multinodular thyroid      Plan:   Melita was seen today for annual exam.    Diagnoses and all orders for this visit:      NALLELY (generalized anxiety disorder)-refer to Psychiatry as may need medication adjustment.  In the setting of her opiate therapy would not recommend benzos.  Has tried Effexor in the past had excessive sweating currently on sertraline 200 mg still undergoing difficult time with family  -     Ambulatory referral to Psychiatry    Moderate episode of recurrent major depressive disorder-desires to see Psychiatry for adjustment of medications placed referral  -     Ambulatory referral to Psychiatry    Tobacco abuse-patient willing to quit at this time she has decided that she would like to quit in order to get improvement of her dentition.  Refer to smoking cessation.  Seems motivated  -     Ambulatory referral to Smoking Cessation Program    Type 2 diabetes mellitus with diabetic cataract, without long-term current use of insulin-stable this time with metformin.  Foot exam performed today.    Severe obesity (BMI 35.0-39.9) with comorbidity working on weight loss    Mixed hyperlipidemia improved with Lipitor 40 continue labs reviewed    Gastroesophageal reflux disease, esophagitis presence not specified not controlled with H2 blocker had to change from ranitidine to Pepcid.  Add PPI therapy.    Vitamin D deficiency disease  stable continue with supplementation    Essential hypertension-stable continue with medications    DDD (degenerative disc disease), lumbar followed by outside pain management doctor Davy    Multinodular thyroid thyroid labs stable reviewed today    Other orders  -     atorvastatin (LIPITOR) 40 MG tablet; Take 1 tablet (40 mg total) by mouth once daily.  -     losartan-hydrochlorothiazide 100-25 mg (HYZAAR) 100-25 mg per tablet; Take 1 tablet by mouth once daily.  -     sertraline (ZOLOFT) 100 MG tablet; Take 2 tablets (200 mg total) by mouth once daily.  -     pantoprazole (PROTONIX) 40 MG tablet; Take 1 tablet (40 mg total) by mouth once daily.      Time spent: 40 minutes in face to face discussion concerning diagnosis, prognosis, review of lab and test results, benefits of treatment as well as management of disease, counseling of patient and coordination of care between various health care providers . Greater than half the time spent was used for coordination of care and counseling of patient.         Follow up in about 1 year (around 1/27/2021) for physical with Dr MILNER.    Patient Instructions   Shingrix vaccine is the new shingles vaccine. Ask at pharmacy.  2 shots, not live, covered by insurance. 90 % effective against Shingles. Can start it now at age 50. Not doing the old Zostavax anymore. Even if you got zostavax, it is recommended to get the new shingles vaccine.       Also discussed new shingles vaccine would need to obtain through the pharmacy due to her drug plan of Medicare.

## 2020-01-28 PROBLEM — E66.01 SEVERE OBESITY (BMI 35.0-39.9) WITH COMORBIDITY: Status: RESOLVED | Noted: 2017-02-27 | Resolved: 2020-01-28

## 2020-02-26 ENCOUNTER — OFFICE VISIT (OUTPATIENT)
Dept: URGENT CARE | Facility: CLINIC | Age: 60
End: 2020-02-26
Payer: MEDICARE

## 2020-02-26 VITALS
TEMPERATURE: 97 F | WEIGHT: 194.75 LBS | HEART RATE: 76 BPM | HEIGHT: 63 IN | OXYGEN SATURATION: 96 % | RESPIRATION RATE: 18 BRPM | DIASTOLIC BLOOD PRESSURE: 69 MMHG | BODY MASS INDEX: 34.51 KG/M2 | SYSTOLIC BLOOD PRESSURE: 133 MMHG

## 2020-02-26 DIAGNOSIS — R07.89 CHEST TIGHTNESS: ICD-10-CM

## 2020-02-26 DIAGNOSIS — B96.89 BACTERIAL UPPER RESPIRATORY INFECTION: Primary | ICD-10-CM

## 2020-02-26 DIAGNOSIS — J06.9 BACTERIAL UPPER RESPIRATORY INFECTION: Primary | ICD-10-CM

## 2020-02-26 DIAGNOSIS — R05.9 COUGH: ICD-10-CM

## 2020-02-26 PROCEDURE — 99214 PR OFFICE/OUTPT VISIT, EST, LEVL IV, 30-39 MIN: ICD-10-PCS | Mod: S$GLB,,, | Performed by: NURSE PRACTITIONER

## 2020-02-26 PROCEDURE — 99214 OFFICE O/P EST MOD 30 MIN: CPT | Mod: S$GLB,,, | Performed by: NURSE PRACTITIONER

## 2020-02-26 RX ORDER — PROMETHAZINE HYDROCHLORIDE AND DEXTROMETHORPHAN HYDROBROMIDE 6.25; 15 MG/5ML; MG/5ML
5 SYRUP ORAL EVERY 6 HOURS PRN
Qty: 118 ML | Refills: 0 | Status: SHIPPED | OUTPATIENT
Start: 2020-02-26 | End: 2020-03-07

## 2020-02-26 RX ORDER — PREDNISONE 20 MG/1
20 TABLET ORAL DAILY
Qty: 5 TABLET | Refills: 0 | Status: SHIPPED | OUTPATIENT
Start: 2020-02-26 | End: 2020-03-02

## 2020-02-26 RX ORDER — AMOXICILLIN AND CLAVULANATE POTASSIUM 875; 125 MG/1; MG/1
1 TABLET, FILM COATED ORAL 2 TIMES DAILY
Qty: 14 TABLET | Refills: 0 | Status: SHIPPED | OUTPATIENT
Start: 2020-02-26 | End: 2020-03-04

## 2020-02-26 NOTE — PATIENT INSTRUCTIONS
· Avoid smoking.  · Rest and increase fluids.   · May apply warm compresses as needed.   · Saline nasal spray, saline irrigation (Neti pot), or steamy shower to loosen nasal congestion.  · Flonase or Nasacort to reduce inflammation in the sinus cavities.  · Take antibiotics exactly as prescribed. Make sure to complete the entire course of antibiotics even if you start feeling better. This will prevent recurrence of your infection and bacterial resistance.   · Do not drive, drink alcohol, or take any other sedating medications or substances while taking cough syrup.   · Follow up with your primary care provider or with ENT if not improved within a few days or sooner for any new or worsening symptoms.   · Go to the ER for any fever that does not improve with Tylenol/Ibuprofen, neck stiffness, rash, severe headache, vision changes, shortness of breath, chest pain, severe facial pain or swelling, or for any other new and concerning symptoms.

## 2020-02-26 NOTE — PROGRESS NOTES
"Subjective:       Patient ID: Melita Romero is a 59 y.o. female.    Vitals:  height is 5' 3" (1.6 m) and weight is 88.3 kg (194 lb 12.4 oz). Her temperature is 97.4 °F (36.3 °C). Her blood pressure is 133/69 and her pulse is 76. Her respiration is 18 and oxygen saturation is 96%.     Chief Complaint: Sinus Problem    Sinus Problem   This is a new problem. The current episode started 1 to 4 weeks ago (10 days ). The problem is unchanged. There has been no fever. Her pain is at a severity of 0/10. She is experiencing no pain. Associated symptoms include congestion, coughing and sinus pressure. Pertinent negatives include no chills, diaphoresis, ear pain, shortness of breath or sore throat. Treatments tried: OTC cough medication  The treatment provided mild relief.       Constitution: Negative for chills, sweating, fatigue and fever.   HENT: Positive for congestion, sinus pain and sinus pressure. Negative for ear pain, sore throat and voice change.    Neck: Negative for painful lymph nodes.   Eyes: Negative for eye redness.   Respiratory: Positive for cough. Negative for chest tightness, sputum production, bloody sputum, COPD, shortness of breath, stridor, wheezing and asthma.    Gastrointestinal: Negative for nausea and vomiting.   Musculoskeletal: Negative for muscle ache.   Skin: Negative for rash.   Allergic/Immunologic: Negative for seasonal allergies and asthma.   Hematologic/Lymphatic: Negative for swollen lymph nodes.       Objective:      Physical Exam   Constitutional: She is oriented to person, place, and time. She appears well-developed and well-nourished. She is cooperative.  Non-toxic appearance. She does not have a sickly appearance. She does not appear ill. No distress.   HENT:   Head: Normocephalic and atraumatic.   Right Ear: Hearing, tympanic membrane, external ear and ear canal normal.   Left Ear: Hearing, tympanic membrane, external ear and ear canal normal.   Nose: Mucosal edema present. No " rhinorrhea or nasal deformity. No epistaxis. Right sinus exhibits no maxillary sinus tenderness and no frontal sinus tenderness. Left sinus exhibits no maxillary sinus tenderness and no frontal sinus tenderness.   Mouth/Throat: Uvula is midline, oropharynx is clear and moist and mucous membranes are normal. No trismus in the jaw. Normal dentition. No uvula swelling. Cobblestoning present. No oropharyngeal exudate, posterior oropharyngeal edema or posterior oropharyngeal erythema.   Eyes: Conjunctivae and lids are normal. No scleral icterus.   Neck: Trachea normal, full passive range of motion without pain and phonation normal. Neck supple. No neck rigidity. No edema and no erythema present.   Cardiovascular: Normal rate, regular rhythm, normal heart sounds, intact distal pulses and normal pulses.   Pulmonary/Chest: Effort normal. No respiratory distress. She has no decreased breath sounds. She has rhonchi (clears with cough) in the right upper field and the left upper field.   Abdominal: Normal appearance.   Musculoskeletal: Normal range of motion. She exhibits no edema or deformity.   Neurological: She is alert and oriented to person, place, and time. She exhibits normal muscle tone. Coordination normal.   Skin: Skin is warm, dry, intact, not diaphoretic and not pale.   Psychiatric: She has a normal mood and affect. Her speech is normal and behavior is normal. Judgment and thought content normal. Cognition and memory are normal.   Nursing note and vitals reviewed.        Assessment:       1. Bacterial upper respiratory infection    2. Cough    3. Chest tightness        Plan:         Bacterial upper respiratory infection  -     amoxicillin-clavulanate 875-125mg (AUGMENTIN) 875-125 mg per tablet; Take 1 tablet by mouth 2 (two) times daily. for 7 days  Dispense: 14 tablet; Refill: 0  -     predniSONE (DELTASONE) 20 MG tablet; Take 1 tablet (20 mg total) by mouth once daily. for 5 days  Dispense: 5 tablet; Refill:  0    Cough  -     promethazine-dextromethorphan (PROMETHAZINE-DM) 6.25-15 mg/5 mL Syrp; Take 5 mLs by mouth every 6 (six) hours as needed (cough).  Dispense: 118 mL; Refill: 0  -     predniSONE (DELTASONE) 20 MG tablet; Take 1 tablet (20 mg total) by mouth once daily. for 5 days  Dispense: 5 tablet; Refill: 0    Chest tightness  -     predniSONE (DELTASONE) 20 MG tablet; Take 1 tablet (20 mg total) by mouth once daily. for 5 days  Dispense: 5 tablet; Refill: 0         · Avoid smoking.  · Rest and increase fluids.   · May apply warm compresses as needed.   · Saline nasal spray, saline irrigation (Neti pot), or steamy shower to loosen nasal congestion.  · Flonase or Nasacort to reduce inflammation in the sinus cavities.  · Take antibiotics exactly as prescribed. Make sure to complete the entire course of antibiotics even if you start feeling better. This will prevent recurrence of your infection and bacterial resistance.   · Do not drive, drink alcohol, or take any other sedating medications or substances while taking cough syrup.   · Follow up with your primary care provider or with ENT if not improved within a few days or sooner for any new or worsening symptoms.   · Go to the ER for any fever that does not improve with Tylenol/Ibuprofen, neck stiffness, rash, severe headache, vision changes, shortness of breath, chest pain, severe facial pain or swelling, or for any other new and concerning symptoms.

## 2020-02-28 ENCOUNTER — TELEPHONE (OUTPATIENT)
Dept: URGENT CARE | Facility: CLINIC | Age: 60
End: 2020-02-28

## 2020-02-28 NOTE — TELEPHONE ENCOUNTER
Called patient. Patient states she is still not feeling great yet. She is still dealing with a cough and just tired. Patient advised to continue plan of care as discussed with provider in clinic.

## 2020-03-02 ENCOUNTER — OFFICE VISIT (OUTPATIENT)
Dept: PSYCHIATRY | Facility: CLINIC | Age: 60
End: 2020-03-02
Payer: MEDICARE

## 2020-03-02 VITALS
BODY MASS INDEX: 34.8 KG/M2 | WEIGHT: 196.44 LBS | DIASTOLIC BLOOD PRESSURE: 90 MMHG | SYSTOLIC BLOOD PRESSURE: 126 MMHG | HEART RATE: 78 BPM

## 2020-03-02 DIAGNOSIS — F41.1 GENERALIZED ANXIETY DISORDER: ICD-10-CM

## 2020-03-02 DIAGNOSIS — F33.1 MODERATE EPISODE OF RECURRENT MAJOR DEPRESSIVE DISORDER: Primary | ICD-10-CM

## 2020-03-02 PROCEDURE — 90792 PSYCH DIAG EVAL W/MED SRVCS: CPT | Mod: HCNC,S$GLB,, | Performed by: NURSE PRACTITIONER

## 2020-03-02 PROCEDURE — 99499 RISK ADDL DX/OHS AUDIT: ICD-10-PCS | Mod: HCNC,S$GLB,, | Performed by: NURSE PRACTITIONER

## 2020-03-02 PROCEDURE — 99999 PR PBB SHADOW E&M-EST. PATIENT-LVL II: CPT | Mod: PBBFAC,HCNC,, | Performed by: NURSE PRACTITIONER

## 2020-03-02 PROCEDURE — 99999 PR PBB SHADOW E&M-EST. PATIENT-LVL II: ICD-10-PCS | Mod: PBBFAC,HCNC,, | Performed by: NURSE PRACTITIONER

## 2020-03-02 PROCEDURE — 99499 UNLISTED E&M SERVICE: CPT | Mod: HCNC,S$GLB,, | Performed by: NURSE PRACTITIONER

## 2020-03-02 PROCEDURE — 90792 PR PSYCHIATRIC DIAGNOSTIC EVALUATION W/MEDICAL SERVICES: ICD-10-PCS | Mod: HCNC,S$GLB,, | Performed by: NURSE PRACTITIONER

## 2020-03-02 RX ORDER — HYDROXYZINE PAMOATE 25 MG/1
25 CAPSULE ORAL EVERY 6 HOURS PRN
Qty: 30 CAPSULE | Refills: 0 | Status: SHIPPED | OUTPATIENT
Start: 2020-03-02 | End: 2020-04-01

## 2020-03-02 RX ORDER — BUPROPION HYDROCHLORIDE 150 MG/1
150 TABLET ORAL DAILY
Qty: 30 TABLET | Refills: 1 | Status: SHIPPED | OUTPATIENT
Start: 2020-03-02 | End: 2020-03-30
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
## 2020-03-02 NOTE — PATIENT INSTRUCTIONS
Depression  Depression is one of the most common mental health problems today. It is not just a state of unhappiness or sadness. It is a true disease. The cause seems to be related to a decrease in chemicals that transmit signals in the brain. Having a family history of depression, alcoholism, or suicide increases the risk. Chronic illness, chronic pain, migraine headaches and high emotional stress also increase the risk.  Depression is something we tend to recognize in others, but may have a hard time seeing in ourselves. It can show in many physical and emotional ways:  · Loss of appetite  · Over-eating  · Not being able to sleep  · Sleeping too much  · Tiredness not related to physical exertion  · Restlessness or irritability  · Slowness of movement or speech  · Feeling depressed or withdrawn  · Loss of interest in things you once enjoyed  · Trouble concentrating, poor memory, trouble making decisions  · Thoughts of harming or killing oneself, or thoughts that life is not worth living  · Low self-esteem  The treatment for depression may include both medicine and psychotherapy. Antidepressants can reduce suffering and can improve the ability to function during the depressed period. Therapy can offer emotional support and help you understand emotional factors that may be causing the depression.  Home care  · On-going care and support helps people manage this disease.  Find a healthcare provider and therapist who meet your needs. Seek help when you feel like you may be getting ill.  · Be kind to yourself. Make it a point to do things that you enjoy (gardening, walking in nature, going to a movie, etc.). Reward yourself for small successes.  · Take care of your physical body. Eat a balanced diet (low in saturated fat and high in fruits and vegetables). Exercise at least 3 times a week for 30 minutes. Even mild-moderate exercise (like brisk walking) can make you feel better.  · Avoid alcohol, which can make  depression worse.  · Take medicine as prescribed.  · Tell each of your healthcare providers about all of the prescription drugs, over-the-counter medicines, vitamins, and supplements you take. Certain supplements interact with medicines and can result in dangerous side effects. Ask your pharmacist when you have questions about drug interactions.  · Talk with your family and trusted friends about your feelings and thoughts. Ask them to help you recognize behavior changes early so you can get help and, if needed, medicine can be adjusted.  Follow-up care  Follow up with your healthcare provider, or as advised.  Call 911  Call 911 if you:  · Have suicidal thoughts, a suicide plan, and the means to carry out the plan  · Have trouble breathing  · Are very confused  · Feel very drowsy or have trouble awakening  · Faint or lose consciousness  · Have new chest pain that becomes more severe, lasts longer, or spreads into your shoulder, arm, neck, jaw or back  When to seek medical advice  Call your healthcare provider right away if any of these occur:  · Feeling extreme depression, fear, anxiety, or anger toward yourself or others  · Feeling out of control  · Feeling that you may try to harm yourself or another  · Hearing voices that others do not hear  · Seeing things that others do not see  · Cant sleep or eat for 3 days in a row  · Friends or family express concern over your behavior and ask you to seek help  Date Last Reviewed: 9/29/2015  © 5852-7670 [employer]. [address] [place] [address]. All rights reserved. This information is not intended as a substitute for professional medical care. Always follow your healthcare professional's instructions.          Understanding Post-Traumatic Stress Disorder (PTSD)  You may have post-traumatic stress disorder (PTSD) if youve been through a traumatic event and are having trouble dealing with it. Such events may include a car crash, rape, domestic  violence,  combat, or violent crime. While it is normal to have some anxiety after such an event, it usually goes away in time. But with PTSD, the anxiety is more intense and keeps coming back. And the trauma is relived through nightmares, intrusive memories, and flashbacks (vivid memories that seem real). The symptoms of PTSD can cause problems with relationships and make it hard to cope with daily life. But it can be treated. With help, you can feel better.    How does it feel?  Symptoms of PTSD often start within a few months of the event. Here are some common symptoms:  · You startle more easily, and feel anxious and on edge all the time. This can lead to sleep problems. It a can cause you to feel overwhelmed or become angry or upset more easily. Panic attacks (sudden, intense feelings of terror and a strong need to escape from wherever you are) can also occur.  · You relive the event through nightmares and flashbacks. During these, you may feel strong emotions and as though youre reliving the event all over again.  · You avoid people, places, or activities that remind you of the trauma. You may hold in your feelings and become emotionally numb. It may be hard to concentrate at work or school or to relax with friends. You may be afraid to let people get close to you.  Who does it affect?  Not everyone who survives a trauma will have PTSD. But many will. In fact, millions of people have the condition. PTSD can happen to anyone, but it most often develops after a person feels his or her or anothers life is threatened.  Youre at risk for PTSD if you have experienced or witnessed:  · A rape or sexual abuse  · A mugging or carjacking  · A car accident or plane crash  · A life-threatening illness  · War  · Domestic violence  · Childhood abuse  · Natural disasters such as earthquakes, floods, or hurricanes  · The sudden death of a loved one  Finding help  The first step is to talk to a trusted counselor or  healthcare provider. He or she can help you take the next step to treatment. This may involve therapy (also called counseling) and medication.  Are you having suicidal thoughts?  You may be feeling helpless, hopeless, and that you cant go on. You may even have thoughts of suicide. But help is available. There are ways to ease this pain and manage the problems in your life.  If you are thinking about harming or killing yourself, please tell your healthcare provider or someone you care about immediately or go to the nearest crisis walk-in center or emergency room.  You can also call, toll-free,  · 800-SUICIDE (613-777-8959)   · 605-260-PCBT ([phone])   Resources  · American Psychiatric Association  861.685.6996  www.healthyminds.org  · American Psychological Association  www.apa.org/helpcenter  · Anxiety and Depression Association of [place]  www.adaa.org  · Mental Health [place]  www.nmha.org  · National Center for PTSD  www.ptsd.va.gov  · National [place] of Mental Health  www.nimh.nih.gov/topics/ntclk-wpiq-zcmf.shtml  · National Suicide Prevention Lifeline  165.126.6196 (184-979-PIIF)  www.suicidepreventionlifeline.org  Date Last Reviewed: 2/1/2017  © 5665-1065 [employer]. [address] [place] [address], [place] [address]. All rights reserved. This information is not intended as a substitute for professional medical care. Always follow your healthcare professional's instructions.        Treating Anxiety Disorders with Therapy    If you have an anxiety disorder, you dont have to suffer anymore. Treatment is available. Therapy (also called counseling) is often a helpful treatment for anxiety disorders. With therapy, a specially trained professional (therapist) helps you face and learn to manage your anxiety. Therapy can be short-term or long-term depending on your needs. In some cases, medicine may also be prescribed with therapy. It may take time before you notice how much therapy is helping, but  stick with it. With therapy, you can feel better.  Cognitive behavioral therapy (CBT)  Cognitive behavioral therapy (CBT) teaches you to manage anxiety. It does this by helping you understand how you think and act when youre anxious. Research has shown CBT to be a very effective treatment for anxiety disorders. How CBT is run is almost like a class. It involves homework and activities to build skills that teach you to cope with anxiety step by step. It can be done in a group or one-on-one, and often takes place for a set number of sessions. CBT has two main parts:  · Cognitive therapy helps you identify the negative, irrational thoughts that occur with your anxiety. Youll learn to replace these with more positive, realistic thoughts.  · Behavioral therapy helps you change how you react to anxiety. Youll learn coping skills and methods for relaxing to help you better deal with anxiety.  Other forms of therapy  Other therapy methods may work better for you than CBT. Or, you may move from CBT to another form of therapy as your treatment needs change. This may mean meeting with a therapist by yourself or in a group. Therapy can also help you work through problems in your life, such as drug or alcohol dependence, that may be making your anxiety worse.  Getting better takes time  Therapy will help you feel better and teach you skills to help manage anxiety long term. But change doesnt happen right away. It takes a commitment from you. And treatment only works if you learn to face the causes of your anxiety. So, you might feel worse before you feel better. This can sometimes make it hard to stick with it. But remember: Therapy is a very effective treatment. The results will be well worth it.  Helping yourself  If anxiety is wearing you down, here are some things you can do to cope:  · Check with your doctor and rule out any physical problems that may be causing the anxiety symptoms.  · If an anxiety disorder is  diagnosed, seek mental healthcare. This is an illness and it can respond to treatment. Most types of anxiety disorders will respond to talk therapy and medicine.  · Educate yourself about anxiety disorders. Keep track of helpful online resources and books you can use during stressful periods.  · Try stress management techniques such as meditation.  · Consider online or in-person support groups.  · Dont fight your feelings. Anxiety feeds itself. The more you worry about it, the worse it gets. Instead, try to identify what might have triggered your anxiety. Then try to put this threat in perspective.  · Keep in mind that you cant control everything about a situation. Change what you can and let the rest take its course.  · Exercise -- its a great way to relieve tension and help your body feel relaxed.  · Examine your life for stress, and try to find ways to reduce it.  · Avoid caffeine and nicotine, which can make anxiety symptoms worse.  · Fight the temptation to turn to alcohol or unprescribed drugs for relief. They only make things worse in the long run.   Date Last Reviewed: 1/1/2017  © 1420-2475 [employer]. [address] [place] [address], [place] [address]. All rights reserved. This information is not intended as a substitute for professional medical care. Always follow your healthcare professional's instructions.

## 2020-03-26 ENCOUNTER — PATIENT MESSAGE (OUTPATIENT)
Dept: PSYCHIATRY | Facility: CLINIC | Age: 60
End: 2020-03-26

## 2020-03-26 ENCOUNTER — TELEPHONE (OUTPATIENT)
Dept: PSYCHIATRY | Facility: CLINIC | Age: 60
End: 2020-03-26

## 2020-03-30 ENCOUNTER — OFFICE VISIT (OUTPATIENT)
Dept: PSYCHIATRY | Facility: CLINIC | Age: 60
End: 2020-03-30
Payer: MEDICARE

## 2020-03-30 DIAGNOSIS — F51.01 PRIMARY INSOMNIA: ICD-10-CM

## 2020-03-30 DIAGNOSIS — F33.1 MODERATE EPISODE OF RECURRENT MAJOR DEPRESSIVE DISORDER: Primary | ICD-10-CM

## 2020-03-30 PROCEDURE — 99214 PR OFFICE/OUTPT VISIT, EST, LEVL IV, 30-39 MIN: ICD-10-PCS | Mod: HCNC,95,, | Performed by: NURSE PRACTITIONER

## 2020-03-30 PROCEDURE — 99214 OFFICE O/P EST MOD 30 MIN: CPT | Mod: HCNC,95,, | Performed by: NURSE PRACTITIONER

## 2020-03-30 RX ORDER — BUPROPION HYDROCHLORIDE 300 MG/1
300 TABLET ORAL DAILY
Qty: 30 TABLET | Refills: 1 | Status: SHIPPED | OUTPATIENT
Start: 2020-03-30 | End: 2020-05-14 | Stop reason: SDUPTHER

## 2020-03-30 RX ORDER — TRAZODONE HYDROCHLORIDE 100 MG/1
TABLET ORAL
Qty: 30 TABLET | Refills: 1 | Status: SHIPPED | OUTPATIENT
Start: 2020-03-30 | End: 2020-05-14 | Stop reason: SDUPTHER

## 2020-03-30 NOTE — PATIENT INSTRUCTIONS
Insomnia  Insomnia is repeated difficulty going to sleep or staying asleep, or both. Whether you have insomnia is not defined by a specific amount of sleep. Different people need different amounts of sleep, and you may need more or less sleep at different times of your life.  There are 3 major types of insomnia:  short-term, chronic, and other.  Short-term, or acute insomnia lasts less than 3 months.  The symptoms are temporary and can be linked directly to a stressor, such as the death of a loved one, financial problems, or a new physical problem.  Short-term insomnia stops when the stressor resolves or the person adapts to its presence.  Chronic insomnia occurs at least 3 times a week and lasts longer than 3 months.  Chronic insomnia can occur when either the cause of the sleeping problem is not clear, or the insomnia does not get better when the stressor is resolved. A number of other criteria are also used to make the diagnosis of chronic insomnia.   Other insomnia is the third type of insomnia-related sleep disorders.  This description applies to people who have problems getting to sleep or staying asleep, but do not meet all of the factors that describe either short-term or chronic insomnia.    Many things cause insomnia. Different people may have different causes. It can be from an underlying medical or psychological condition, or lifestyle. It can also be primary insomnia, which means no cause can be found.  Causes of insomnia include:  · Chronic medical problems- heart disease, gastrointestinal problems, hormonal changes, breathing problems  · Anxiety  · Stress  · Depression  · Pain  · Work schedule  · Sleep apnea  · Illegal drugs  · Certain medicines  Many different medidcines can affect your sleep, such as stimulants, caffeine, alcohol, some decongestants, and diet pills. Other medicines may include some types of blood pressure pills, steroids, asthma medicines, antihistamines, antidepressants,  seizure medicines and statins. Not all of these will affect your sleep, and they shouldnt be stopped without talking to your doctor.  Symptoms of insomnia can include:  · Lying awake for long periods at night before falling asleep  · Waking up several times during the night  · Waking up early in the morning and not being able to get back to sleep  · Feeling tired and not refreshed by sleep  · Not being able to function properly during the day and finding it hard to concentrate  · Irritability  · Tiredness and fatigue during the day  Home care  1. Review your medicines with your doctor or pharmacist to find out if they can cause insomnia. Not all medicines will affect your sleep, but they shouldn't be stopped without reviewing them with your doctor. There may be serious side effects and consequences from suddenly stopping your medicines. Not taking them may cause strokes, heart attacks, and many other problems.  2. Caffeine, smoking and alcohol also affect sleep. Limit your daily use and do not use these before bedtime. Alcohol may make you sleepy at first, but as its effects wear off, you may awaken a few hours later and have trouble returning to sleep.  3. Do not exercise, eat or drink large amounts of liquid within 2 hours of your bedtime.  4. Improve your sleep habits. Have a fixed bed and wake-up time. Try to keep noise, light and heat in your bedroom at a comfortable level. Try using earplugs or eyeshades if needed.   5. Avoid watching TV in bed.  6. If you do not fall asleep within 30 minutes, try to relax by reading or listening to soft music.  7. Limit daytime napping to one 30 minute period, early in the day.  8. Get regular exercise. Find other ways to lessen your stress level.  9. If a medicine was prescribed to help reset your sleep patterns, take it as directed. Sleeping pills are intended for short-term use, only. If taken for too long, the effect wears off while the risk of physical addiction and  psychological dependence increases.  Sleep diary  If the cause isnt obvious and it is not improving, try keeping a sleep diary for a couple of weeks. Include in it:  · The time you go to bed  · How long it takes to fall asleep  · How many times you wake up  · What time you wake up  · Your meal times and what you eat  · What time you drink alcohol  · Your exercise habits and times  Follow-up care  Follow up with your healthcare provider, or as advised. If X-rays or CT scans were done, you will be notified if there is a change in the reading, especially if it affects treatment.  Call 911  Call 911 if any of these occur:  · Trouble breathing  · Confusion or trouble waking  · Fainting or loss of consciousness  · Rapid heart rate  · New chest, arm, shoulder, neck or upper back pain  · Trouble with speech or vision, weakness of an arm or leg  · Trouble walking or talking, loss of balance, numbness or weakness in one side of your body, facial droop  When to seek medical advice  Call your healthcare provider right away if any of these occur:  · Extreme restlessness or irritability  · Confusion or hallucinations (seeing or hearing things that are not there)  · Anxiety, depression  · Several days without sleeping  Date Last Reviewed: 11/19/2015  © 5423-4521 [employer]. [address] [place] [address], [place] [address]. All rights reserved. This information is not intended as a substitute for professional medical care. Always follow your healthcare professional's instructions.

## 2020-03-30 NOTE — PROGRESS NOTES
"Outpatient Psychiatry Follow-Up Visit (MD/NP)      Disclaimer: Evaluation and treatment is based on information presented to date. Any new information may affect assessment and findings.             Clinical Status of Patient:  Outpatient (Ambulatory)    Chief Complaint:  Melita Romero is a 59 y.o. female who presents today for follow-up of depression, anxiety and insomnia.  Met with patient.      Interval History and Content of Current Session:  Interim Events/Subjective Report/Content of Current Session: patient has worsened since last visit.  states she did better the first couple of weeks with the Wellbutrin and she saw difference.  States she was motivated and started projects and was able to get out of bed.,  she felt her depression had improved.  Now she states she feels that it is not working as well as it did.     She states since the Covid 19 virus she has increased anxiety and panic attacks.  Had attack last night with hyperarousal symptoms, difficulty breathing.  States she is worrying " a lot about the virus"  and she has spent a lot of time watching the news and reading on her phone.  Verbalizes difficulty sleeping, states she goes to bed at 10:30 and she averages 3-4 hours a night without vistaril, with the vistaril, she has to take 2 to fall asleep and she does not like the way the vistaril makes her feel the next day.  " I feel kind of numb."  She states she does not take naps during the day, but if she takes the vistaril, she is still unable to fall asleep for several hours, then, she sleeps until 11:00.  States she had difficulty with sleep for " years."  Continues to endorses depression, denies crying episodes, verbalizes anhedonia, lack of motivation, and not wanting to talk to anyone but her grandchildren.    Continues to Smoke 1/2 ppd; has chronic cough, denies fever, or continued SOB    Depressive Disorder: REPORTS depressed mood, irritable mood, tired/fatigued, concentration problems. " "  Anxiety Disorder: panic attacks, hyperarousal symptoms, irritability, muscle tension, sleep problems, excessive worry.   Manic Disorder: DENIES none.   Psychotic Disorder: DENIES none.   Substance Use:  DENIES cigarettes.   Physical or Sexual Abuse: DENIES none.       Review of Systems   · PSYCHIATRIC: Pertinent items are noted in the narrative.    Past Medical, Family and Social History: The patient's past medical, family and social history have been reviewed and updated as appropriate within the electronic medical record - see encounter notes.    Compliance: yes    Side effects: None    Risk Parameters:  Patient reports no suicidal ideation  Patient reports no homicidal ideation  Patient reports no self-injurious behavior  Patient reports no violent behavior    Exam (detailed: at least 9 elements; comprehensive: all 15 elements)   Constitutional  Vitals:  Most recent vital signs, dated less than 90 days prior to this appointment, were reviewed.   Last 3 sets of Vitals    Vitals - 1 value per visit 1/27/2020 2/26/2020 3/2/2020   SYSTOLIC 124 133 126   DIASTOLIC 82 69 90   PULSE - 76 78   TEMPERATURE - 97.4 -   RESPIRATIONS - 18 -   SPO2 - 96 -   Weight (lb) 192.24 194.78 196.43   Weight (kg) 87.2 88.35 89.1   HEIGHT 5' 3" 5' 3" -   BODY MASS INDEX 34.05 34.5 34.8   VISIT REPORT - - -   Pain Score  4 - -   Some recent data might be hidden          General:  unremarkable, age appropriate, older than stated age, casually dressed, overweight     Musculoskeletal  Muscle Strength/Tone:  no spasicity, no rigidity, no cogwheeling, no flaccidity, no paratonia, no dyskinesia, no dystonia   Gait & Station:  non-ataxic     Psychiatric  Speech:  no latency; no press   Mood & Affect:  depressed  blunted   Thought Process:  normal and logical, goal-directed   Associations:  intact   Thought Content:  normal, no suicidality, no homicidality, delusions, or paranoia   Insight:  intact, has awareness of illness   Judgement: " behavior is adequate to circumstances, age appropriate   Orientation:  grossly intact   Memory: intact for content of interview   Language: grossly intact   Attention Span & Concentration:  able to focus   Fund of Knowledge:  intact and appropriate to age and level of education, familiar with aspects of current personal life     Assessment and Diagnosis   Status/Progress: Based on the examination today, the patient's problem(S) is/are inadequately controlled.  New problems have not been presented today.   Co-morbidities are complicating management of the primary condition.  There are no active rule-out diagnoses for this patient at this time.     General Impression:     Encounter Diagnoses   Name Primary?    Moderate episode of recurrent major depressive disorder Yes    Primary insomnia          Intervention/Counseling/Treatment Plan   · Medication Management: The risks and benefits of medication were discussed with the patient.  · Will increase Wellbutrin Xl 300 mg am; add trazodone 50 mg 1/2 - 1 tablet prn sleep; use vistaril for panic attack only  · Keep psychotherapy appointment as scheduled    Reviewed pathophysiology of depression, anxiety, insomnia.  Discussed rationale behind treatment. Reviewed treatment options, including medication and psychotherapy. Reviewed pharmacology including onset of action, dosing, side effects, adverse reactions and duration of therapy (6-12 months).  Discussed the need to stay on medication if it is effective and not discontinue after a few weeks due to the probability of relapse.    Pt expressed appreciation for the visit today and did not have further question at this time though pt  was still informed to:     Call / Walk In if problems.    Call Report Side Effects to Psych MD     Encouraged to follow up with primary care / Gen Med MD for continued monitoring of general health and wellness.    Call 911  Or go to ER if Acute Concerns (especially if any thoughts of harm to  self or other).     Understanding was expressed; and no further concerns nor questions were raised at this time.        Return to Clinic: 2 weeks

## 2020-04-30 ENCOUNTER — PATIENT MESSAGE (OUTPATIENT)
Dept: INTERNAL MEDICINE | Facility: CLINIC | Age: 60
End: 2020-04-30

## 2020-05-05 ENCOUNTER — OFFICE VISIT (OUTPATIENT)
Dept: PSYCHIATRY | Facility: CLINIC | Age: 60
End: 2020-05-05
Payer: MEDICARE

## 2020-05-05 DIAGNOSIS — F33.1 MODERATE EPISODE OF RECURRENT MAJOR DEPRESSIVE DISORDER: Primary | ICD-10-CM

## 2020-05-05 DIAGNOSIS — F41.1 GENERALIZED ANXIETY DISORDER: ICD-10-CM

## 2020-05-05 PROCEDURE — 90791 PR PSYCHIATRIC DIAGNOSTIC EVALUATION: ICD-10-PCS | Mod: HCNC,95,, | Performed by: SOCIAL WORKER

## 2020-05-05 PROCEDURE — 90791 PSYCH DIAGNOSTIC EVALUATION: CPT | Mod: HCNC,95,, | Performed by: SOCIAL WORKER

## 2020-05-05 NOTE — PROGRESS NOTES
"  Psychiatry Initial Visit (PhD/LCSW)    Diagnostic Interview - CPT 71205, Virtual visit with synchronous audio/video, Location of patient: home    Each patient provided medical services by telemedicine is: (1) informed of the relationship between the provider and patient and the respective role of any other health care provider with respect to management of the patient; and (2) notified that he or she may decline to receive medical services by telemedicine and may withdraw from such care at any time.    Date: 2020    Site: [place]  Referral source: DARWIN Zaidi  Primary care provider: Janett Mccauley MD  Clinical status of patient: Outpatient  MRN: [id number]    Melita Romero, a 59 y.o. female, for initial evaluation visit. Met with patient.      Subjective:     Chief complaint/reason for encounter: depression and anxiety    History of present illness: "I've been like this for years, probably since my late 20s early 30s." Was having crying spells every day, didn't want to see anyone or get out of bed. Hsb of 37 years  of brain cancer in 2017, and she became more depressed, couldn't cope or handle anything. Hsb had been disabled for over 10 years due to back problems; he  about 3 weeks after cancer diagnosis. Denies hx of self harm, SI, plan or intent. Has also had anxiety for many years. Worries constantly, has a hx of insomnia. Hx of panic symptoms (SOB, chest pain). Has not had a panic attack in 6-12 months. Rates her anxiety 3-4/10. She is feeling better and sleeping better since she began seeing Cara Ford NP for medication management. Rates her depression 3-4/10; prior to seeking treatment it was 10/10. Lives alone. Hsb and DIL live next door. Has one dtr who lives several miles away. She also has 6 grandchildren. DDD and arthritis in her hips make it difficult for her to get as much exercise as she needs. Has had PT in the past. Denies having any hobbies or much social " "interaction other than her family.  States that her goal is to "get a life." She has spent her whole life taking care of others and wants to learn to put herself first without feeling guilty.     Symptoms:   · Depression: depressed mood, diminished interest, insomnia, fatigue, worthlessness/guilt, poor concentration, tearfulness and social isolation  · Anxiety: decreased memory, excessive anxiety/worry, restlessness/keyed up, irritability, muscle tension and panic attacks  · Substance abuse: denied  · Cognitive functioning: denied  · Debra: none noted  · Psychosis: none noted      Psychiatric history: has participated in counseling/psychotherapy on an outpatient basis in the past and currently under psychiatric care    Medical history:   Patient Active Problem List   Diagnosis    Moderate episode of recurrent major depressive disorder    Mixed hyperlipidemia    GERD (gastroesophageal reflux disease)    DDD (degenerative disc disease), lumbar    Essential hypertension    Vitamin D deficiency disease    Smoker    NALLELY (generalized anxiety disorder)    Left ventricular diastolic dysfunction with preserved systolic function    Complete tear of right rotator cuff    Type 2 diabetes mellitus with diabetic cataract, without long-term current use of insulin    Lumbar spondylosis    Multinodular thyroid    Diverticulosis    History of colon polyps    Colon cancer screening    Special screening for malignant neoplasms, colon    Colon polyps    Diverticulosis of colon    Internal hemorrhoids    Primary insomnia        Family history of psychiatric illness: maternal grandmother--anxiety; paternal aunt--attempted suicide    Social history (marriage, employment, etc.): Raised in [place], [place]. Father was the  of a grocery store, and mother stayed home. Pt has a younger brother. States she had no problems as a child or with her parents. She graduated high school;  at age 19 a month " after graduating and gave birth to her dtr that October. Father  of lung cancer 13 years ago. Mother has DM and arthritis but is doing well for her age (78).    Trauma/Abuse history: Denies      Substance use:  Alcohol: special occasions  Drugs: none  Tobacco: smoker for 30+ years; 1/2 pack daily  Caffeine: 1-2 cups of coffee daily; 1-2 regular coca-davion daily    Current medications and drug reactions (include OTC, herbal):   Outpatient Encounter Medications as of 2020   Medication Sig Dispense Refill    aspirin 81 mg Tab Take 1 tablet by mouth Daily.      atorvastatin (LIPITOR) 40 MG tablet Take 1 tablet (40 mg total) by mouth once daily. 90 tablet 3    buPROPion (WELLBUTRIN XL) 300 MG 24 hr tablet Take 1 tablet (300 mg total) by mouth once daily. 30 tablet 1    cholecalciferol, vitamin D3, 2,000 unit Tab Take 1 tablet by mouth Daily.      famotidine (PEPCID) 10 MG tablet Take 10 mg by mouth 2 (two) times daily.      hydrocodone-acetaminophen 10-325mg (NORCO)  mg Tab Take 1 tablet by mouth every 8 (eight) hours as needed. 75 tablet 0    losartan-hydrochlorothiazide 100-25 mg (HYZAAR) 100-25 mg per tablet Take 1 tablet by mouth once daily. 90 tablet 3    metFORMIN (GLUCOPHAGE-XR) 500 MG 24 hr tablet TAKE 1 TABLET BY MOUTH TWICE A DAY WITH MEALS 180 tablet 3    multivitamin-Ca-iron-minerals (ESSENTIAL DAILY) 18-0.4 mg Tab Take 1 tablet by mouth Daily.      pantoprazole (PROTONIX) 40 MG tablet Take 1 tablet (40 mg total) by mouth once daily. 90 tablet 3    sertraline (ZOLOFT) 100 MG tablet Take 2 tablets (200 mg total) by mouth once daily. 180 tablet 4    tiZANidine (ZANAFLEX) 4 MG tablet TAKE 1 TABLET BY MOUTH EVERY 6 HOURS AS NEEDED 360 tablet 3    traZODone (DESYREL) 100 MG tablet Take 1/2 - 1 tablet at night, as needed for sleep. 30 tablet 1     No facility-administered encounter medications on file as of 2020.           Objective - Current Evaluation:     Mental Status  Evaluation  Appearance: age appropriate, obese  Behavior: normal, cooperative  Speech: normal tone, normal rate, normal pitch, normal volume  Mood: anxious, depressed  Affect: congruent and appropriate, blunted  Thought Process: normal and logical  Thought Content: normal, no suicidality, no homicidality, delusions, or paranoia  Sensorium: grossly intact  Cognition: grossly intact  Insight: good  Judgment: adequate to circumstances    Strengths and liabilities: Strength: Patient accepts guidance/feedback, Strength: Patient is motivated for change, Strength: Patient has positive support network, Strength: Patient has reasonable judgment and Liability: Patient lacks coping skills      Diagnostic Impression - Plan:     1. Moderate episode of recurrent major depressive disorder    2. Generalized anxiety disorder        Plan:individual psychotherapy and medication management by physician    Return to Clinic: 2 weeks    Length of Service (minutes): 45      ARTI Vargas, [facility]  Outpatient Psychiatry  [facility]  [phone]

## 2020-05-06 ENCOUNTER — HOSPITAL ENCOUNTER (OUTPATIENT)
Dept: RADIOLOGY | Facility: HOSPITAL | Age: 60
Discharge: HOME | End: 2020-05-06
Attending: NURSE PRACTITIONER
Payer: MEDICARE

## 2020-05-06 DIAGNOSIS — M47.27 OTHER SPONDYLOSIS WITH RADICULOPATHY, LUMBOSACRAL REGION: ICD-10-CM

## 2020-05-06 DIAGNOSIS — F11.20 OPIOID DEPENDENCE: ICD-10-CM

## 2020-05-06 PROCEDURE — 72100 X-RAY EXAM L-S SPINE 2/3 VWS: CPT | Mod: TC,HCNC,FY,PO

## 2020-05-06 PROCEDURE — 72100 X-RAY EXAM L-S SPINE 2/3 VWS: CPT | Mod: 26,HCNC,, | Performed by: RADIOLOGY

## 2020-05-06 PROCEDURE — 72100 XR LUMBAR SPINE 2 OR 3 VIEWS: ICD-10-PCS | Mod: 26,HCNC,, | Performed by: RADIOLOGY

## 2020-05-14 ENCOUNTER — OFFICE VISIT (OUTPATIENT)
Dept: PSYCHIATRY | Facility: CLINIC | Age: 60
End: 2020-05-14
Payer: MEDICARE

## 2020-05-14 DIAGNOSIS — F51.01 PRIMARY INSOMNIA: Primary | ICD-10-CM

## 2020-05-14 DIAGNOSIS — F33.1 MODERATE EPISODE OF RECURRENT MAJOR DEPRESSIVE DISORDER: ICD-10-CM

## 2020-05-14 DIAGNOSIS — F41.1 GAD (GENERALIZED ANXIETY DISORDER): ICD-10-CM

## 2020-05-14 PROCEDURE — 99214 PR OFFICE/OUTPT VISIT, EST, LEVL IV, 30-39 MIN: ICD-10-PCS | Mod: HCNC,95,, | Performed by: NURSE PRACTITIONER

## 2020-05-14 PROCEDURE — 99499 UNLISTED E&M SERVICE: CPT | Mod: HCNC,95,, | Performed by: NURSE PRACTITIONER

## 2020-05-14 PROCEDURE — 99499 RISK ADDL DX/OHS AUDIT: ICD-10-PCS | Mod: HCNC,95,, | Performed by: NURSE PRACTITIONER

## 2020-05-14 PROCEDURE — 99214 OFFICE O/P EST MOD 30 MIN: CPT | Mod: HCNC,95,, | Performed by: NURSE PRACTITIONER

## 2020-05-14 RX ORDER — TRAZODONE HYDROCHLORIDE 100 MG/1
TABLET ORAL
Qty: 90 TABLET | Refills: 0 | Status: SHIPPED | OUTPATIENT
Start: 2020-05-14 | End: 2020-08-21 | Stop reason: ALTCHOICE

## 2020-05-14 RX ORDER — SERTRALINE HYDROCHLORIDE 100 MG/1
200 TABLET, FILM COATED ORAL DAILY
Qty: 180 TABLET | Refills: 0 | Status: SHIPPED | OUTPATIENT
Start: 2020-05-14 | End: 2020-08-21 | Stop reason: SDUPTHER

## 2020-05-14 RX ORDER — BUPROPION HYDROCHLORIDE 300 MG/1
300 TABLET ORAL DAILY
Qty: 90 TABLET | Refills: 0 | Status: SHIPPED | OUTPATIENT
Start: 2020-05-14 | End: 2020-08-21 | Stop reason: SDUPTHER

## 2020-05-14 NOTE — PATIENT INSTRUCTIONS
Depression  Depression is one of the most common mental health problems today. It is not just a state of unhappiness or sadness. It is a true disease. The cause seems to be related to a decrease in chemicals that transmit signals in the brain. Having a family history of depression, alcoholism, or suicide increases the risk. Chronic illness, chronic pain, migraine headaches and high emotional stress also increase the risk.  Depression is something we tend to recognize in others, but may have a hard time seeing in ourselves. It can show in many physical and emotional ways:  · Loss of appetite  · Over-eating  · Not being able to sleep  · Sleeping too much  · Tiredness not related to physical exertion  · Restlessness or irritability  · Slowness of movement or speech  · Feeling depressed or withdrawn  · Loss of interest in things you once enjoyed  · Trouble concentrating, poor memory, trouble making decisions  · Thoughts of harming or killing oneself, or thoughts that life is not worth living  · Low self-esteem  The treatment for depression may include both medicine and psychotherapy. Antidepressants can reduce suffering and can improve the ability to function during the depressed period. Therapy can offer emotional support and help you understand emotional factors that may be causing the depression.  Home care  · On-going care and support helps people manage this disease.  Find a healthcare provider and therapist who meet your needs. Seek help when you feel like you may be getting ill.  · Be kind to yourself. Make it a point to do things that you enjoy (gardening, walking in nature, going to a movie, etc.). Reward yourself for small successes.  · Take care of your physical body. Eat a balanced diet (low in saturated fat and high in fruits and vegetables). Exercise at least 3 times a week for 30 minutes. Even mild-moderate exercise (like brisk walking) can make you feel better.  · Avoid alcohol, which can make  depression worse.  · Take medicine as prescribed.  · Tell each of your healthcare providers about all of the prescription drugs, over-the-counter medicines, vitamins, and supplements you take. Certain supplements interact with medicines and can result in dangerous side effects. Ask your pharmacist when you have questions about drug interactions.  · Talk with your family and trusted friends about your feelings and thoughts. Ask them to help you recognize behavior changes early so you can get help and, if needed, medicine can be adjusted.  Follow-up care  Follow up with your healthcare provider, or as advised.  Call 911  Call 911 if you:  · Have suicidal thoughts, a suicide plan, and the means to carry out the plan  · Have trouble breathing  · Are very confused  · Feel very drowsy or have trouble awakening  · Faint or lose consciousness  · Have new chest pain that becomes more severe, lasts longer, or spreads into your shoulder, arm, neck, jaw or back  When to seek medical advice  Call your healthcare provider right away if any of these occur:  · Feeling extreme depression, fear, anxiety, or anger toward yourself or others  · Feeling out of control  · Feeling that you may try to harm yourself or another  · Hearing voices that others do not hear  · Seeing things that others do not see  · Cant sleep or eat for 3 days in a row  · Friends or family express concern over your behavior and ask you to seek help  Date Last Reviewed: 9/29/2015  © 7387-4138 [employer]. [address] [place] [address], [place] [address]. All rights reserved. This information is not intended as a substitute for professional medical care. Always follow your healthcare professional's instructions.

## 2020-05-14 NOTE — PROGRESS NOTES
"Outpatient Psychiatry Follow-Up Visit (MD/NP)      Disclaimer: Evaluation and treatment is based on information presented to date. Any new information may affect assessment and findings.     The patient location is: home    Visit type: audiovisual  Total time spent with patient: 16 min.  Patient was early  Ejach patient to whom he or she provides medical services by telemedicine is:  (1) informed of the relationship between the physician and patient and the respective role of any other health care provider with respect to management of the patient; and (2) notified that he or she may decline to receive medical services by telemedicine and may withdraw from such care at any time.                Clinical Status of Patient:  Outpatient (Ambulatory)    Chief Complaint:  Melita Romero is a 59 y.o. female who presents today for follow-up of depression and anxiety.  Met with patient.      Interval History and Content of Current Session:  Interim Events/Subjective Report/Content of Current Session: states he is feeling " so much better, total change"  States she is doing much better with her depression and anxiety; however, her sleep has not improved.  Sleeps with medicine, 7-8 hours a night.  She has " to take the whole 100 mg tablet."  States if she does not take the medication, she has difficulty falling asleep, tosses and turns; frequent wakening when she does fall asleep.  This has been going on for several months. Discussed a referral for the sleep clinic to rule out any medical issues.      Depressive Disorder: DENIES none.   Anxiety Disorder: hyperarousal symptoms.   Manic Disorder: DENIES none.   Psychotic Disorder: DENIES none.   Substance Use:  DENIES none.   Physical or Sexual Abuse: DENIES none.       Review of Systems   · PSYCHIATRIC: Pertinant items are noted in the narrative.    Past Medical, Family and Social History: The patient's past medical, family and social history have been reviewed and updated " "as appropriate within the electronic medical record - see encounter notes.    Compliance: yes    Side effects: None    Risk Parameters:  Patient reports no suicidal ideation  Patient reports no homicidal ideation  Patient reports no self-injurious behavior  Patient reports no violent behavior    Exam (detailed: at least 9 elements; comprehensive: all 15 elements)   Constitutional  Vitals:  Most recent vital signs, dated greater than 90 days prior to this appointment, were reviewed.   Last 3 sets of Vitals    Vitals - 1 value per visit 1/27/2020 2/26/2020 3/2/2020   SYSTOLIC 124 133 126   DIASTOLIC 82 69 90   PULSE - 76 78   TEMPERATURE - 97.4 -   RESPIRATIONS - 18 -   SPO2 - 96 -   Weight (lb) 192.24 194.78 196.43   Weight (kg) 87.2 88.35 89.1   HEIGHT 5' 3" 5' 3" -   BODY MASS INDEX 34.05 34.5 34.8   VISIT REPORT - - -   Pain Score  4 - -   Some recent data might be hidden          General:  unremarkable, age appropriate, casually dressed, neatly groomed     Musculoskeletal  Muscle Strength/Tone:  not examined   Gait & Station:  non-ataxic     Psychiatric  Speech:  no latency; no press   Mood & Affect:  happy  congruent and appropriate   Thought Process:  normal and logical   Associations:  intact   Thought Content:  normal, no suicidality, no homicidality, delusions, or paranoia   Insight:  intact, has awareness of illness   Judgement: behavior is adequate to circumstances, age appropriate   Orientation:  grossly intact   Memory: intact for content of interview   Language: grossly intact   Attention Span & Concentration:  able to focus   Fund of Knowledge:  intact and appropriate to age and level of education, familiar with aspects of current personal life     Assessment and Diagnosis   Status/Progress: Based on the examination today, the patient's problem(s) is/are adequately but not ideally controlled.  New problems have not been presented today.        General Impression:     Encounter Diagnoses   Name Primary? "    Primary insomnia Yes    Moderate episode of recurrent major depressive disorder     NALLELY (generalized anxiety disorder)          Intervention/Counseling/Treatment Plan   · Medication Management: The risks and benefits of medication were discussed with the patient.  · Continue current medications  · Refer to sleep clinic to rule out possible medical    Reviewed pathophysiology of depression and anxiety. Discussed rationale behind treatment. Reviewed treatment options, including medication and psychotherapy. Reviewed pharmacology including onset of action, dosing, side effects, adverse reactions and duration of therapy (6-12 months).  Discussed the need to stay on medication if it is effective and not discontinue after a few weeks due to the probability of relapse.    Pt expressed appreciation for the visit today and did not have further question at this time though pt  was still informed to:     Call / Walk In if problems.    Call Report Side Effects to Psyc MD     Encouraged to follow up with primary care / Gen Med MD for continued monitoring of general health and wellness.    Call 911  Or go to ER if Acute Concerns (especially if any thoughts of harm to self or other).     Understanding was expressed; and no further concerns nor questions were raised at this time.        Return to Clinic: 3 months

## 2020-06-10 DIAGNOSIS — F51.01 PRIMARY INSOMNIA: Primary | ICD-10-CM

## 2020-07-02 ENCOUNTER — OFFICE VISIT (OUTPATIENT)
Dept: SLEEP MEDICINE | Facility: CLINIC | Age: 60
End: 2020-07-02
Payer: MEDICARE

## 2020-07-02 DIAGNOSIS — F17.200 SMOKER: ICD-10-CM

## 2020-07-02 DIAGNOSIS — Z87.891 PERSONAL HISTORY OF NICOTINE DEPENDENCE: ICD-10-CM

## 2020-07-02 DIAGNOSIS — E78.2 MIXED HYPERLIPIDEMIA: ICD-10-CM

## 2020-07-02 DIAGNOSIS — F41.1 GAD (GENERALIZED ANXIETY DISORDER): ICD-10-CM

## 2020-07-02 DIAGNOSIS — F33.1 MODERATE EPISODE OF RECURRENT MAJOR DEPRESSIVE DISORDER: ICD-10-CM

## 2020-07-02 DIAGNOSIS — E11.36 TYPE 2 DIABETES MELLITUS WITH DIABETIC CATARACT, WITHOUT LONG-TERM CURRENT USE OF INSULIN: Primary | ICD-10-CM

## 2020-07-02 DIAGNOSIS — F51.01 PRIMARY INSOMNIA: ICD-10-CM

## 2020-07-02 DIAGNOSIS — I10 ESSENTIAL HYPERTENSION: ICD-10-CM

## 2020-07-02 DIAGNOSIS — Z12.2 ENCOUNTER FOR SCREENING FOR MALIGNANT NEOPLASM OF RESPIRATORY ORGANS: ICD-10-CM

## 2020-07-02 DIAGNOSIS — G47.33 OSA (OBSTRUCTIVE SLEEP APNEA): ICD-10-CM

## 2020-07-02 PROCEDURE — 3044F PR MOST RECENT HEMOGLOBIN A1C LEVEL <7.0%: ICD-10-PCS | Mod: HCNC,CPTII,95, | Performed by: INTERNAL MEDICINE

## 2020-07-02 PROCEDURE — 99204 PR OFFICE/OUTPT VISIT, NEW, LEVL IV, 45-59 MIN: ICD-10-PCS | Mod: HCNC,95,, | Performed by: INTERNAL MEDICINE

## 2020-07-02 PROCEDURE — 99204 OFFICE O/P NEW MOD 45 MIN: CPT | Mod: HCNC,95,, | Performed by: INTERNAL MEDICINE

## 2020-07-02 PROCEDURE — 3044F HG A1C LEVEL LT 7.0%: CPT | Mod: HCNC,CPTII,95, | Performed by: INTERNAL MEDICINE

## 2020-07-02 NOTE — PROGRESS NOTES
The patient location is: Home: [place]  The chief complaint leading to consultation is: Sleep problem    Visit type: audiovisual    Face to Face time with patient: 25  20 minutes of total time spent on the encounter, which includes face to face time and non-face to face time preparing to see the patient (eg, review of tests), Obtaining and/or reviewing separately obtained history, Documenting clinical information in the electronic or other health record, Independently interpreting results (not separately reported) and communicating results to the patient/family/caregiver, or Care coordination (not separately reported).         Each patient to whom he or she provides medical services by telemedicine is:  (1) informed of the relationship between the physician and patient and the respective role of any other health care provider with respect to management of the patient; and (2) notified that he or she may decline to receive medical services by telemedicine and may withdraw from such care at any time.    Notes:                                              Pulmonary Outpatient  Visit     Subjective:       Patient ID: Melita Romero is a 59 y.o. female.    Patient Active Problem List   Diagnosis    Moderate episode of recurrent major depressive disorder    Mixed hyperlipidemia    GERD (gastroesophageal reflux disease)    DDD (degenerative disc disease), lumbar    Essential hypertension    Vitamin D deficiency disease    Smoker    NALLELY (generalized anxiety disorder)    Left ventricular diastolic dysfunction with preserved systolic function    Complete tear of right rotator cuff    Type 2 diabetes mellitus with diabetic cataract, without long-term current use of insulin    Lumbar spondylosis    Multinodular thyroid    Diverticulosis    History of colon polyps    Colon cancer screening    Special screening for malignant neoplasms, colon    Colon polyps    Diverticulosis of colon    Internal  hemorrhoids    Primary insomnia      Immunization History   Administered Date(s) Administered    Influenza - Quadrivalent 10/13/2014, 10/12/2015, 10/31/2016    Influenza - Quadrivalent - PF (6 months and older) 10/12/2015, 10/26/2017, 11/06/2018, 11/06/2019    Influenza Split 10/18/2010, 11/15/2011, 10/10/2012, 10/16/2013    Pneumococcal Polysaccharide - 23 Valent 07/14/2016    Tdap 07/10/2012      Social History     Tobacco Use   Smoking Status Current Every Day Smoker    Packs/day: 0.50    Years: 25.00    Pack years: 12.50   Smokeless Tobacco Never Used        EPWORTH SLEEPINESS SCALE 7/2/2020   Sitting and reading 0   Watching TV 0   Sitting, inactive in a public place (e.g. a theatre or a meeting) 0   As a passenger in a car for an hour without a break 0   Lying down to rest in the afternoon when circumstances permit 0   Sitting and talking to someone 0   Sitting quietly after a lunch without alcohol 0   In a car, while stopped for a few minutes in traffic 0   Total score 0        WINSTON was 14     Chief Complaint: Sleeping Problem and Insomnia      Melita Romero is 59 y.o.   Referred by TATA DUFF  Was recommended to see me  Meds: MEDS: TRAZODONE, WELLBUTRIN, VISTARIL  Unsure of snoring  Bedtime:varies: 11-1 am  Poor quality sleep  Wake time: varies 6am-9 am  SOL: 2 hrs: Toss and turn  Legs occasionally hurt  No Tv in bed.  Naps: NONE  Retired: Disability: retail  Smoking 1/2 ppd: 25 years    STOP - BANG Questionnaire:     1. Snoring : Do you snore loudly ?    No    2. Tired : Do you often feel tired, fatigued, or sleepy during daytime? Yes    3. Observed: Has anyone observed you stop breathing during your sleep?   No     4. Blood pressure : Do you have or are you being treated for high blood pressure?   Yes    5. BMI :BMI more than 35 kg/m2?      No  BMI 34.05 kg/m²    6. Age : Age over 50 yr old?   Yes    7. Neck circumference: Neck circumference greater than 40 cm?   N/A    8. Gender: Gender  male?   No      Intermediate risk of JAMIR: Yes 3     References:   STOP Questionnaire   A Tool to Screen Patients for Obstructive Sleep Apnea: MARIAH DodgeC.P.C., VALERIANO Acuna.B.B.S., Jo Ann Lr M.D.,Kim Gamboa, Ph.D., ERNA HellerB.B.S.,_ Berenice Mi.,_ Vicky Hopkins M.D., RODRIGUE HolderR.C.P.C.; Anesthesiology 2008; 108:812-21 Copyright © 2008, the American Society of Anesthesiologists, Inc. [employer]      Please rate the CURRENT (i.e. LAST 2 WEEKS) SEVERITY of your insomnia problem(s).  1. Difficulty falling asleep: Moderate  2. Difficulty staying asleep: Moderate  3. Problem waking up too early: Moderate  4. How SATISFIED/DISSATISFIED are you with your CURRENT sleep pattern?: Dissatisfied  5. How NOTICEABLE to others do you think your sleep problem is in terms of impairing the quality of your life?: A little  6.  How WORRIED/DISTRESSED are you about your current sleep problem?: A little  7. To what extent do you consider your sleep problem to INTERFERE with your daily functioning (e.g. daytime fatigue, mood, ability to function at work/daily chores, concentration, memory, mood, etc.) CURRENTLY?: Much  Total Score: 14  Interpretation: Subthreshold insomnia          Review of Systems   Respiratory: Positive for apnea.    Psychiatric/Behavioral: Positive for sleep disturbance.   All other systems reviewed and are negative.      Outpatient Encounter Medications as of 7/2/2020   Medication Sig Dispense Refill    aspirin 81 mg Tab Take 1 tablet by mouth Daily.      atorvastatin (LIPITOR) 40 MG tablet Take 1 tablet (40 mg total) by mouth once daily. 90 tablet 3    buPROPion (WELLBUTRIN XL) 300 MG 24 hr tablet Take 1 tablet (300 mg total) by mouth once daily. 90 tablet 0    cholecalciferol, vitamin D3, 2,000 unit Tab Take 1 tablet by mouth Daily.      hydrocodone-acetaminophen 10-325mg (NORCO)  mg Tab Take 1 tablet by mouth every 8  (eight) hours as needed. 75 tablet 0    losartan-hydrochlorothiazide 100-25 mg (HYZAAR) 100-25 mg per tablet Take 1 tablet by mouth once daily. 90 tablet 3    metFORMIN (GLUCOPHAGE-XR) 500 MG 24 hr tablet TAKE 1 TABLET BY MOUTH TWICE A DAY WITH MEALS 180 tablet 3    multivitamin-Ca-iron-minerals (ESSENTIAL DAILY) 18-0.4 mg Tab Take 1 tablet by mouth Daily.      pantoprazole (PROTONIX) 40 MG tablet Take 1 tablet (40 mg total) by mouth once daily. 90 tablet 3    sertraline (ZOLOFT) 100 MG tablet Take 2 tablets (200 mg total) by mouth once daily. 180 tablet 0    tiZANidine (ZANAFLEX) 4 MG tablet TAKE 1 TABLET BY MOUTH EVERY 6 HOURS AS NEEDED 360 tablet 3    traZODone (DESYREL) 100 MG tablet Take 1/2 - 1 tablet at night, as needed for sleep. 90 tablet 0    [DISCONTINUED] famotidine (PEPCID) 10 MG tablet Take 10 mg by mouth 2 (two) times daily.       No facility-administered encounter medications on file as of 7/2/2020.        Objective:     Vital Signs (Most Recent)   ]  Wt Readings from Last 2 Encounters:   03/02/20 89.1 kg (196 lb 6.9 oz)   02/26/20 88.3 kg (194 lb 12.4 oz)       Physical Exam   Constitutional: She appears well-developed and well-nourished. She is obese.   HENT:   Mouth/Throat: Mallampati Score: III.   Pulmonary/Chest: Breath sounds normal.   Nursing note and vitals reviewed.      Laboratory  Lab Results   Component Value Date    WBC 9.97 01/23/2020    RBC 4.69 01/23/2020    HGB 14.9 01/23/2020    HCT 46.8 01/23/2020     (H) 01/23/2020    MCH 31.8 (H) 01/23/2020    MCHC 31.8 (L) 01/23/2020    RDW 12.5 01/23/2020     01/23/2020    MPV 9.5 01/23/2020    GRAN 7.2 01/23/2020    GRAN 72.5 01/23/2020    LYMPH 1.9 01/23/2020    LYMPH 18.8 01/23/2020    MONO 0.6 01/23/2020    MONO 5.9 01/23/2020    EOS 0.2 01/23/2020    BASO 0.06 01/23/2020    EOSINOPHIL 1.9 01/23/2020    BASOPHIL 0.6 01/23/2020       BMP  Lab Results   Component Value Date     01/23/2020    K 3.9 01/23/2020    CL  101 01/23/2020    CO2 27 01/23/2020    BUN 11 01/23/2020    CREATININE 0.8 01/23/2020    CALCIUM 10.1 01/23/2020    ANIONGAP 14 01/23/2020    ESTGFRAFRICA >60.0 01/23/2020    EGFRNONAA >60.0 01/23/2020    AST 25 01/23/2020    ALT 20 01/23/2020    PROT 7.6 01/23/2020       No results found for: BNP    Lab Results   Component Value Date    TSH 1.910 01/23/2020       Lab Results   Component Value Date    SEDRATE 2 09/26/2012       Lab Results   Component Value Date    CRP 3.7 09/26/2012     No results found for: IGE     No results found for: ASPERGILLUS  No results found for: AFUMIGATUSCL     No results found for: ACE     Diagnostic Results:  I have personally reviewed today the following studies:  Last CXR 2017  Comparison: 01/05/2016     Findings: There is some linear stranding densities noted within the right mid to lower lung zone most consistent with some atelectasis.  No infiltrates or pleural effusions.  The heart is enlarged.  IMPRESSION:         1.  As above            Assessment/Plan:     Problem List Items Addressed This Visit     NALLELY (generalized anxiety disorder)    Relevant Orders    Polysomnogram (CPAP will be added if patient meets diagnostic criteria.)    Moderate episode of recurrent major depressive disorder    Current Assessment & Plan     Follow-up with psychiatry.  Vi am Zoloft, Vistaril, Wellbutrin,         Relevant Orders    Polysomnogram (CPAP will be added if patient meets diagnostic criteria.)    Mixed hyperlipidemia    Current Assessment & Plan     Stable on Lipitor         Relevant Orders    Polysomnogram (CPAP will be added if patient meets diagnostic criteria.)    Essential hypertension    Current Assessment & Plan     Stable on hydrochlorothiazide and losartan         Relevant Orders    Polysomnogram (CPAP will be added if patient meets diagnostic criteria.)    Smoker    Current Assessment & Plan     Assistance with smoking cessation was offered, including:  []  Medications  [x]   Counseling  []  Printed Information on Smoking Cessation  []  Referral to a Smoking Cessation Program    Patient was counseled regarding smoking for 3-10 minutes.    Patient also meets criteria for low-dose screening CT scan             Relevant Orders    CT Chest Lung Screening Low Dose    Type 2 diabetes mellitus with diabetic cataract, without long-term current use of insulin - Primary    Current Assessment & Plan     Stable on metformin         Relevant Orders    Polysomnogram (CPAP will be added if patient meets diagnostic criteria.)    Primary insomnia    Current Assessment & Plan     Insomnia severity score 14  Patient feels may have improved with current medication trazodone, Wellbutrin, Vistaril  Need to exclude underlying sleep disorder.  Discussed sleep hygiene  Discussed regular bed and wake time         Relevant Orders    Polysomnogram (CPAP will be added if patient meets diagnostic criteria.)      Other Visit Diagnoses     JAMIR (obstructive sleep apnea)        Relevant Orders    MyChart Patient Entered Blood Pressure    MyChart Patient Entered Temperature    MyChart Patient Entered Respiratory    MyChart Patient Entered Pulse    MyChart Patient Entered Height    MyChart Patient Entered Body Measurements    MyChart Patient Entered Blood Pressure    [facility] MYCHART ASSIGN QUESTIONNAIRE SERIES    Polysomnogram (CPAP will be added if patient meets diagnostic criteria.)    Encounter for screening for malignant neoplasm of respiratory organs        Relevant Orders    CT Chest Lung Screening Low Dose    Personal history of nicotine dependence         Relevant Orders    CT Chest Lung Screening Low Dose        My recommendation at this point would be to set up a HOME SLEEP TEST or INLAB POLYSOMNOGRAPHY  through [facility] ( [phone].    We have discussed weight loss , non supine position, good sleep hygiene, and  other interventions to foster good natural healthy sleep.  We have discussed  behavioral modifications, as well.  After her study, she  could certainly try PAP therapy or out suitable alternatives       Follow up in about 4 weeks (around 7/30/2020), or LDCT, PSG, weight loss, Smoking cessation.    This note was prepared using voice recognition system and is likely to have sound alike errors that may have been overlooked even after proof reading.  Please call me with any questions    Discussed diagnosis, its evaluation, treatment and usual course. All questions answered.      Barry Loo MD

## 2020-07-02 NOTE — PATIENT INSTRUCTIONS
Sleep Hygiene: The healthy habits of Good sleep    www.sleepeducation.fitkit    1. Don't go to bed unless you are sleepy: If you are not sleepy at bedtime , then do something else. Read a book, listen to soft music or browse through a magazine. Find something relaxing , but not stimulating to take your mind off worries about sleep, this will relax your body and distract your mind.    2. If you are not asleep after 20 minutes, then get out of bed.  Find something else to do that will make you feel relaxed. If you can, do this in another room. Your bedroom should be where you go to sleep. It is not a place to go when you are bored. Once you feel sleepy again, go back to bed.    3. Begin rituals than help you relax each night before bed.  This can include such things like a warm bath, light snack or a few minutes of reading.    4. Get up at the same time every morning.  Do this even on weekends and holidays.    5. Get a full night's sleep on a regular basis.  Get enough sleep so that you feel well rested nearly every day.    6. Avoid taking naps if you can.  If you must take a nap, try to keep it short (less than one hour). Never take a nap after 3 pm.    7. Keep a regular schedule.  Regular times for meals, medications, chores and other activities help keep the inner body clock running smoothly.    8. Don't read, write, eat, watch TV, talk on the phone, play with electronics or play cards in bed.    9. Do not have any caffeine after lunch.    10. Do not have a beer, a glass of wine, or any other alcohol within six hours of your bedtime.    11. Do not have a cigarette or any other source of nicotine before bedtime.    12. Do not go to bed hungry, but don't eat a big meal near bedtime either.    13. Avoid any tough exercise within six hours of your bedtime.  You should exercise on a regular basis, but do it earlier in the day,(talk to your doctor before you begin an exercise program)    14.  Avoid sleeping pills, or use  them cautiously.  Most doctors do not prescribe sleeping pills for periods of more than hree weeks. Do not drink alcohol while taking sleeping pills.    15. Try to get rid of or deal with things that make you worry.  If you are unable to do this, then find a time during the day to get all your worries out of your system. Your bed is a place to rest, not a place to worry.    16. Make you bedroom quiet, dark and a little bit cool.  An easy way to remember this; it should remind you of a cave, while this may not sound romantic, it seems to work for bats. Bats are champion sleepers. They get about 16 hours of sleep each day. Maybe it's because they sleep in dark , cool caves.    Https://www.Goodmail Systemsube.com/watch?v=7BwzCtCb4mN    Https://www.Goodmail Systemsube.com/watch?v=KeVZlPi8tyz    Https://www.youVizibilityube.com/watch?v=_wXgyZvkHmU    Https://www.Goodmail Systemsube.com/watch?v=G8QbWdzL9gI    https://www.youVizibilityube.com/watch?v=pwNMvUXTgDY    Thank you for using [facility] and your visit to our facility.  To rate you experience with Dr Barry Loo please click on link below    [email or web address]    Rate your Physician             Your provider has scheduled you for a sleep study.   You should be receiving a phone call from the sleep lab shortly after your study has been approved by your insurance. Please make sure you have your current phone numbers in the [facility] system. If you do not hear from anyone in the next 10 business days, please call the sleep lab at [phone] to schedule your sleep study. The sleep studies are performed at [facility] seven nights a week.  When you are scheduling your sleep study, they will also make you a follow up appointment with your provider. This follow up appointment will be 10-14 days after your sleep study to review the results. If it is noted that you do have sleep apnea on your initial sleep study, you may receive a call back for a second  night study with the CPAP before you come back to the office.

## 2020-07-02 NOTE — ASSESSMENT & PLAN NOTE
Insomnia severity score 14  Patient feels may have improved with current medication trazodone, Wellbutrin, Vistaril  Need to exclude underlying sleep disorder.  Discussed sleep hygiene  Discussed regular bed and wake time

## 2020-07-02 NOTE — ASSESSMENT & PLAN NOTE
Assistance with smoking cessation was offered, including:  []  Medications  [x]  Counseling  []  Printed Information on Smoking Cessation  []  Referral to a Smoking Cessation Program    Patient was counseled regarding smoking for 3-10 minutes.    Patient also meets criteria for low-dose screening CT scan

## 2020-07-02 NOTE — LETTER
July 2, 2020      Cara Ford NP  [address]  [place] [address]           [facility]  [address]  [place] [address]  Phone: [phone]  Fax: [phone]          Patient: Melita Romero   MR Number: [id number]   YOB: 1960   Date of Visit: 7/2/2020       Dear Cara Ford:    Thank you for referring Melita Romero to me for evaluation. Attached you will find relevant portions of my assessment and plan of care.    If you have questions, please do not hesitate to call me. I look forward to following Melita Romero along with you.    Sincerely,    Barry Loo MD    Enclosure  CC:  No Recipients    If you would like to receive this communication electronically, please contact [email or web address] or [phone] to request more information on [employer] Link access.    For providers and/or their staff who would like to refer a patient to [facility], please contact us through our one-stop-shop provider referral line, [facility], at [phone].    If you feel you have received this communication in error or would no longer like to receive these types of communications, please e-mail [email or web address]

## 2020-07-06 DIAGNOSIS — G47.33 OBSTRUCTIVE SLEEP APNEA (ADULT) (PEDIATRIC): Primary | ICD-10-CM

## 2020-07-09 ENCOUNTER — OFFICE VISIT (OUTPATIENT)
Dept: PSYCHIATRY | Facility: CLINIC | Age: 60
End: 2020-07-09
Payer: MEDICARE

## 2020-07-09 DIAGNOSIS — F41.1 GAD (GENERALIZED ANXIETY DISORDER): ICD-10-CM

## 2020-07-09 DIAGNOSIS — F33.1 MODERATE EPISODE OF RECURRENT MAJOR DEPRESSIVE DISORDER: Primary | ICD-10-CM

## 2020-07-09 PROCEDURE — 90834 PSYTX W PT 45 MINUTES: CPT | Mod: HCNC,95,, | Performed by: SOCIAL WORKER

## 2020-07-09 PROCEDURE — 90834 PR PSYCHOTHERAPY W/PATIENT, 45 MIN: ICD-10-PCS | Mod: HCNC,95,, | Performed by: SOCIAL WORKER

## 2020-07-09 NOTE — PROGRESS NOTES
Individual Psychotherapy Follow-up Visit Progress Note (PhD/LCSW)     Outpatient - Insight oriented psychotherapy 45 min - CPT code 58411 *Pt logged into session 17 minutes past the scheduled appointment time    Virtual visit with synchronous audio/video, Location of patient: home    Each patient provided medical services by telemedicine is: (1) informed of the relationship between the provider and patient and the respective role of any other health care provider with respect to management of the patient; and (2) notified that he or she may decline to receive medical services by telemedicine and may withdraw from such care at any time.    7/9/2020  MRN: [id number]  Primary Care Provider: Janett Mccauley MD    Melita Romero is a 59 y.o. female who presents today for follow-up of depression and anxiety. Met with patient.        Subjective:       Patient report: Reports she had a lot of anxiety about today's appointment; avoided logging on; fears rejection and being judged. She feels unmotivated to bathe or clean her home. Engages in a lot of negative self-talk. She procrastinates and is afraid of making the wrong decision. Endorses shame, low self esteem, constant self criticism. Recognizes that she needs help and wants to make changes in her life but is also afraid of failure.     Current symptoms:   · Depression: depressed mood, diminished interest, fatigue, worthlessness/guilt, poor concentration, tearfulness and social isolation  · Anxiety: decreased memory, excessive anxiety/worry, restlessness/keyed up and muscle tension  · Substance abuse: denied  · Cognitive functioning: denied  · Debra: none noted  · Psychosis: none noted    Psychosocial stressors and topics discussed: identifying feelings, symptom recognition/mgmt, goals, coping strategies, codependency, boundaries, grief, relationship patterns, managing anxiety/panic/stress, motivation for change and challenging thought distortions      Objective:        Mental Status Evaluation  Appearance: age appropriate, casually dressed, obese  Behavior: cooperative, tearful  Speech: normal tone, normal rate, normal pitch, normal volume  Mood: anxious, depressed  Affect: congruent and appropriate  Thought Process: normal and logical  Thought Content: normal, no suicidality, no homicidality, delusions, or paranoia  Sensorium: grossly intact  Cognition: grossly intact  Insight: good  Judgment: adequate to circumstances    Risk parameters:  Patient reports no suicidal ideation  Patient reports no homicidal ideation  Patient reports no self-injurious behavior  Patient reports no violent behavior      Assessment & Plan:       Therapeutic interventions used: Educated pt re: how anxious fears are maintained by a cycle of unwarranted fear and avoidance that precludes positive, corrective experiences with the feared object/situation  Pt was taught the role of distorted thinking in precipitating emotional responses  Pt was assisted in gaining insight into how worry is a form of avoidance of a feared problem and how it creates chronic tension  Encouraged pt to share feelings of depression to clarify them and gain insight into their causes  Assessed pt's level of insight toward the presenting problems  Monitored the effectiveness and side effects of antidepressant medication prescribed by physician/NP/MP   Consistent eye contact, active listening, unconditional positive regard and warm acceptance were used to help build trust  Asked pt to describe his/her feelings about himself/herself and how he/she sees himself/herself as compared with others  Assisted pt in becoming more aware of his/her fear of rejection and how that fear is connected with past experiences of rejection or abandonment     The patient's response to the interventions is accepting    The patient's progress toward treatment goals is fair     Homework assigned: daily journaling and write down 3 goals each morning      Treatment plan:   A. Target symptoms: Depression, Anxiety and Poor Coping Skills   B. Therapeutic modalities: insight oriented psychotherapy, supportive psychotherapy  C. Why chosen therapy is appropriate versus another modality: relevant to diagnosis, evidence based practice   D. Outcome monitoring methods: self-report, observation     Visit Diagnosis:   1. Moderate episode of recurrent major depressive disorder    2. NALLELY (generalized anxiety disorder)        Follow-up: individual psychotherapy and medication management by physician    Return to Clinic: 2 weeks    Length of Service (minutes): 45      ARTI Vargas, LCSW  Outpatient Psychiatry  [facility]  [phone]

## 2020-07-14 ENCOUNTER — LAB VISIT (OUTPATIENT)
Dept: OTOLARYNGOLOGY | Facility: CLINIC | Age: 60
End: 2020-07-14
Payer: MEDICARE

## 2020-07-14 DIAGNOSIS — G47.33 OBSTRUCTIVE SLEEP APNEA (ADULT) (PEDIATRIC): ICD-10-CM

## 2020-07-14 PROCEDURE — U0003 INFECTIOUS AGENT DETECTION BY NUCLEIC ACID (DNA OR RNA); SEVERE ACUTE RESPIRATORY SYNDROME CORONAVIRUS 2 (SARS-COV-2) (CORONAVIRUS DISEASE [COVID-19]), AMPLIFIED PROBE TECHNIQUE, MAKING USE OF HIGH THROUGHPUT TECHNOLOGIES AS DESCRIBED BY CMS-2020-01-R: HCPCS | Mod: HCNC

## 2020-07-15 ENCOUNTER — HOSPITAL ENCOUNTER (OUTPATIENT)
Dept: RADIOLOGY | Facility: HOSPITAL | Age: 60
Discharge: HOME | End: 2020-07-15
Attending: INTERNAL MEDICINE
Payer: MEDICARE

## 2020-07-15 DIAGNOSIS — F17.200 SMOKER: ICD-10-CM

## 2020-07-15 DIAGNOSIS — Z12.2 ENCOUNTER FOR SCREENING FOR MALIGNANT NEOPLASM OF RESPIRATORY ORGANS: ICD-10-CM

## 2020-07-15 DIAGNOSIS — Z87.891 PERSONAL HISTORY OF NICOTINE DEPENDENCE: ICD-10-CM

## 2020-07-15 LAB — SARS-COV-2 RNA RESP QL NAA+PROBE: NOT DETECTED

## 2020-07-15 PROCEDURE — G0297 LDCT FOR LUNG CA SCREEN: HCPCS | Mod: TC,HCNC

## 2020-07-15 PROCEDURE — G0297 CT CHEST LUNG SCREENING LOW DOSE: ICD-10-PCS | Mod: 26,HCNC,, | Performed by: RADIOLOGY

## 2020-07-15 PROCEDURE — G0297 LDCT FOR LUNG CA SCREEN: HCPCS | Mod: 26,HCNC,, | Performed by: RADIOLOGY

## 2020-07-17 ENCOUNTER — HOSPITAL ENCOUNTER (OUTPATIENT)
Dept: SLEEP MEDICINE | Facility: HOSPITAL | Age: 60
Discharge: HOME | End: 2020-07-17
Attending: INTERNAL MEDICINE
Payer: MEDICARE

## 2020-07-17 DIAGNOSIS — G47.61 PLMD (PERIODIC LIMB MOVEMENT DISORDER): ICD-10-CM

## 2020-07-17 DIAGNOSIS — F33.1 MODERATE EPISODE OF RECURRENT MAJOR DEPRESSIVE DISORDER: ICD-10-CM

## 2020-07-17 DIAGNOSIS — I10 ESSENTIAL HYPERTENSION: ICD-10-CM

## 2020-07-17 DIAGNOSIS — F51.01 PRIMARY INSOMNIA: ICD-10-CM

## 2020-07-17 DIAGNOSIS — E78.2 MIXED HYPERLIPIDEMIA: ICD-10-CM

## 2020-07-17 DIAGNOSIS — F41.1 GAD (GENERALIZED ANXIETY DISORDER): ICD-10-CM

## 2020-07-17 DIAGNOSIS — E11.36 TYPE 2 DIABETES MELLITUS WITH DIABETIC CATARACT, WITHOUT LONG-TERM CURRENT USE OF INSULIN: ICD-10-CM

## 2020-07-17 DIAGNOSIS — G47.33 OSA (OBSTRUCTIVE SLEEP APNEA): ICD-10-CM

## 2020-07-17 DIAGNOSIS — G47.00 INSOMNIA, UNSPECIFIED TYPE: ICD-10-CM

## 2020-07-17 PROCEDURE — 95810 PR POLYSOMNOGRAPHY, 4 OR MORE: ICD-10-PCS | Mod: 26,HCNC,, | Performed by: INTERNAL MEDICINE

## 2020-07-17 PROCEDURE — 95810 POLYSOM 6/> YRS 4/> PARAM: CPT | Mod: 26,HCNC,, | Performed by: INTERNAL MEDICINE

## 2020-07-17 PROCEDURE — 95810 POLYSOM 6/> YRS 4/> PARAM: CPT | Mod: HCNC

## 2020-07-17 NOTE — Clinical Note
No Significant Obstructive Sleep apnea(JAMIR)  EKG showed no cardiac abnormalities.  Mild Oxygen Desaturation  EEG did not show alpha intrusion.  The patient snored with soft snoring volume.  Significant periodic leg movements(PLMs) during sleep. However, mild significant associated arousals.  No Significant Central Sleep Apnea (CSA)  Reduced sleep efficiency, long primary sleep latency, no REM sleep latency and long slow wave latency.  Reduced testing in Supine position may underestimate JAMIR severity  Melita Romero - 1960  Page 2 of 3  Electronically Authenticated By Barry Loo MD on 07/20/2020 09:42 AM, from [phone]  Barry Loo MD  NPI: [id number]  RECOMMENDATIONS  MISCELLANEOUS  Nocturnal Hypoxemia (G47.36)  Circardian rhythm disorder  Insomnia related to Sleep initiation/sleep maintaince/terminal  Periodic limb movement disorder

## 2020-07-20 NOTE — PROCEDURES
"No Significant Obstructive Sleep apnea(JAMIR)  EKG showed no cardiac abnormalities.  Mild Oxygen Desaturation  EEG did not show alpha intrusion.  The patient snored with soft snoring volume.  Significant periodic leg movements(PLMs) during sleep. However, mild significant associated arousals.  No Significant Central Sleep Apnea (CSA)  Reduced sleep efficiency, long primary sleep latency, no REM sleep latency and long slow wave latency.  Reduced testing in Supine position may underestimate JAMIR severity  Melita Romero - 1960  Page 2 of 3  Electronically Authenticated By Barry Loo MD on 07/20/2020 09:42 AM, from [phone]  Barry Loo MD  NPI: [id number]  RECOMMENDATIONS  MISCELLANEOUS  Nocturnal Hypoxemia (G47.36)  Circardian rhythm disorder  Insomnia related to Sleep initiation/sleep maintaince/terminal  Periodic limb movement disorder    See imported Sleep Study result in "Chart Review" under the   "Media tab".      (This Sleep Study was interpreted by a Board Certified Sleep   Specialist who conducted an epoch-by-epoch review of the entire   raw data recording.)     (The indication for this sleep study was reviewed and deemed   appropriate by [facility] Practice Parameters or other reasons by a   Board Certified Sleep Specialist.)    Barry Loo MD     "

## 2020-07-21 ENCOUNTER — OFFICE VISIT (OUTPATIENT)
Dept: PSYCHIATRY | Facility: CLINIC | Age: 60
End: 2020-07-21
Payer: MEDICARE

## 2020-07-21 DIAGNOSIS — F33.1 MODERATE EPISODE OF RECURRENT MAJOR DEPRESSIVE DISORDER: Primary | ICD-10-CM

## 2020-07-21 DIAGNOSIS — F51.01 PRIMARY INSOMNIA: ICD-10-CM

## 2020-07-21 DIAGNOSIS — F41.1 GAD (GENERALIZED ANXIETY DISORDER): ICD-10-CM

## 2020-07-21 PROCEDURE — 90834 PSYTX W PT 45 MINUTES: CPT | Mod: 95,,, | Performed by: SOCIAL WORKER

## 2020-07-21 PROCEDURE — 90834 PR PSYCHOTHERAPY W/PATIENT, 45 MIN: ICD-10-PCS | Mod: 95,,, | Performed by: SOCIAL WORKER
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
## 2020-07-23 ENCOUNTER — TELEPHONE (OUTPATIENT)
Dept: PULMONOLOGY | Facility: CLINIC | Age: 60
End: 2020-07-23

## 2020-07-23 NOTE — TELEPHONE ENCOUNTER
----- Message from Barry Loo MD sent at 7/17/2020  2:42 PM CDT -----  Needs to come in on review CT scan.  Small nodule 4 mm.  Will need a follow-up CT scan in 12 months

## 2020-08-21 ENCOUNTER — OFFICE VISIT (OUTPATIENT)
Dept: PSYCHIATRY | Facility: CLINIC | Age: 60
End: 2020-08-21
Payer: MEDICARE

## 2020-08-21 DIAGNOSIS — F41.1 GAD (GENERALIZED ANXIETY DISORDER): ICD-10-CM

## 2020-08-21 DIAGNOSIS — F51.01 PRIMARY INSOMNIA: Primary | ICD-10-CM

## 2020-08-21 DIAGNOSIS — F33.1 MODERATE EPISODE OF RECURRENT MAJOR DEPRESSIVE DISORDER: ICD-10-CM

## 2020-08-21 PROCEDURE — 99214 OFFICE O/P EST MOD 30 MIN: CPT | Mod: HCNC,95,, | Performed by: NURSE PRACTITIONER

## 2020-08-21 PROCEDURE — 99214 PR OFFICE/OUTPT VISIT, EST, LEVL IV, 30-39 MIN: ICD-10-PCS | Mod: HCNC,95,, | Performed by: NURSE PRACTITIONER

## 2020-08-21 RX ORDER — MIRTAZAPINE 15 MG/1
15 TABLET, FILM COATED ORAL NIGHTLY
Qty: 30 TABLET | Refills: 2 | Status: SHIPPED | OUTPATIENT
Start: 2020-08-21 | End: 2021-02-19 | Stop reason: SDUPTHER

## 2020-08-21 RX ORDER — BUPROPION HYDROCHLORIDE 300 MG/1
300 TABLET ORAL DAILY
Qty: 90 TABLET | Refills: 0 | Status: SHIPPED | OUTPATIENT
Start: 2020-08-21 | End: 2021-02-19

## 2020-08-21 RX ORDER — SERTRALINE HYDROCHLORIDE 100 MG/1
200 TABLET, FILM COATED ORAL DAILY
Qty: 180 TABLET | Refills: 0 | Status: SHIPPED | OUTPATIENT
Start: 2020-08-21 | End: 2021-02-19 | Stop reason: SDUPTHER

## 2020-08-21 RX ORDER — HYDROXYZINE PAMOATE 25 MG/1
25 CAPSULE ORAL EVERY 8 HOURS PRN
Qty: 60 CAPSULE | Refills: 2 | Status: SHIPPED | OUTPATIENT
Start: 2020-08-21 | End: 2020-10-20
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
## 2020-08-21 NOTE — PATIENT INSTRUCTIONS
Understanding Anxiety Disorders  Almost everyone gets nervous now and then. Its normal to have knots in your stomach before a test, or for your heart to race on a first date. But an anxiety disorder is much more than a case of nerves. In fact, its symptoms may be overwhelming. But treatment can relieve many of these symptoms. Talking to your healthcare provider is the first step.    What are anxiety disorders?  An anxiety disorder causes intense feelings of panic and fear. These feelings may arise for no apparent reason. And they tend to recur again and again. They may prevent you from coping with life and cause you great distress. As a result, you may avoid anything that triggers your fear. In extreme cases, you may never leave the house. Anxiety disorders may cause other symptoms, such as:  · Obsessive thoughts you cant control  · Constant nightmares or painful thoughts of the past  · Nausea, sweating, and muscle tension  · Trouble sleeping or concentrating  What causes anxiety disorders?  Anxiety disorders tend to run in families. For some people, childhood abuse or neglect may play a role. For others, stressful life events or trauma may trigger anxiety disorders. Anxiety can trigger low self-esteem and poor coping skills.  Common anxiety disorders  · Panic disorder. This causes an intense fear of being in danger.  · Phobias. These are extreme fears of certain objects, places, or events.  · Obsessive-compulsive disorder. This causes you to have unwanted thoughts and urges. You also may perform certain actions over and over.  · Posttraumatic stress disorder. This occurs in people who have survived a terrible ordeal. It can cause nightmares and flashbacks about the event.  · Generalized anxiety disorder. This causes constant worry that can greatly disrupt your life.   Getting better  You may believe that nothing can help you. Or, you might fear what others may think. But most anxiety symptoms can be eased.  Having an anxiety disorder is nothing to be ashamed of. Most people do best with treatment that combines medicine and therapy. These arent cures. But they can help you live a healthier life.  Date Last Reviewed: 2/1/2017 © 2000-2017 [employer]. [address] [place] [address], [place] [address]. All rights reserved. This information is not intended as a substitute for professional medical care. Always follow your healthcare professional's instructions.          Insomnia    Sleep problems are fairly common. In fact, one in four people experience sleep  difficulties, which include trouble falling asleep, trouble staying asleep, early morning  waking, sleeping too much, or restless or unsatisfying sleep. Getting a good nights  sleep can improve your mental well-being and help you to better manage your anxiety.  The good news is that there are things you can do to improve your sleep.  TIP: Sleep problems can be the result of various conditions or  medical problems. Therefore, it is important to discuss your sleep  problems with your doctor.  To improve your sleep, try some of the following strategies:  Create a Comfortable Sleep Environment. If you want to have a good sleep, it helps  to create a comfortable sleep environment. Make sure that you have a supportive  mattress and fresh, comfortable bedding. Also, try to ensure that your room is not too  hot or cold, minimize noise, and block out light.  Relax. Try doing something to relax your body and mind before going to bed. Try  taking a hot bath 90 minutes before you plan to go to bed. Or, try a relaxation exercise  (see Calm Breathing and Progressive Muscle Relaxation), meditation, or listening to  calming music.  Have a Snack. Although a heavy meal late in the evening can disrupt sleep, a healthy  light snack in the evening can improve sleep. Try eating light cheese and crackers,  turkey, or bananas, or drink a warm glass of milk. Avoid heavy, spicy, or  sugary foods.  Get Physical. People who exercise tend to have more restful sleep. Exercising for at  least 30 minutes three times a week can improve your sleep. So, get moving! Go for a  walk or a run. The best time to exercise is in the late afternoon or early evening.  Exercising in the morning, while good for you, wont help with sleep. Exercising less than  two hours before bedtime can actually interfere with sleep.  Set a Bedtime Routine. Having a bedtime routine cues your body that its time to  sleep. So, establish a set routine that you follow every night. For example, have a hot  bath, put on your pajamas, brush your teeth, and then listen to soft music and read on  the couch until you start to feel sleepy and then go to bed.   © Anxiety Radha 2  Establish a Fixed Awakening Time. Try waking up at the same time every day (even  on weekends) no matter how well or how poorly you have slept. This way your body will  begin to get used to a regular sleep rhythm.  Sleep Only When Sleepy. Dont force yourself into bed at a particularly time if youre  not feeling sleepy. Youll only lie awake in bed, frustrated that you cant sleep.  Just for Sleeping. Your bed should be used strictly for sleeping (sex is the only  exception). Try to avoid reading, watching television, working, or studying in bed,  because these activities keep your mind active, which gets in the way of sleep.  Get Out of Bed. If you cant fall asleep after 20 to 30 minutes, get out of bed and do  something boring (e.g., read the manual on how to program your clock radio, read the  sports section of the newspaper if youre not a sports fan) or try relaxing (e.g., meditate,  listen to calm music, have a warm de-caffeinated drink). When you start to feel sleepy,  try going back to bed. This strategy can feel like you are making things worse, but if you  stick with it, it can really help.  Dont Worry. Leave your worries about work, school, health,  relationships, etc. out of  the bedroom. Try scheduling a worry time earlier in the evening to deal with your  worries. If you wake up in the middle of the night worrying, try writing down your worries  and tell yourself that you will address them in the morning.  TIP: Worrying about not sleeping doesnt help - it just makes it more likely  that you wont sleep. Let go of your belief that you have to get eight hours  of sleep or you cant function. Stop looking at the clock and stop trying to  make yourself fall sleep. It will happen when it happens.  Avoid Caffeine. Avoid consuming caffeine at least four hours before bedtime. This  includes coffee, some teas, soft drinks, and chocolate. Caffeine is a stimulant and it can  keep you awake.  Avoid Alcohol. Although you may think that alcohol will help you fall asleep, it  interferes with sleep later in the evening. So, try to avoid consuming alcohol at least  four hours before bed.  Dont Smoke Before Bed. Try to avoid smoking at least four hours before bedtime as it  can interfere with a good nights sleep.  Skip the Nap. Naps can interfere with normal sleep cycles. So, if youre having trouble  sleeping, avoid taking naps. That way, your body will be more tired when its bedtime.  Get Some Natural Light. Try to spend some time outdoors or in natural light every day.  Getting some sunlight early in the day can be helpful for setting your bodys natural  wake and sleep cycle.   © Anxiety Radha 3  Keys to Success:  Start Small! Making small changes can have a large impact on your sleep. Dont  try to do everything all at once. Instead, pick one or two strategies and try them  consistently. When youre ready, try adding a new strategy. The goal is to slowly  start increasing behaviours that can help you sleep, while reducing the things that  are interfering with your sleep.  Be Consistent. Pick a strategy and use it consistently. Try to do the same thing  every  night.  Be Patient. These strategies can take time to improve your sleep. In fact,  sometimes things can get worse before they get better. Hang in there and stick  with it!  Chart Your Progress. Use the Sleep Diary form to keep track of the strategies  youre using and your weekly progress.    Don't:    Don't:  1. Exercise just before going to bed. Try to keep it no closer than 3-4 hrs before bed.  2. Engage in stimulating activity just before bed, such as playing a competitive game,  watching an exciting program on television or movie, or having an important discussion  with a loved one.  3. Have caffeine in the evening (coffee, many teas, chocolate, sodas, etc.)  4. Read or watch television in bed.  5. Use alcohol to help you sleep. It actually interrupts your sleep cycle.  6. Go to bed too hungry or too full.  7. Take another person's sleeping pills.  8. Take over-the-counter sleeping pills, without your doctor's knowledge. Tolerance can  develop rapidly with these medications.  9. Take daytime naps. If you do, keep them to no more than 20 minutes, 8 hrs before  bedtime.  10. Command yourself to go to sleep. This only makes your mind and body more alert.  11. Watch the clock or count minutes; this usually causes more anxiety, which keeps you  up.  12. Lie in bed awake for more than 20-30 minutes. Instead, get up, go to a different  room (or different part of the bedroom), participate in a quiet activity (e.g. non-excitable  reading), and then return to bed when you feel sleepy. Do not turn on lights or sit in front  of a bright TV or computer, this will stimulate your brain to wake up. Stay in a dark,  quiet place. Do this as many times during the night as needed.  13. Succumb to maladaptive thoughts like: Oh no, look how late it is, Ill never get to  sleep or I must have eight hours of sleep each night, if I get less than eight hours of  sleep I will get sick. Challenge your concerns and avoid  catastrophizing. Remember  that we cannot fully control our sleep process. Trying too hard to control it will make  you more tense and more awake.  14. Change your daytime routine the next day if you didnt sleep well. Even if you have  a bad night sleep and are tired it is important that you try to keep your daytime activities  the same as you had planned. That is, dont avoid activities or stay in bed late because  you feel tired. This can reinforce the insomnia.  15. Increase caffeine intakes the next day, this can keep you up again the following night.

## 2020-09-09 ENCOUNTER — TELEPHONE (OUTPATIENT)
Dept: PSYCHIATRY | Facility: CLINIC | Age: 60
End: 2020-09-09

## 2020-09-21 ENCOUNTER — PES CALL (OUTPATIENT)
Dept: ADMINISTRATIVE | Facility: CLINIC | Age: 60
End: 2020-09-21

## 2020-09-24 ENCOUNTER — PATIENT OUTREACH (OUTPATIENT)
Dept: ADMINISTRATIVE | Facility: OTHER | Age: 60
End: 2020-09-24

## 2020-09-24 NOTE — PROGRESS NOTES
Health Maintenance Due   Topic Date Due    Shingles Vaccine (1 of 2) 12/07/2012    Influenza Vaccine (1) 08/01/2020     Updates were requested from care everywhere.  Chart was reviewed for overdue Proactive [facility] Encounters ([facility]) topics (CRS, Breast Cancer Screening, Eye exam)  Health Maintenance has been updated.  LINKS immunization registry triggered.  Immunizations were reconciled.

## 2020-09-29 ENCOUNTER — PATIENT MESSAGE (OUTPATIENT)
Dept: OTHER | Facility: OTHER | Age: 60
End: 2020-09-29

## 2020-09-30 ENCOUNTER — PATIENT MESSAGE (OUTPATIENT)
Dept: PSYCHIATRY | Facility: CLINIC | Age: 60
End: 2020-09-30

## 2020-09-30 ENCOUNTER — OFFICE VISIT (OUTPATIENT)
Dept: PSYCHIATRY | Facility: CLINIC | Age: 60
End: 2020-09-30
Payer: MEDICARE

## 2020-09-30 DIAGNOSIS — F33.1 MODERATE EPISODE OF RECURRENT MAJOR DEPRESSIVE DISORDER: ICD-10-CM

## 2020-09-30 DIAGNOSIS — F41.1 GAD (GENERALIZED ANXIETY DISORDER): Primary | ICD-10-CM

## 2020-09-30 PROCEDURE — 90832 PSYTX W PT 30 MINUTES: CPT | Mod: HCNC,95,, | Performed by: SOCIAL WORKER

## 2020-09-30 PROCEDURE — 90832 PR PSYCHOTHERAPY W/PATIENT, 30 MIN: ICD-10-PCS | Mod: HCNC,95,, | Performed by: SOCIAL WORKER
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
## 2020-09-30 NOTE — PATIENT INSTRUCTIONS
"[facility]  DEPARTMENT OF PSYCHIATRY   PATIENT INFORMATION    We appreciate the opportunity to participate in your medical care and hope the following protocols will make it easier for you to receive quality treatment in our department.    PUNCTUALITY: Your appointment is scheduled for a fixed amount of time, reserved especially for you. To get the benefit of your appointment, please arrive at least 15 minutes early to allow time for traffic, parking and registration.  Should you arrive more than 20 minutes late to your appointment, you will be rescheduled in order to assure your clinician has adequate time to assess you and provide helpful care.      APPOINTMENTS: Appointments are made by the nursing/front office staff or through the patient portal. Providers do not have access to schedule appointments. Walk in appointments are not available. FOR EMERGENCIES, PLEASE CALL 911 OR GO THE NEAREST EMERGENCY ROOM.    CANCELLATION/MISSED APPOINTMENTS:   In order to receive quality care, all appointments must be kept.  If you are unable to keep an appointment, please reschedule at least 3 days prior if possible. Late cancellations (within 24 hours of the appointment) and repeated no-show appointments may result in dismissal from the clinic. After two no show/late cancellation visits, you will receive a notice letter, alerting you to keep visits to prevent department dismissal. If another visit is missed after receipt of the notice, you will be discharged from the clinic. This policy is in effect to allow for other individuals on a long waiting list to be seen as soon as possible. Unlike other branches of medicine where several individuals can be scheduled in a 30 minute time slot, only one individual can be scheduled in any time slot in Psychiatry.     MESSAGES: For simple questions/concerns, you may contact your individual providers electronically through the "[facility]" portal or by calling " [phone] with messages relayed via office staff. If relevant, include pharmacy name and phone number, date of last visit and next scheduled visit, phone number where you can be reached throughout the day, and whether leaving a voicemail or message on an answering machine is acceptable. Messages will be returned by the Medical Assistant or Office Staff after your provider has reviewed the message.  Please allow 24 hours for a returned message before leaving another message. Messages will be checked each workday (Monday through Friday) during office hours (8:00 a.m. and 5:00 p.m.) and returned at most within one business day.  You may leave a non-urgent message after hours. Note that psychotherapy and medication management are not appropriate by telephone or the patient portal.    PRESCRIPTION REFILLS:  Please communicate with your prescriber about any refills you need during your appointment. You may also request refills through the [facility] portal (preferred) or by calling the clinic. Prescriptions will be filled during office hours.      Please do not wait until you are completely out of medication to request refills. Same day refills are not always possible. Patients may experience symptoms of withdrawal if they run out of medications. The patient assumes all responsibility when there is an issue with non-compliance with follow-up appointments and medications.   Some medications are controlled and regulated by the FDA and [place]. Some of these medications can not be refilled before 30 days and require a face to face appointment.     PAPERWORK REQUESTS: If you have any forms or letters that need to be completed by your doctor, please present these at the beginning of the appointment to ensure that information needed to complete them is obtained during the office visit. Paperwork will be returned within 7-10 business days. Staff will call you to  the paperwork when completed.    SPECIAL EVALUATIONS:  "Please note that our department is treatment-focused. As such, we focus on treatment-oriented evaluations and do not perform specialty or "forensic" evaluations. Examples are listed below.     Disability:  We do not do disability evaluations. Please contact Social Security Administration for evaluations and determinations. You will then sign releases allowing for records from your treatment providers to be forwarded to Social Security Administration to use in their evaluation.   Gun Permit:  We do not offer Sound Judgment Evaluations or assessments leading to gun ownership, nor do we fill out or file paperwork relevant to owning, concealing or purchasing a firearm.   Emotional Support Animals (DEVIKA):  We do not provide documentation, including letters, to aid in the acclamation that an Emotional Support Animal is required. Note that ESAs are not trained to perform tasks or recognize particular signs or symptoms. Rather, they are distinguished by the close, emotional, and supportive bond between the animal and the owner.       SAMPLES:  We do not provide samples of any medications. If you have financial difficulties and are on a limited income, you may qualify for Patient Assistance Programs from various pharmaceutical companies. This will require that you complete paperwork with your financial information, but this does not guarantee that the company will approve the application. Alternative medication options can be discussed.    REFERRALS/COORDINATION: You will be referred to other providers if we feel unable to adequately diagnose or treat your particular condition, or if collaboration with another provider would allow for better management of your condition.    "

## 2020-10-26 ENCOUNTER — PES CALL (OUTPATIENT)
Dept: ADMINISTRATIVE | Facility: CLINIC | Age: 60
End: 2020-10-26

## 2020-11-13 ENCOUNTER — PATIENT OUTREACH (OUTPATIENT)
Dept: ADMINISTRATIVE | Facility: HOSPITAL | Age: 60
End: 2020-11-13

## 2020-11-13 NOTE — PROGRESS NOTES
HTN Report. Patient contacted to obtain an updated home BP reading. Patient states she does check her BP sometimes. Patient states she will have to call back at a later time with a reading.

## 2020-11-23 ENCOUNTER — IMMUNIZATION (OUTPATIENT)
Dept: INTERNAL MEDICINE | Facility: CLINIC | Age: 60
End: 2020-11-23
Payer: MEDICARE

## 2020-11-23 PROCEDURE — 90686 FLU VACCINE (QUAD) GREATER THAN OR EQUAL TO 3YO PRESERVATIVE FREE IM: ICD-10-PCS | Mod: HCNC,S$GLB,, | Performed by: FAMILY MEDICINE

## 2020-11-23 PROCEDURE — G0008 FLU VACCINE (QUAD) GREATER THAN OR EQUAL TO 3YO PRESERVATIVE FREE IM: ICD-10-PCS | Mod: HCNC,S$GLB,, | Performed by: FAMILY MEDICINE

## 2020-11-23 PROCEDURE — 90686 IIV4 VACC NO PRSV 0.5 ML IM: CPT | Mod: HCNC,S$GLB,, | Performed by: FAMILY MEDICINE

## 2020-11-23 PROCEDURE — G0008 ADMIN INFLUENZA VIRUS VAC: HCPCS | Mod: HCNC,S$GLB,, | Performed by: FAMILY MEDICINE

## 2020-12-21 ENCOUNTER — PES CALL (OUTPATIENT)
Dept: ADMINISTRATIVE | Facility: CLINIC | Age: 60
End: 2020-12-21

## 2021-01-27 RX ORDER — ATORVASTATIN CALCIUM 40 MG/1
TABLET, FILM COATED ORAL
Qty: 30 TABLET | Refills: 0 | Status: SHIPPED | OUTPATIENT
Start: 2021-01-27 | End: 2021-03-15 | Stop reason: SDUPTHER

## 2021-01-27 RX ORDER — LOSARTAN POTASSIUM AND HYDROCHLOROTHIAZIDE 25; 100 MG/1; MG/1
TABLET ORAL
Qty: 30 TABLET | Refills: 0 | Status: SHIPPED | OUTPATIENT
Start: 2021-01-27 | End: 2021-03-05

## 2021-01-27 RX ORDER — PANTOPRAZOLE SODIUM 40 MG/1
TABLET, DELAYED RELEASE ORAL
Qty: 30 TABLET | Refills: 0 | Status: SHIPPED | OUTPATIENT
Start: 2021-01-27 | End: 2021-03-05

## 2021-01-27 RX ORDER — METFORMIN HYDROCHLORIDE 500 MG/1
TABLET, EXTENDED RELEASE ORAL
Qty: 60 TABLET | Refills: 0 | Status: SHIPPED | OUTPATIENT
Start: 2021-01-27 | End: 2021-03-15 | Stop reason: SDUPTHER

## 2021-01-27 RX ORDER — TIZANIDINE 4 MG/1
TABLET ORAL
Qty: 90 TABLET | Refills: 0 | Status: SHIPPED | OUTPATIENT
Start: 2021-01-27 | End: 2021-03-15 | Stop reason: SDUPTHER

## 2021-02-04 ENCOUNTER — PATIENT OUTREACH (OUTPATIENT)
Dept: ADMINISTRATIVE | Facility: HOSPITAL | Age: 61
End: 2021-02-04

## 2021-02-16 ENCOUNTER — PATIENT MESSAGE (OUTPATIENT)
Dept: PSYCHIATRY | Facility: CLINIC | Age: 61
End: 2021-02-16

## 2021-02-19 ENCOUNTER — OFFICE VISIT (OUTPATIENT)
Dept: PSYCHIATRY | Facility: CLINIC | Age: 61
End: 2021-02-19
Payer: MEDICARE

## 2021-02-19 DIAGNOSIS — F33.1 MODERATE EPISODE OF RECURRENT MAJOR DEPRESSIVE DISORDER: ICD-10-CM

## 2021-02-19 DIAGNOSIS — F41.1 GAD (GENERALIZED ANXIETY DISORDER): ICD-10-CM

## 2021-02-19 DIAGNOSIS — F51.01 PRIMARY INSOMNIA: ICD-10-CM

## 2021-02-19 PROCEDURE — 99214 OFFICE O/P EST MOD 30 MIN: CPT | Mod: 95,,, | Performed by: NURSE PRACTITIONER

## 2021-02-19 PROCEDURE — 99214 PR OFFICE/OUTPT VISIT, EST, LEVL IV, 30-39 MIN: ICD-10-PCS | Mod: 95,,, | Performed by: NURSE PRACTITIONER

## 2021-02-19 RX ORDER — MIRTAZAPINE 15 MG/1
15 TABLET, FILM COATED ORAL NIGHTLY
Qty: 30 TABLET | Refills: 2 | Status: SHIPPED | OUTPATIENT
Start: 2021-02-19 | End: 2021-03-15 | Stop reason: SDUPTHER

## 2021-02-19 RX ORDER — SERTRALINE HYDROCHLORIDE 100 MG/1
200 TABLET, FILM COATED ORAL DAILY
Qty: 180 TABLET | Refills: 0 | Status: SHIPPED | OUTPATIENT
Start: 2021-02-19 | End: 2021-03-15 | Stop reason: SDUPTHER

## 2021-03-04 ENCOUNTER — PATIENT OUTREACH (OUTPATIENT)
Dept: ADMINISTRATIVE | Facility: HOSPITAL | Age: 61
End: 2021-03-04

## 2021-03-05 ENCOUNTER — TELEPHONE (OUTPATIENT)
Dept: ADMINISTRATIVE | Facility: CLINIC | Age: 61
End: 2021-03-05

## 2021-03-15 ENCOUNTER — HOSPITAL ENCOUNTER (OUTPATIENT)
Dept: CARDIOLOGY | Facility: HOSPITAL | Age: 61
Discharge: HOME | End: 2021-03-15
Attending: FAMILY MEDICINE
Payer: MEDICARE

## 2021-03-15 ENCOUNTER — OFFICE VISIT (OUTPATIENT)
Dept: INTERNAL MEDICINE | Facility: CLINIC | Age: 61
End: 2021-03-15
Payer: MEDICARE

## 2021-03-15 VITALS
HEIGHT: 62 IN | WEIGHT: 219.38 LBS | TEMPERATURE: 97 F | DIASTOLIC BLOOD PRESSURE: 76 MMHG | HEART RATE: 88 BPM | BODY MASS INDEX: 40.37 KG/M2 | SYSTOLIC BLOOD PRESSURE: 110 MMHG

## 2021-03-15 DIAGNOSIS — F33.1 MODERATE EPISODE OF RECURRENT MAJOR DEPRESSIVE DISORDER: ICD-10-CM

## 2021-03-15 DIAGNOSIS — Z78.0 POSTMENOPAUSAL: ICD-10-CM

## 2021-03-15 DIAGNOSIS — Z12.31 SCREENING MAMMOGRAM, ENCOUNTER FOR: ICD-10-CM

## 2021-03-15 DIAGNOSIS — F51.01 PRIMARY INSOMNIA: ICD-10-CM

## 2021-03-15 DIAGNOSIS — F41.1 GAD (GENERALIZED ANXIETY DISORDER): ICD-10-CM

## 2021-03-15 DIAGNOSIS — E11.36 TYPE 2 DIABETES MELLITUS WITH DIABETIC CATARACT, WITHOUT LONG-TERM CURRENT USE OF INSULIN: ICD-10-CM

## 2021-03-15 DIAGNOSIS — Z00.00 ROUTINE GENERAL MEDICAL EXAMINATION AT A HEALTH CARE FACILITY: Primary | ICD-10-CM

## 2021-03-15 DIAGNOSIS — I10 ESSENTIAL HYPERTENSION: ICD-10-CM

## 2021-03-15 DIAGNOSIS — E55.9 VITAMIN D DEFICIENCY DISEASE: ICD-10-CM

## 2021-03-15 DIAGNOSIS — Z00.00 ROUTINE GENERAL MEDICAL EXAMINATION AT A HEALTH CARE FACILITY: ICD-10-CM

## 2021-03-15 DIAGNOSIS — M85.80 OSTEOPENIA, UNSPECIFIED LOCATION: ICD-10-CM

## 2021-03-15 PROCEDURE — 1126F PR PAIN SEVERITY QUANTIFIED, NO PAIN PRESENT: ICD-10-PCS | Mod: S$GLB,,, | Performed by: FAMILY MEDICINE

## 2021-03-15 PROCEDURE — 93010 ELECTROCARDIOGRAM REPORT: CPT | Mod: ,,, | Performed by: INTERNAL MEDICINE

## 2021-03-15 PROCEDURE — 3074F SYST BP LT 130 MM HG: CPT | Mod: CPTII,S$GLB,, | Performed by: FAMILY MEDICINE

## 2021-03-15 PROCEDURE — 99396 PR PREVENTIVE VISIT,EST,40-64: ICD-10-PCS | Mod: S$GLB,,, | Performed by: FAMILY MEDICINE

## 2021-03-15 PROCEDURE — 3078F DIAST BP <80 MM HG: CPT | Mod: CPTII,S$GLB,, | Performed by: FAMILY MEDICINE

## 2021-03-15 PROCEDURE — 99214 PR OFFICE/OUTPT VISIT, EST, LEVL IV, 30-39 MIN: ICD-10-PCS | Mod: 25,S$GLB,, | Performed by: FAMILY MEDICINE

## 2021-03-15 PROCEDURE — 3008F PR BODY MASS INDEX (BMI) DOCUMENTED: ICD-10-PCS | Mod: CPTII,S$GLB,, | Performed by: FAMILY MEDICINE

## 2021-03-15 PROCEDURE — 3078F PR MOST RECENT DIASTOLIC BLOOD PRESSURE < 80 MM HG: ICD-10-PCS | Mod: CPTII,S$GLB,, | Performed by: FAMILY MEDICINE

## 2021-03-15 PROCEDURE — 99396 PREV VISIT EST AGE 40-64: CPT | Mod: S$GLB,,, | Performed by: FAMILY MEDICINE

## 2021-03-15 PROCEDURE — 99999 PR PBB SHADOW E&M-EST. PATIENT-LVL V: ICD-10-PCS | Mod: PBBFAC,,, | Performed by: FAMILY MEDICINE

## 2021-03-15 PROCEDURE — 93010 EKG 12-LEAD: ICD-10-PCS | Mod: ,,, | Performed by: INTERNAL MEDICINE

## 2021-03-15 PROCEDURE — 1126F AMNT PAIN NOTED NONE PRSNT: CPT | Mod: S$GLB,,, | Performed by: FAMILY MEDICINE

## 2021-03-15 PROCEDURE — 99214 OFFICE O/P EST MOD 30 MIN: CPT | Mod: 25,S$GLB,, | Performed by: FAMILY MEDICINE

## 2021-03-15 PROCEDURE — 99999 PR PBB SHADOW E&M-EST. PATIENT-LVL V: CPT | Mod: PBBFAC,,, | Performed by: FAMILY MEDICINE

## 2021-03-15 PROCEDURE — 99499 RISK ADDL DX/OHS AUDIT: ICD-10-PCS | Mod: S$GLB,,, | Performed by: FAMILY MEDICINE

## 2021-03-15 PROCEDURE — 3074F PR MOST RECENT SYSTOLIC BLOOD PRESSURE < 130 MM HG: ICD-10-PCS | Mod: CPTII,S$GLB,, | Performed by: FAMILY MEDICINE

## 2021-03-15 PROCEDURE — 93005 ELECTROCARDIOGRAM TRACING: CPT | Mod: PO

## 2021-03-15 PROCEDURE — 3008F BODY MASS INDEX DOCD: CPT | Mod: CPTII,S$GLB,, | Performed by: FAMILY MEDICINE

## 2021-03-15 PROCEDURE — 99499 UNLISTED E&M SERVICE: CPT | Mod: S$GLB,,, | Performed by: FAMILY MEDICINE

## 2021-03-15 RX ORDER — METFORMIN HYDROCHLORIDE 500 MG/1
500 TABLET, EXTENDED RELEASE ORAL 2 TIMES DAILY WITH MEALS
Qty: 180 TABLET | Refills: 1 | Status: SHIPPED | OUTPATIENT
Start: 2021-03-15 | End: 2021-09-03

## 2021-03-15 RX ORDER — ATORVASTATIN CALCIUM 40 MG/1
40 TABLET, FILM COATED ORAL DAILY
Qty: 90 TABLET | Refills: 1 | Status: SHIPPED | OUTPATIENT
Start: 2021-03-15 | End: 2021-09-03

## 2021-03-15 RX ORDER — PANTOPRAZOLE SODIUM 40 MG/1
40 TABLET, DELAYED RELEASE ORAL DAILY
Qty: 90 TABLET | Refills: 1 | Status: SHIPPED | OUTPATIENT
Start: 2021-03-15 | End: 2021-11-07

## 2021-03-15 RX ORDER — MIRTAZAPINE 15 MG/1
15 TABLET, FILM COATED ORAL NIGHTLY
Qty: 90 TABLET | Refills: 1 | Status: SHIPPED | OUTPATIENT
Start: 2021-03-15 | End: 2022-06-10 | Stop reason: ALTCHOICE

## 2021-03-15 RX ORDER — TIZANIDINE 4 MG/1
4 TABLET ORAL EVERY 6 HOURS PRN
Qty: 90 TABLET | Refills: 0 | Status: SHIPPED | OUTPATIENT
Start: 2021-03-15 | End: 2021-12-13 | Stop reason: SDUPTHER

## 2021-03-15 RX ORDER — LOSARTAN POTASSIUM AND HYDROCHLOROTHIAZIDE 25; 100 MG/1; MG/1
1 TABLET ORAL DAILY
Qty: 90 TABLET | Refills: 1 | Status: SHIPPED | OUTPATIENT
Start: 2021-03-15 | End: 2021-11-07

## 2021-03-15 RX ORDER — HYDROXYZINE PAMOATE 25 MG/1
CAPSULE ORAL
COMMUNITY
Start: 2021-02-25 | End: 2021-04-12

## 2021-03-15 RX ORDER — CALCIUM CARBONATE/VITAMIN D3 600 MG-20
1 TABLET,CHEWABLE ORAL ONCE
Qty: 360 TABLET | Refills: 1 | COMMUNITY
Start: 2021-03-15 | End: 2021-03-15

## 2021-03-15 RX ORDER — SERTRALINE HYDROCHLORIDE 100 MG/1
200 TABLET, FILM COATED ORAL DAILY
Qty: 180 TABLET | Refills: 1 | Status: SHIPPED | OUTPATIENT
Start: 2021-03-15 | End: 2021-11-07

## 2021-03-17 ENCOUNTER — PATIENT MESSAGE (OUTPATIENT)
Dept: OPHTHALMOLOGY | Facility: CLINIC | Age: 61
End: 2021-03-17

## 2021-03-17 ENCOUNTER — PATIENT MESSAGE (OUTPATIENT)
Dept: PODIATRY | Facility: CLINIC | Age: 61
End: 2021-03-17

## 2021-03-18 ENCOUNTER — PATIENT OUTREACH (OUTPATIENT)
Dept: ADMINISTRATIVE | Facility: OTHER | Age: 61
End: 2021-03-18

## 2021-03-23 ENCOUNTER — PES CALL (OUTPATIENT)
Dept: ADMINISTRATIVE | Facility: CLINIC | Age: 61
End: 2021-03-23

## 2021-04-05 ENCOUNTER — TELEPHONE (OUTPATIENT)
Dept: ADMINISTRATIVE | Facility: CLINIC | Age: 61
End: 2021-04-05

## 2021-04-06 ENCOUNTER — OFFICE VISIT (OUTPATIENT)
Dept: INTERNAL MEDICINE | Facility: CLINIC | Age: 61
End: 2021-04-06
Payer: MEDICARE

## 2021-04-06 VITALS — WEIGHT: 219 LBS | BODY MASS INDEX: 40.3 KG/M2 | HEIGHT: 62 IN

## 2021-04-06 DIAGNOSIS — E04.2 MULTINODULAR THYROID: ICD-10-CM

## 2021-04-06 DIAGNOSIS — Z87.891 EX-SMOKER: ICD-10-CM

## 2021-04-06 DIAGNOSIS — M47.816 LUMBAR SPONDYLOSIS: ICD-10-CM

## 2021-04-06 DIAGNOSIS — I70.0 AORTIC ATHEROSCLEROSIS: ICD-10-CM

## 2021-04-06 DIAGNOSIS — F33.1 MODERATE EPISODE OF RECURRENT MAJOR DEPRESSIVE DISORDER: ICD-10-CM

## 2021-04-06 DIAGNOSIS — E11.59 HYPERTENSION ASSOCIATED WITH DIABETES: ICD-10-CM

## 2021-04-06 DIAGNOSIS — E78.5 HYPERLIPIDEMIA ASSOCIATED WITH TYPE 2 DIABETES MELLITUS: ICD-10-CM

## 2021-04-06 DIAGNOSIS — K08.109 TEETH MISSING: ICD-10-CM

## 2021-04-06 DIAGNOSIS — F41.1 GAD (GENERALIZED ANXIETY DISORDER): ICD-10-CM

## 2021-04-06 DIAGNOSIS — K21.9 GASTROESOPHAGEAL REFLUX DISEASE, UNSPECIFIED WHETHER ESOPHAGITIS PRESENT: ICD-10-CM

## 2021-04-06 DIAGNOSIS — E11.69 HYPERLIPIDEMIA ASSOCIATED WITH TYPE 2 DIABETES MELLITUS: ICD-10-CM

## 2021-04-06 DIAGNOSIS — E66.01 MORBID OBESITY: ICD-10-CM

## 2021-04-06 DIAGNOSIS — M85.80 OSTEOPENIA, UNSPECIFIED LOCATION: ICD-10-CM

## 2021-04-06 DIAGNOSIS — I51.89 LEFT VENTRICULAR DIASTOLIC DYSFUNCTION WITH PRESERVED SYSTOLIC FUNCTION: ICD-10-CM

## 2021-04-06 DIAGNOSIS — I15.2 HYPERTENSION ASSOCIATED WITH DIABETES: ICD-10-CM

## 2021-04-06 DIAGNOSIS — E11.36 TYPE 2 DIABETES MELLITUS WITH DIABETIC CATARACT, WITHOUT LONG-TERM CURRENT USE OF INSULIN: ICD-10-CM

## 2021-04-06 DIAGNOSIS — F51.01 PRIMARY INSOMNIA: ICD-10-CM

## 2021-04-06 DIAGNOSIS — M51.36 DDD (DEGENERATIVE DISC DISEASE), LUMBAR: ICD-10-CM

## 2021-04-06 DIAGNOSIS — Z00.00 ENCOUNTER FOR PREVENTIVE HEALTH EXAMINATION: Primary | ICD-10-CM

## 2021-04-06 DIAGNOSIS — E55.9 VITAMIN D DEFICIENCY DISEASE: ICD-10-CM

## 2021-04-06 PROBLEM — Z12.31 SCREENING MAMMOGRAM, ENCOUNTER FOR: Status: RESOLVED | Noted: 2021-03-15 | Resolved: 2021-04-06

## 2021-04-06 PROCEDURE — 99499 RISK ADDL DX/OHS AUDIT: ICD-10-PCS | Mod: S$GLB,,, | Performed by: NURSE PRACTITIONER

## 2021-04-06 PROCEDURE — 3044F PR MOST RECENT HEMOGLOBIN A1C LEVEL <7.0%: ICD-10-PCS | Mod: CPTII,S$GLB,, | Performed by: NURSE PRACTITIONER

## 2021-04-06 PROCEDURE — 1125F PR PAIN SEVERITY QUANTIFIED, PAIN PRESENT: ICD-10-PCS | Mod: S$GLB,,, | Performed by: NURSE PRACTITIONER

## 2021-04-06 PROCEDURE — G0439 PR MEDICARE ANNUAL WELLNESS SUBSEQUENT VISIT: ICD-10-PCS | Mod: 95,,, | Performed by: NURSE PRACTITIONER

## 2021-04-06 PROCEDURE — G0439 PPPS, SUBSEQ VISIT: HCPCS | Mod: 95,,, | Performed by: NURSE PRACTITIONER

## 2021-04-06 PROCEDURE — 3044F HG A1C LEVEL LT 7.0%: CPT | Mod: CPTII,S$GLB,, | Performed by: NURSE PRACTITIONER

## 2021-04-06 PROCEDURE — 3008F BODY MASS INDEX DOCD: CPT | Mod: CPTII,S$GLB,, | Performed by: NURSE PRACTITIONER

## 2021-04-06 PROCEDURE — 99499 UNLISTED E&M SERVICE: CPT | Mod: S$GLB,,, | Performed by: NURSE PRACTITIONER

## 2021-04-06 PROCEDURE — 1125F AMNT PAIN NOTED PAIN PRSNT: CPT | Mod: S$GLB,,, | Performed by: NURSE PRACTITIONER

## 2021-04-06 PROCEDURE — 3008F PR BODY MASS INDEX (BMI) DOCUMENTED: ICD-10-PCS | Mod: CPTII,S$GLB,, | Performed by: NURSE PRACTITIONER

## 2021-05-06 ENCOUNTER — PATIENT MESSAGE (OUTPATIENT)
Dept: RESEARCH | Facility: HOSPITAL | Age: 61
End: 2021-05-06

## 2021-09-03 DIAGNOSIS — E11.36 TYPE 2 DIABETES MELLITUS WITH DIABETIC CATARACT, WITHOUT LONG-TERM CURRENT USE OF INSULIN: ICD-10-CM

## 2021-09-03 RX ORDER — METFORMIN HYDROCHLORIDE 500 MG/1
TABLET, EXTENDED RELEASE ORAL
Qty: 60 TABLET | Refills: 0 | Status: SHIPPED | OUTPATIENT
Start: 2021-09-03 | End: 2021-09-06 | Stop reason: SDUPTHER

## 2021-09-03 RX ORDER — ATORVASTATIN CALCIUM 40 MG/1
TABLET, FILM COATED ORAL
Qty: 30 TABLET | Refills: 0 | Status: SHIPPED | OUTPATIENT
Start: 2021-09-03 | End: 2021-09-06 | Stop reason: SDUPTHER

## 2021-09-13 ENCOUNTER — OFFICE VISIT (OUTPATIENT)
Dept: INTERNAL MEDICINE | Facility: CLINIC | Age: 61
End: 2021-09-13
Payer: MEDICARE

## 2021-09-13 ENCOUNTER — HOSPITAL ENCOUNTER (OUTPATIENT)
Dept: RADIOLOGY | Facility: HOSPITAL | Age: 61
Discharge: HOME | End: 2021-09-13
Attending: FAMILY MEDICINE
Payer: MEDICARE

## 2021-09-13 VITALS
SYSTOLIC BLOOD PRESSURE: 124 MMHG | TEMPERATURE: 97 F | HEART RATE: 68 BPM | HEIGHT: 63 IN | DIASTOLIC BLOOD PRESSURE: 82 MMHG | BODY MASS INDEX: 36.64 KG/M2 | WEIGHT: 206.81 LBS

## 2021-09-13 DIAGNOSIS — M25.551 RIGHT HIP PAIN: ICD-10-CM

## 2021-09-13 DIAGNOSIS — M54.9 DORSALGIA, UNSPECIFIED: ICD-10-CM

## 2021-09-13 DIAGNOSIS — Z12.31 SCREENING MAMMOGRAM, ENCOUNTER FOR: ICD-10-CM

## 2021-09-13 DIAGNOSIS — R29.898 RIGHT LEG WEAKNESS: ICD-10-CM

## 2021-09-13 DIAGNOSIS — R10.9 FLANK PAIN: ICD-10-CM

## 2021-09-13 DIAGNOSIS — E11.36 TYPE 2 DIABETES MELLITUS WITH DIABETIC CATARACT, WITHOUT LONG-TERM CURRENT USE OF INSULIN: Primary | ICD-10-CM

## 2021-09-13 DIAGNOSIS — E11.59 HYPERTENSION ASSOCIATED WITH DIABETES: ICD-10-CM

## 2021-09-13 DIAGNOSIS — M25.551 RIGHT HIP PAIN: Primary | ICD-10-CM

## 2021-09-13 DIAGNOSIS — I15.2 HYPERTENSION ASSOCIATED WITH DIABETES: ICD-10-CM

## 2021-09-13 PROBLEM — K57.30 DIVERTICULOSIS OF COLON: Status: RESOLVED | Noted: 2020-01-09 | Resolved: 2021-09-13

## 2021-09-13 PROCEDURE — 3074F PR MOST RECENT SYSTOLIC BLOOD PRESSURE < 130 MM HG: ICD-10-PCS | Mod: HCNC,CPTII,S$GLB, | Performed by: FAMILY MEDICINE

## 2021-09-13 PROCEDURE — 72100 XR LUMBAR SPINE 2 OR 3 VIEWS: ICD-10-PCS | Mod: 26,HCNC,, | Performed by: RADIOLOGY

## 2021-09-13 PROCEDURE — 99999 PR PBB SHADOW E&M-EST. PATIENT-LVL V: ICD-10-PCS | Mod: PBBFAC,,, | Performed by: FAMILY MEDICINE

## 2021-09-13 PROCEDURE — 3079F DIAST BP 80-89 MM HG: CPT | Mod: HCNC,CPTII,S$GLB, | Performed by: FAMILY MEDICINE

## 2021-09-13 PROCEDURE — 3008F PR BODY MASS INDEX (BMI) DOCUMENTED: ICD-10-PCS | Mod: HCNC,CPTII,S$GLB, | Performed by: FAMILY MEDICINE

## 2021-09-13 PROCEDURE — 99214 OFFICE O/P EST MOD 30 MIN: CPT | Mod: HCNC,25,S$GLB, | Performed by: FAMILY MEDICINE

## 2021-09-13 PROCEDURE — 1160F RVW MEDS BY RX/DR IN RCRD: CPT | Mod: HCNC,CPTII,S$GLB, | Performed by: FAMILY MEDICINE

## 2021-09-13 PROCEDURE — 1160F PR REVIEW ALL MEDS BY PRESCRIBER/CLIN PHARMACIST DOCUMENTED: ICD-10-PCS | Mod: HCNC,CPTII,S$GLB, | Performed by: FAMILY MEDICINE

## 2021-09-13 PROCEDURE — 99499 RISK ADDL DX/OHS AUDIT: ICD-10-PCS | Mod: HCNC,S$GLB,, | Performed by: FAMILY MEDICINE

## 2021-09-13 PROCEDURE — 1159F MED LIST DOCD IN RCRD: CPT | Mod: HCNC,CPTII,S$GLB, | Performed by: FAMILY MEDICINE

## 2021-09-13 PROCEDURE — 3074F SYST BP LT 130 MM HG: CPT | Mod: HCNC,CPTII,S$GLB, | Performed by: FAMILY MEDICINE

## 2021-09-13 PROCEDURE — 81003 URINALYSIS AUTO W/O SCOPE: CPT | Mod: HCNC | Performed by: FAMILY MEDICINE

## 2021-09-13 PROCEDURE — 3066F PR DOCUMENTATION OF TREATMENT FOR NEPHROPATHY: ICD-10-PCS | Mod: HCNC,CPTII,S$GLB, | Performed by: FAMILY MEDICINE

## 2021-09-13 PROCEDURE — 96372 PR INJECTION,THERAP/PROPH/DIAG2ST, IM OR SUBCUT: ICD-10-PCS | Mod: HCNC,S$GLB,, | Performed by: FAMILY MEDICINE

## 2021-09-13 PROCEDURE — 99499 UNLISTED E&M SERVICE: CPT | Mod: HCNC,S$GLB,, | Performed by: FAMILY MEDICINE

## 2021-09-13 PROCEDURE — 3044F PR MOST RECENT HEMOGLOBIN A1C LEVEL <7.0%: ICD-10-PCS | Mod: HCNC,CPTII,S$GLB, | Performed by: FAMILY MEDICINE

## 2021-09-13 PROCEDURE — 96372 THER/PROPH/DIAG INJ SC/IM: CPT | Mod: HCNC,S$GLB,, | Performed by: FAMILY MEDICINE

## 2021-09-13 PROCEDURE — 99214 PR OFFICE/OUTPT VISIT, EST, LEVL IV, 30-39 MIN: ICD-10-PCS | Mod: HCNC,25,S$GLB, | Performed by: FAMILY MEDICINE

## 2021-09-13 PROCEDURE — 3066F NEPHROPATHY DOC TX: CPT | Mod: HCNC,CPTII,S$GLB, | Performed by: FAMILY MEDICINE

## 2021-09-13 PROCEDURE — 73502 XR HIP WITH PELVIS WHEN PERFORMED, 2 OR 3  VIEWS RIGHT: ICD-10-PCS | Mod: 26,HCNC,RT, | Performed by: RADIOLOGY

## 2021-09-13 PROCEDURE — 72100 X-RAY EXAM L-S SPINE 2/3 VWS: CPT | Mod: 26,HCNC,, | Performed by: RADIOLOGY

## 2021-09-13 PROCEDURE — 3008F BODY MASS INDEX DOCD: CPT | Mod: HCNC,CPTII,S$GLB, | Performed by: FAMILY MEDICINE

## 2021-09-13 PROCEDURE — 73502 X-RAY EXAM HIP UNI 2-3 VIEWS: CPT | Mod: TC,HCNC,FY,PO,RT

## 2021-09-13 PROCEDURE — 1159F PR MEDICATION LIST DOCUMENTED IN MEDICAL RECORD: ICD-10-PCS | Mod: HCNC,CPTII,S$GLB, | Performed by: FAMILY MEDICINE

## 2021-09-13 PROCEDURE — 99999 PR PBB SHADOW E&M-EST. PATIENT-LVL V: CPT | Mod: PBBFAC,,, | Performed by: FAMILY MEDICINE

## 2021-09-13 PROCEDURE — 3061F NEG MICROALBUMINURIA REV: CPT | Mod: HCNC,CPTII,S$GLB, | Performed by: FAMILY MEDICINE

## 2021-09-13 PROCEDURE — 73502 X-RAY EXAM HIP UNI 2-3 VIEWS: CPT | Mod: 26,HCNC,RT, | Performed by: RADIOLOGY

## 2021-09-13 PROCEDURE — 3079F PR MOST RECENT DIASTOLIC BLOOD PRESSURE 80-89 MM HG: ICD-10-PCS | Mod: HCNC,CPTII,S$GLB, | Performed by: FAMILY MEDICINE

## 2021-09-13 PROCEDURE — 72100 X-RAY EXAM L-S SPINE 2/3 VWS: CPT | Mod: TC,HCNC,FY,PO

## 2021-09-13 PROCEDURE — 3044F HG A1C LEVEL LT 7.0%: CPT | Mod: HCNC,CPTII,S$GLB, | Performed by: FAMILY MEDICINE

## 2021-09-13 PROCEDURE — 3061F PR NEG MICROALBUMINURIA RESULT DOCUMENTED/REVIEW: ICD-10-PCS | Mod: HCNC,CPTII,S$GLB, | Performed by: FAMILY MEDICINE

## 2021-09-13 RX ORDER — ORPHENADRINE CITRATE 30 MG/ML
60 INJECTION INTRAMUSCULAR; INTRAVENOUS
Status: COMPLETED | OUTPATIENT
Start: 2021-09-13 | End: 2021-09-13

## 2021-09-13 RX ORDER — ONDANSETRON 4 MG/1
8 TABLET, ORALLY DISINTEGRATING ORAL EVERY 8 HOURS PRN
Qty: 30 TABLET | Refills: 0 | Status: SHIPPED | OUTPATIENT
Start: 2021-09-13 | End: 2022-08-17

## 2021-09-13 RX ADMIN — ORPHENADRINE CITRATE 60 MG: 30 INJECTION INTRAMUSCULAR; INTRAVENOUS at 12:09

## 2021-09-14 ENCOUNTER — TELEPHONE (OUTPATIENT)
Dept: INTERNAL MEDICINE | Facility: CLINIC | Age: 61
End: 2021-09-14

## 2021-09-14 ENCOUNTER — TELEPHONE (OUTPATIENT)
Dept: ORTHOPEDICS | Facility: CLINIC | Age: 61
End: 2021-09-14

## 2021-09-14 LAB
BILIRUB UR QL STRIP: NEGATIVE
CLARITY UR REFRACT.AUTO: CLEAR
COLOR UR AUTO: YELLOW
GLUCOSE UR QL STRIP: NEGATIVE
HGB UR QL STRIP: NEGATIVE
KETONES UR QL STRIP: NEGATIVE
LEUKOCYTE ESTERASE UR QL STRIP: NEGATIVE
NITRITE UR QL STRIP: NEGATIVE
PH UR STRIP: 7 [PH] (ref 5–8)
PROT UR QL STRIP: NEGATIVE
SP GR UR STRIP: 1.01 (ref 1–1.03)
URN SPEC COLLECT METH UR: NORMAL

## 2021-09-16 ENCOUNTER — TELEPHONE (OUTPATIENT)
Dept: ORTHOPEDICS | Facility: CLINIC | Age: 61
End: 2021-09-16

## 2021-09-23 ENCOUNTER — HOSPITAL ENCOUNTER (OUTPATIENT)
Dept: RADIOLOGY | Facility: HOSPITAL | Age: 61
Discharge: HOME | End: 2021-09-23
Attending: FAMILY MEDICINE
Payer: MEDICARE

## 2021-09-23 ENCOUNTER — PATIENT OUTREACH (OUTPATIENT)
Dept: ADMINISTRATIVE | Facility: OTHER | Age: 61
End: 2021-09-23

## 2021-09-23 DIAGNOSIS — Z12.31 SCREENING MAMMOGRAM, ENCOUNTER FOR: ICD-10-CM

## 2021-09-23 PROCEDURE — 77063 MAMMO DIGITAL SCREENING BILAT WITH TOMO: ICD-10-PCS | Mod: 26,HCNC,, | Performed by: RADIOLOGY

## 2021-09-23 PROCEDURE — 77067 SCR MAMMO BI INCL CAD: CPT | Mod: TC,HCNC

## 2021-09-23 PROCEDURE — 77067 MAMMO DIGITAL SCREENING BILAT WITH TOMO: ICD-10-PCS | Mod: 26,HCNC,, | Performed by: RADIOLOGY

## 2021-09-23 PROCEDURE — 77067 SCR MAMMO BI INCL CAD: CPT | Mod: 26,HCNC,, | Performed by: RADIOLOGY

## 2021-09-23 PROCEDURE — 77063 BREAST TOMOSYNTHESIS BI: CPT | Mod: 26,HCNC,, | Performed by: RADIOLOGY

## 2021-09-24 ENCOUNTER — OFFICE VISIT (OUTPATIENT)
Dept: PODIATRY | Facility: CLINIC | Age: 61
End: 2021-09-24
Payer: MEDICARE

## 2021-09-24 ENCOUNTER — OFFICE VISIT (OUTPATIENT)
Dept: OPHTHALMOLOGY | Facility: CLINIC | Age: 61
End: 2021-09-24
Payer: MEDICARE

## 2021-09-24 DIAGNOSIS — E11.36 TYPE 2 DIABETES MELLITUS WITH DIABETIC CATARACT, WITHOUT LONG-TERM CURRENT USE OF INSULIN: ICD-10-CM

## 2021-09-24 DIAGNOSIS — E11.9 DIABETES MELLITUS WITHOUT COMPLICATION: Primary | ICD-10-CM

## 2021-09-24 DIAGNOSIS — E11.36 DIABETIC CATARACT: ICD-10-CM

## 2021-09-24 DIAGNOSIS — H52.13 MYOPIA OF BOTH EYES: ICD-10-CM

## 2021-09-24 PROCEDURE — 1160F PR REVIEW ALL MEDS BY PRESCRIBER/CLIN PHARMACIST DOCUMENTED: ICD-10-PCS | Mod: HCNC,CPTII,S$GLB, | Performed by: PODIATRIST

## 2021-09-24 PROCEDURE — 1159F MED LIST DOCD IN RCRD: CPT | Mod: HCNC,CPTII,S$GLB, | Performed by: PODIATRIST

## 2021-09-24 PROCEDURE — 3044F HG A1C LEVEL LT 7.0%: CPT | Mod: HCNC,CPTII,S$GLB, | Performed by: PODIATRIST

## 2021-09-24 PROCEDURE — 3044F PR MOST RECENT HEMOGLOBIN A1C LEVEL <7.0%: ICD-10-PCS | Mod: HCNC,CPTII,S$GLB, | Performed by: PODIATRIST

## 2021-09-24 PROCEDURE — 99999 PR PBB SHADOW E&M-EST. PATIENT-LVL III: CPT | Mod: PBBFAC,HCNC,, | Performed by: PODIATRIST

## 2021-09-24 PROCEDURE — 3061F PR NEG MICROALBUMINURIA RESULT DOCUMENTED/REVIEW: ICD-10-PCS | Mod: HCNC,CPTII,S$GLB, | Performed by: PODIATRIST

## 2021-09-24 PROCEDURE — 1159F PR MEDICATION LIST DOCUMENTED IN MEDICAL RECORD: ICD-10-PCS | Mod: HCNC,CPTII,S$GLB, | Performed by: PODIATRIST

## 2021-09-24 PROCEDURE — 3066F PR DOCUMENTATION OF TREATMENT FOR NEPHROPATHY: ICD-10-PCS | Mod: HCNC,CPTII,S$GLB, | Performed by: PODIATRIST

## 2021-09-24 PROCEDURE — 99999 PR PBB SHADOW E&M-EST. PATIENT-LVL III: ICD-10-PCS | Mod: PBBFAC,HCNC,, | Performed by: OPTOMETRIST

## 2021-09-24 PROCEDURE — 92015 PR REFRACTION: ICD-10-PCS | Mod: HCNC,S$GLB,, | Performed by: OPTOMETRIST

## 2021-09-24 PROCEDURE — 3061F NEG MICROALBUMINURIA REV: CPT | Mod: HCNC,CPTII,S$GLB, | Performed by: PODIATRIST

## 2021-09-24 PROCEDURE — 3066F NEPHROPATHY DOC TX: CPT | Mod: HCNC,CPTII,S$GLB, | Performed by: PODIATRIST

## 2021-09-24 PROCEDURE — 92015 DETERMINE REFRACTIVE STATE: CPT | Mod: HCNC,S$GLB,, | Performed by: OPTOMETRIST

## 2021-09-24 PROCEDURE — 1160F RVW MEDS BY RX/DR IN RCRD: CPT | Mod: HCNC,CPTII,S$GLB, | Performed by: PODIATRIST

## 2021-09-24 PROCEDURE — 99999 PR PBB SHADOW E&M-EST. PATIENT-LVL III: CPT | Mod: PBBFAC,HCNC,, | Performed by: OPTOMETRIST

## 2021-09-24 PROCEDURE — 99203 OFFICE O/P NEW LOW 30 MIN: CPT | Mod: HCNC,S$GLB,, | Performed by: PODIATRIST

## 2021-09-24 PROCEDURE — 99999 PR PBB SHADOW E&M-EST. PATIENT-LVL III: ICD-10-PCS | Mod: PBBFAC,HCNC,, | Performed by: PODIATRIST

## 2021-09-24 PROCEDURE — 92014 COMPRE OPH EXAM EST PT 1/>: CPT | Mod: HCNC,S$GLB,, | Performed by: OPTOMETRIST

## 2021-09-24 PROCEDURE — 99203 PR OFFICE/OUTPT VISIT, NEW, LEVL III, 30-44 MIN: ICD-10-PCS | Mod: HCNC,S$GLB,, | Performed by: PODIATRIST

## 2021-09-24 PROCEDURE — 92014 PR EYE EXAM, EST PATIENT,COMPREHESV: ICD-10-PCS | Mod: HCNC,S$GLB,, | Performed by: OPTOMETRIST

## 2021-11-02 ENCOUNTER — TELEPHONE (OUTPATIENT)
Dept: ORTHOPEDICS | Facility: CLINIC | Age: 61
End: 2021-11-02
Payer: MEDICARE

## 2021-12-02 ENCOUNTER — PATIENT MESSAGE (OUTPATIENT)
Dept: INTERNAL MEDICINE | Facility: CLINIC | Age: 61
End: 2021-12-02
Payer: MEDICARE

## 2021-12-13 ENCOUNTER — OFFICE VISIT (OUTPATIENT)
Dept: CARDIOLOGY | Facility: CLINIC | Age: 61
End: 2021-12-13
Payer: MEDICARE

## 2021-12-13 ENCOUNTER — OFFICE VISIT (OUTPATIENT)
Dept: INTERNAL MEDICINE | Facility: CLINIC | Age: 61
End: 2021-12-13
Payer: MEDICARE

## 2021-12-13 ENCOUNTER — HOSPITAL ENCOUNTER (OUTPATIENT)
Dept: RADIOLOGY | Facility: HOSPITAL | Age: 61
Discharge: HOME | End: 2021-12-13
Attending: FAMILY MEDICINE
Payer: MEDICARE

## 2021-12-13 ENCOUNTER — TELEPHONE (OUTPATIENT)
Dept: INTERNAL MEDICINE | Facility: CLINIC | Age: 61
End: 2021-12-13

## 2021-12-13 ENCOUNTER — HOSPITAL ENCOUNTER (OUTPATIENT)
Dept: CARDIOLOGY | Facility: HOSPITAL | Age: 61
Discharge: HOME | End: 2021-12-13
Attending: INTERNAL MEDICINE
Payer: MEDICARE

## 2021-12-13 VITALS
WEIGHT: 194.25 LBS | TEMPERATURE: 98 F | HEART RATE: 78 BPM | SYSTOLIC BLOOD PRESSURE: 136 MMHG | BODY MASS INDEX: 34.42 KG/M2 | DIASTOLIC BLOOD PRESSURE: 86 MMHG | HEIGHT: 63 IN

## 2021-12-13 VITALS
BODY MASS INDEX: 34.38 KG/M2 | OXYGEN SATURATION: 95 % | HEART RATE: 89 BPM | RESPIRATION RATE: 16 BRPM | DIASTOLIC BLOOD PRESSURE: 80 MMHG | HEIGHT: 63 IN | SYSTOLIC BLOOD PRESSURE: 135 MMHG | WEIGHT: 194 LBS

## 2021-12-13 DIAGNOSIS — R07.89 OTHER CHEST PAIN: ICD-10-CM

## 2021-12-13 DIAGNOSIS — I15.2 HYPERTENSION ASSOCIATED WITH DIABETES: ICD-10-CM

## 2021-12-13 DIAGNOSIS — E11.59 HYPERTENSION ASSOCIATED WITH DIABETES: ICD-10-CM

## 2021-12-13 DIAGNOSIS — I51.89 LEFT VENTRICULAR DIASTOLIC DYSFUNCTION WITH PRESERVED SYSTOLIC FUNCTION: ICD-10-CM

## 2021-12-13 DIAGNOSIS — E78.5 HYPERLIPIDEMIA ASSOCIATED WITH TYPE 2 DIABETES MELLITUS: ICD-10-CM

## 2021-12-13 DIAGNOSIS — E11.69 HYPERLIPIDEMIA ASSOCIATED WITH TYPE 2 DIABETES MELLITUS: Primary | ICD-10-CM

## 2021-12-13 DIAGNOSIS — K63.5 POLYP OF COLON, UNSPECIFIED PART OF COLON, UNSPECIFIED TYPE: ICD-10-CM

## 2021-12-13 DIAGNOSIS — E11.69 HYPERLIPIDEMIA ASSOCIATED WITH TYPE 2 DIABETES MELLITUS: ICD-10-CM

## 2021-12-13 DIAGNOSIS — R32 URINARY INCONTINENCE, UNSPECIFIED TYPE: ICD-10-CM

## 2021-12-13 DIAGNOSIS — I70.0 AORTIC ATHEROSCLEROSIS: ICD-10-CM

## 2021-12-13 DIAGNOSIS — E66.01 MORBID OBESITY: ICD-10-CM

## 2021-12-13 DIAGNOSIS — H60.90 OTITIS EXTERNA, UNSPECIFIED CHRONICITY, UNSPECIFIED LATERALITY, UNSPECIFIED TYPE: ICD-10-CM

## 2021-12-13 DIAGNOSIS — E78.5 HYPERLIPIDEMIA ASSOCIATED WITH TYPE 2 DIABETES MELLITUS: Primary | ICD-10-CM

## 2021-12-13 DIAGNOSIS — Z87.891 EX-SMOKER: ICD-10-CM

## 2021-12-13 DIAGNOSIS — I15.2 HYPERTENSION ASSOCIATED WITH DIABETES: Primary | ICD-10-CM

## 2021-12-13 DIAGNOSIS — F33.1 MODERATE EPISODE OF RECURRENT MAJOR DEPRESSIVE DISORDER: ICD-10-CM

## 2021-12-13 DIAGNOSIS — F17.200 TOBACCO DEPENDENCE: ICD-10-CM

## 2021-12-13 DIAGNOSIS — E11.36 TYPE 2 DIABETES MELLITUS WITH DIABETIC CATARACT, WITHOUT LONG-TERM CURRENT USE OF INSULIN: Primary | ICD-10-CM

## 2021-12-13 DIAGNOSIS — R06.09 DOE (DYSPNEA ON EXERTION): ICD-10-CM

## 2021-12-13 DIAGNOSIS — E11.59 HYPERTENSION ASSOCIATED WITH DIABETES: Primary | ICD-10-CM

## 2021-12-13 DIAGNOSIS — E04.2 MULTINODULAR THYROID: ICD-10-CM

## 2021-12-13 PROBLEM — R10.9 FLANK PAIN: Status: RESOLVED | Noted: 2021-09-13 | Resolved: 2021-12-13

## 2021-12-13 PROBLEM — Z86.010 HISTORY OF COLON POLYPS: Status: RESOLVED | Noted: 2019-11-06 | Resolved: 2021-12-13

## 2021-12-13 PROBLEM — Z86.0100 HISTORY OF COLON POLYPS: Status: RESOLVED | Noted: 2019-11-06 | Resolved: 2021-12-13

## 2021-12-13 PROCEDURE — 99499 RISK ADDL DX/OHS AUDIT: ICD-10-PCS | Mod: HCNC,S$GLB,, | Performed by: INTERNAL MEDICINE

## 2021-12-13 PROCEDURE — 3061F PR NEG MICROALBUMINURIA RESULT DOCUMENTED/REVIEW: ICD-10-PCS | Mod: HCNC,CPTII,S$GLB, | Performed by: FAMILY MEDICINE

## 2021-12-13 PROCEDURE — 76536 US EXAM OF HEAD AND NECK: CPT | Mod: TC,HCNC

## 2021-12-13 PROCEDURE — 3061F PR NEG MICROALBUMINURIA RESULT DOCUMENTED/REVIEW: ICD-10-PCS | Mod: HCNC,CPTII,S$GLB, | Performed by: INTERNAL MEDICINE

## 2021-12-13 PROCEDURE — 99499 UNLISTED E&M SERVICE: CPT | Mod: S$GLB,,, | Performed by: FAMILY MEDICINE

## 2021-12-13 PROCEDURE — 3066F PR DOCUMENTATION OF TREATMENT FOR NEPHROPATHY: ICD-10-PCS | Mod: HCNC,CPTII,S$GLB, | Performed by: INTERNAL MEDICINE

## 2021-12-13 PROCEDURE — 99999 PR PBB SHADOW E&M-EST. PATIENT-LVL IV: CPT | Mod: PBBFAC,HCNC,, | Performed by: FAMILY MEDICINE

## 2021-12-13 PROCEDURE — 99999 PR PBB SHADOW E&M-EST. PATIENT-LVL IV: CPT | Mod: PBBFAC,HCNC,, | Performed by: INTERNAL MEDICINE

## 2021-12-13 PROCEDURE — 93010 ELECTROCARDIOGRAM REPORT: CPT | Mod: HCNC,,, | Performed by: INTERNAL MEDICINE

## 2021-12-13 PROCEDURE — 3066F NEPHROPATHY DOC TX: CPT | Mod: HCNC,CPTII,S$GLB, | Performed by: FAMILY MEDICINE

## 2021-12-13 PROCEDURE — 3061F NEG MICROALBUMINURIA REV: CPT | Mod: HCNC,CPTII,S$GLB, | Performed by: INTERNAL MEDICINE

## 2021-12-13 PROCEDURE — 93010 EKG 12-LEAD: ICD-10-PCS | Mod: HCNC,,, | Performed by: INTERNAL MEDICINE

## 2021-12-13 PROCEDURE — 99499 RISK ADDL DX/OHS AUDIT: ICD-10-PCS | Mod: S$GLB,,, | Performed by: FAMILY MEDICINE

## 2021-12-13 PROCEDURE — 99205 PR OFFICE/OUTPT VISIT, NEW, LEVL V, 60-74 MIN: ICD-10-PCS | Mod: HCNC,S$GLB,, | Performed by: INTERNAL MEDICINE

## 2021-12-13 PROCEDURE — 76536 US SOFT TISSUE HEAD NECK THYROID: ICD-10-PCS | Mod: 26,HCNC,, | Performed by: RADIOLOGY

## 2021-12-13 PROCEDURE — 99205 OFFICE O/P NEW HI 60 MIN: CPT | Mod: HCNC,S$GLB,, | Performed by: INTERNAL MEDICINE

## 2021-12-13 PROCEDURE — 3066F PR DOCUMENTATION OF TREATMENT FOR NEPHROPATHY: ICD-10-PCS | Mod: HCNC,CPTII,S$GLB, | Performed by: FAMILY MEDICINE

## 2021-12-13 PROCEDURE — 99999 PR PBB SHADOW E&M-EST. PATIENT-LVL IV: ICD-10-PCS | Mod: PBBFAC,HCNC,, | Performed by: FAMILY MEDICINE

## 2021-12-13 PROCEDURE — 99499 UNLISTED E&M SERVICE: CPT | Mod: HCNC,S$GLB,, | Performed by: INTERNAL MEDICINE

## 2021-12-13 PROCEDURE — 99999 PR PBB SHADOW E&M-EST. PATIENT-LVL IV: ICD-10-PCS | Mod: PBBFAC,HCNC,, | Performed by: INTERNAL MEDICINE

## 2021-12-13 PROCEDURE — 3061F NEG MICROALBUMINURIA REV: CPT | Mod: HCNC,CPTII,S$GLB, | Performed by: FAMILY MEDICINE

## 2021-12-13 PROCEDURE — 3066F NEPHROPATHY DOC TX: CPT | Mod: HCNC,CPTII,S$GLB, | Performed by: INTERNAL MEDICINE

## 2021-12-13 PROCEDURE — 76536 US EXAM OF HEAD AND NECK: CPT | Mod: 26,HCNC,, | Performed by: RADIOLOGY

## 2021-12-13 PROCEDURE — 99214 PR OFFICE/OUTPT VISIT, EST, LEVL IV, 30-39 MIN: ICD-10-PCS | Mod: HCNC,S$GLB,, | Performed by: FAMILY MEDICINE

## 2021-12-13 PROCEDURE — 93005 ELECTROCARDIOGRAM TRACING: CPT | Mod: HCNC,PO

## 2021-12-13 PROCEDURE — 99214 OFFICE O/P EST MOD 30 MIN: CPT | Mod: HCNC,S$GLB,, | Performed by: FAMILY MEDICINE

## 2021-12-13 RX ORDER — TIZANIDINE 4 MG/1
4 TABLET ORAL EVERY 6 HOURS PRN
Qty: 90 TABLET | Refills: 0 | Status: SHIPPED | OUTPATIENT
Start: 2021-12-13 | End: 2022-01-03

## 2021-12-13 RX ORDER — PANTOPRAZOLE SODIUM 40 MG/1
40 TABLET, DELAYED RELEASE ORAL DAILY
Qty: 90 TABLET | Refills: 1 | Status: SHIPPED | OUTPATIENT
Start: 2021-12-13 | End: 2022-06-10 | Stop reason: SDUPTHER

## 2021-12-13 RX ORDER — METFORMIN HYDROCHLORIDE 500 MG/1
500 TABLET, EXTENDED RELEASE ORAL 2 TIMES DAILY WITH MEALS
Qty: 180 TABLET | Refills: 1 | Status: SHIPPED | OUTPATIENT
Start: 2021-12-13 | End: 2022-06-10 | Stop reason: SDUPTHER

## 2021-12-13 RX ORDER — ATORVASTATIN CALCIUM 40 MG/1
40 TABLET, FILM COATED ORAL DAILY
Qty: 90 TABLET | Refills: 1 | Status: SHIPPED | OUTPATIENT
Start: 2021-12-13 | End: 2022-06-10 | Stop reason: SDUPTHER

## 2021-12-13 RX ORDER — MUPIROCIN 20 MG/G
OINTMENT TOPICAL 3 TIMES DAILY
Qty: 30 G | Refills: 0 | Status: SHIPPED | OUTPATIENT
Start: 2021-12-13 | End: 2022-08-17

## 2021-12-13 RX ORDER — LOSARTAN POTASSIUM AND HYDROCHLOROTHIAZIDE 25; 100 MG/1; MG/1
1 TABLET ORAL DAILY
Qty: 90 TABLET | Refills: 1 | Status: SHIPPED | OUTPATIENT
Start: 2021-12-13 | End: 2022-06-10 | Stop reason: SDUPTHER

## 2021-12-13 RX ORDER — SERTRALINE HYDROCHLORIDE 100 MG/1
200 TABLET, FILM COATED ORAL DAILY
Qty: 180 TABLET | Refills: 1 | Status: SHIPPED | OUTPATIENT
Start: 2021-12-13 | End: 2022-04-28

## 2021-12-14 ENCOUNTER — TELEPHONE (OUTPATIENT)
Dept: INTERNAL MEDICINE | Facility: CLINIC | Age: 61
End: 2021-12-14
Payer: MEDICARE

## 2022-01-11 ENCOUNTER — OFFICE VISIT (OUTPATIENT)
Dept: UROLOGY | Facility: CLINIC | Age: 62
End: 2022-01-11
Payer: MEDICARE

## 2022-01-11 VITALS — WEIGHT: 192 LBS | BODY MASS INDEX: 34.01 KG/M2 | DIASTOLIC BLOOD PRESSURE: 90 MMHG | SYSTOLIC BLOOD PRESSURE: 140 MMHG

## 2022-01-11 DIAGNOSIS — N39.46 MIXED INCONTINENCE: Primary | ICD-10-CM

## 2022-01-11 DIAGNOSIS — R32 URINARY INCONTINENCE, UNSPECIFIED TYPE: ICD-10-CM

## 2022-01-11 DIAGNOSIS — N20.0 KIDNEY STONES: ICD-10-CM

## 2022-01-11 LAB
BILIRUB SERPL-MCNC: NORMAL MG/DL
BLOOD URINE, POC: NORMAL
CLARITY, POC UA: CLEAR
COLOR, POC UA: YELLOW
GLUCOSE UR QL STRIP: NORMAL
KETONES UR QL STRIP: NORMAL
LEUKOCYTE ESTERASE URINE, POC: NORMAL
NITRITE, POC UA: NORMAL
PH, POC UA: 7
PROTEIN, POC: NORMAL
SPECIFIC GRAVITY, POC UA: 1.01
UROBILINOGEN, POC UA: NORMAL

## 2022-01-11 PROCEDURE — 3008F BODY MASS INDEX DOCD: CPT | Mod: HCNC,CPTII,S$GLB, | Performed by: UROLOGY

## 2022-01-11 PROCEDURE — 3008F PR BODY MASS INDEX (BMI) DOCUMENTED: ICD-10-PCS | Mod: HCNC,CPTII,S$GLB, | Performed by: UROLOGY

## 2022-01-11 PROCEDURE — 81002 URINALYSIS NONAUTO W/O SCOPE: CPT | Mod: HCNC,S$GLB,, | Performed by: UROLOGY

## 2022-01-11 PROCEDURE — 3077F SYST BP >= 140 MM HG: CPT | Mod: HCNC,CPTII,S$GLB, | Performed by: UROLOGY

## 2022-01-11 PROCEDURE — 3080F DIAST BP >= 90 MM HG: CPT | Mod: HCNC,CPTII,S$GLB, | Performed by: UROLOGY

## 2022-01-11 PROCEDURE — 99999 PR PBB SHADOW E&M-EST. PATIENT-LVL IV: ICD-10-PCS | Mod: PBBFAC,HCNC,, | Performed by: UROLOGY

## 2022-01-11 PROCEDURE — 3080F PR MOST RECENT DIASTOLIC BLOOD PRESSURE >= 90 MM HG: ICD-10-PCS | Mod: HCNC,CPTII,S$GLB, | Performed by: UROLOGY

## 2022-01-11 PROCEDURE — 99204 PR OFFICE/OUTPT VISIT, NEW, LEVL IV, 45-59 MIN: ICD-10-PCS | Mod: HCNC,S$GLB,, | Performed by: UROLOGY

## 2022-01-11 PROCEDURE — 81002 POCT URINE DIPSTICK WITHOUT MICROSCOPE: ICD-10-PCS | Mod: HCNC,S$GLB,, | Performed by: UROLOGY

## 2022-01-11 PROCEDURE — 99999 PR PBB SHADOW E&M-EST. PATIENT-LVL IV: CPT | Mod: PBBFAC,HCNC,, | Performed by: UROLOGY

## 2022-01-11 PROCEDURE — 3072F LOW RISK FOR RETINOPATHY: CPT | Mod: HCNC,CPTII,S$GLB, | Performed by: UROLOGY

## 2022-01-11 PROCEDURE — 1159F PR MEDICATION LIST DOCUMENTED IN MEDICAL RECORD: ICD-10-PCS | Mod: HCNC,CPTII,S$GLB, | Performed by: UROLOGY

## 2022-01-11 PROCEDURE — 3072F PR LOW RISK FOR RETINOPATHY: ICD-10-PCS | Mod: HCNC,CPTII,S$GLB, | Performed by: UROLOGY

## 2022-01-11 PROCEDURE — 3077F PR MOST RECENT SYSTOLIC BLOOD PRESSURE >= 140 MM HG: ICD-10-PCS | Mod: HCNC,CPTII,S$GLB, | Performed by: UROLOGY

## 2022-01-11 PROCEDURE — 1159F MED LIST DOCD IN RCRD: CPT | Mod: HCNC,CPTII,S$GLB, | Performed by: UROLOGY

## 2022-01-11 PROCEDURE — 99204 OFFICE O/P NEW MOD 45 MIN: CPT | Mod: HCNC,S$GLB,, | Performed by: UROLOGY

## 2022-01-11 RX ORDER — OXYBUTYNIN CHLORIDE 10 MG/1
10 TABLET, EXTENDED RELEASE ORAL DAILY
Qty: 30 TABLET | Refills: 11 | Status: SHIPPED | OUTPATIENT
Start: 2022-01-11 | End: 2023-03-09 | Stop reason: SDUPTHER

## 2022-01-11 NOTE — PROGRESS NOTES
Chief Complaint   Patient presents with    Other     Urinary incontinence, maybe kidney stones, pt has kidney stones history.        Referring Provider: Dr. Teofilo Morton      History of Present Illness:   Melita Romero is a 61 y.o. female here for evaluation of Other (Urinary incontinence, maybe kidney stones, pt has kidney stones history. )  Pt reports h/o urolithiasis. She does take chronic pain medication, and states that she may not feel the pain. However, she does report sometimes she has bladder pain and right flank pain. She also has ADDY with sneeze, cough, run and laugh. She wears 2 pads per day. She does have some UUI first thing in the am. Incontinence has been ongoing for a year or so. Maybe more often now. She reports that a long time ago, she was on a bladder medication, but stopped it. She states that it did help. UUI>ADDY. No gross hematuria. Sometimes she has frequency up to 50 times a day.         Review of Systems   Constitutional: Negative for chills and fever.   Respiratory: Negative for shortness of breath.    Cardiovascular: Negative for chest pain.   Gastrointestinal: Negative for constipation and diarrhea.   Genitourinary: Negative for difficulty urinating.   All other systems reviewed and are negative.      Past Medical History:   Diagnosis Date    Abnormal glucose     Anxiety     Arthritis     Back pain     Controlled type 2 diabetes mellitus without complication, without long-term current use of insulin 2/27/2017    DDD (degenerative disc disease), lumbar     Depression     Diverticulosis of colon 1/9/2020    Fibromyalgia     Flank pain 9/13/2021    History of colon polyps 11/6/2019    Hyperlipidemia     Hypertension     Thyroid disease     Tobacco dependence     Trouble in sleeping     Urinary incontinence     wears pads daily       Past Surgical History:   Procedure Laterality Date    COLONOSCOPY N/A 1/9/2020    Procedure: COLONOSCOPY;  Surgeon: Jacob ADAMS  MD Leonidas;  Location: [facility];  Service: Endoscopy;  Laterality: N/A;    HYSTERECTOMY  2003    complete    OOPHORECTOMY Bilateral 2003    SHOULDER ARTHROSCOPY Right 05/16/2017    Dr. Nima Coyne    TONSILLECTOMY         Family History   Problem Relation Age of Onset    Glaucoma Mother     Diabetes Mother     Hypertension Brother     Diabetes Brother     Heart disease Maternal Uncle        Social History     Tobacco Use    Smoking status: Current Every Day Smoker     Packs/day: 0.50     Years: 25.00     Pack years: 12.50    Smokeless tobacco: Never Used   Substance Use Topics    Alcohol use: Yes     Alcohol/week: 0.0 standard drinks     Comment: rarely glass of wine    Drug use: No       Current Outpatient Medications   Medication Sig Dispense Refill    aspirin 81 mg Tab Take 1 tablet by mouth Daily.      atorvastatin (LIPITOR) 40 MG tablet Take 1 tablet (40 mg total) by mouth once daily. 90 tablet 1    cholecalciferol, vitamin D3, 2,000 unit Tab Take 1 tablet by mouth Daily.      hydrocodone-acetaminophen 10-325mg (NORCO)  mg Tab Take 1 tablet by mouth every 8 (eight) hours as needed. 75 tablet 0    hydrOXYzine pamoate (VISTARIL) 25 MG Cap TAKE 1 CAPSULE BY MOUTH EVERY 8 (EIGHT) HOURS AS NEEDED. 60 capsule 2    losartan-hydrochlorothiazide 100-25 mg (HYZAAR) 100-25 mg per tablet Take 1 tablet by mouth once daily. 90 tablet 1    metFORMIN (GLUCOPHAGE-XR) 500 MG ER 24hr tablet Take 1 tablet (500 mg total) by mouth 2 (two) times daily with meals. 180 tablet 1    multivitamin-Ca-iron-minerals 18-0.4 mg Tab Take 1 tablet by mouth Daily.      mupirocin (BACTROBAN) 2 % ointment Apply topically 3 (three) times daily. 30 g 0    ondansetron (ZOFRAN-ODT) 4 MG TbDL Take 2 tablets (8 mg total) by mouth every 8 (eight) hours as needed (nausea). 30 tablet 0    pantoprazole (PROTONIX) 40 MG tablet Take 1 tablet (40 mg total) by mouth once daily. 90 tablet 1    sertraline (ZOLOFT) 100 MG tablet  Take 2 tablets (200 mg total) by mouth once daily. 180 tablet 1    tiZANidine (ZANAFLEX) 4 MG tablet TAKE 1 TABLET BY MOUTH EVERY 6 HOURS AS NEEDED 90 tablet 0    mirtazapine (REMERON) 15 MG tablet Take 1 tablet (15 mg total) by mouth every evening. (Patient not taking: Reported on 9/13/2021) 90 tablet 1    oxybutynin (DITROPAN-XL) 10 MG 24 hr tablet Take 1 tablet (10 mg total) by mouth once daily. 30 tablet 11     No current facility-administered medications for this visit.       Review of patient's allergies indicates:   Allergen Reactions    Fish containing products Other (See Comments)     Difficulty breathing    Iodine Other (See Comments)    Shellfish containing products      Other reaction(s): Difficulty breathing  Other reaction(s): Difficulty breathing    Latex Rash       Physical Exam  Vitals:    01/11/22 1120   BP: (!) 140/90     General: Well-developed, well-nourished, in no acute distress  HEENT: Normocephalic, atraumatic, extraocular movements intact  Neck: Supple, no supraclavicular or cervical lymphadenopathy, trachea midline  Respirations: even and unlabored  Back: midline spine, No CVA tenderness  Abdomen: soft, Non-tender, non-distended, no palpable masses, no rebound or guarding, well-healed pfannensteil scar  : Normal external female genitalia without lesions. Orthotopic urethral meatus. atrophic vaginal mucosa. No Cysotcele, Grade 2 Rectocele, No apical prolapse, + cough stress test, no significant urethral hypermobility  Extremities: moves all equally, no clubbing, cyanosis or edema  Skin: Warm and dry. No lesions  Psych: normal affect  Neuro: Alert and oriented x 3. Cranial nerves II-XII intact      Urinalysis  pH, UA   Date Value Ref Range Status   09/13/2021 7.0 5.0 - 8.0 Final     Protein, UA   Date Value Ref Range Status   09/13/2021 Negative Negative Final     Comment:     Recommend a 24 hour urine protein or a urine   protein/creatinine ratio if globulin induced proteinuria  is  clinically suspected.       Glucose, UA   Date Value Ref Range Status   09/13/2021 Negative Negative Final     Occult Blood UA   Date Value Ref Range Status   09/13/2021 Negative Negative Final     Nitrite, UA   Date Value Ref Range Status   09/13/2021 Negative Negative Final     Leukocytes, UA   Date Value Ref Range Status   09/13/2021 Negative Negative Final       Assessment:  1. Mixed incontinence  POCT URINE DIPSTICK WITHOUT MICROSCOPE   2. Urinary incontinence, unspecified type  Ambulatory referral/consult to Urology   3. Kidney stones  US Retroperitoneal Complete (Kidney and         Plan:   Mixed incontinence  -     POCT URINE DIPSTICK WITHOUT MICROSCOPE    Urinary incontinence, unspecified type  -     Ambulatory referral/consult to Urology    Kidney stones  -     US Retroperitoneal Complete (Kidney and; Future; Expected date: 01/11/2022    Other orders  -     oxybutynin (DITROPAN-XL) 10 MG 24 hr tablet; Take 1 tablet (10 mg total) by mouth once daily.  Dispense: 30 tablet; Refill: 11    I had a detailed discussion with the patient today regarding management options for stress urinary incontinence. We discussed Kegel exercises, pelvic floor Physical Therapy, Mid-urethral sling, Autologous Rectus fascial sling and urethral bulking injections. We also discussed the different treatment options for urge symptoms. She would like to start with kegels and pharmacologic management.     Follow up in about 2 months (around 3/11/2022).

## 2022-01-11 NOTE — PATIENT INSTRUCTIONS
"Patient Education       Pelvic Muscle (Kegel) Exercises   The Basics   Written by the doctors and editors at [facility]   What are pelvic muscle exercises? -- Pelvic muscle exercises are exercises that strengthen the muscles that support the organs in the pelvis. These organs include the bladder and rectum. In the female pelvis, they also include the uterus (figure 1). The pelvic muscles are also called the "pelvic floor."  Pelvic muscle exercises are also known as "Kegel" exercises. They can help keep you from leaking urine, gas, or bowel movements, if leaks are a problem for you. They can also help with a condition called "pelvic organ prolapse." This is when the organs in the lower belly drop down and press against or bulge into the vagina.  How do I learn how to do pelvic muscle exercises? -- If you want to try pelvic muscle exercises, start by talking to your doctor or nurse. They can talk to you about whether these exercises can help you. They can also teach you how to do them correctly.  You will need to learn which muscles to tighten. It is sometimes hard to figure out the right muscles. Below are some ways you can practice:   · People with female or male anatomy - Squeeze the muscles you would use to avoid passing gas.  · People with female anatomy - Put a finger inside your vagina and squeeze the muscles around your finger. Or you can imagine that you are sitting on a marble and have to pick it up using your vagina.  · People with male anatomy - Squeeze the muscles that control the flow of urine. These exercises might help reduce urine leaks in people who have had surgery to treat prostate cancer or an enlarged prostate.  No matter how you learn to do pelvic muscle exercises, it's important to know is that the muscles involved are not in your belly, thighs, or buttocks.  After you learn which muscles to tighten, you can do the exercises in any position (standing, sitting, or lying down).  Should I see a " "physical therapist? -- Your doctor or nurse might suggest working with a physical therapist who has special training in pelvic floor issues. They can check your muscle strength and teach you specific exercises.  How often should I do the exercises? -- A common approach is to try to do a set of the exercises 3 times a day.  For each set, do the following about 10 times:  · Squeeze your pelvic muscles.  · Hold the muscles tight for about 10 seconds.   · Relax the muscles completely.  Keep up this routine for at least a few months. You will probably notice results, but it might take a few weeks to several months.   How do pelvic muscle exercises help? -- Pelvic muscle exercises can help:  · Prevent urine leaks in people who have "stress incontinence" - This means they leak urine when they cough, laugh, sneeze, or strain.  · Control sudden urges to urinate - These happen to people with "urinary urgency" or "urge incontinence."  · Control the release of gas or bowel movements  · Improve symptoms caused by pelvic organ prolapse - These can include pressure or bulging in the vagina. If you have these symptoms, it's important to see your doctor or nurse to find out the cause.  It might take a few months of doing the exercises regularly before you notice them working. If you have been doing pelvic muscle exercises for several months and they don't seem to be making a difference, tell your doctor or nurse. They might suggest seeing a physical therapist or trying other treatments for your condition.  All topics are updated as new evidence becomes available and our peer review process is complete.  This topic retrieved from [employer] on: Sep 21, 2021.  Topic 37758 Version 11.0  Release: 29.4.2 - C29.263  © 2021 UpToDate, Inc. and/or its affiliates. All rights reserved.  figure 1: Pelvic floor muscles     The "pelvic floor" is a group of muscles that support the organs in the pelvis. In women, these organs include the uterus, " bladder, and rectum.  Graphic 222213 Version 1.0    Consumer Information Use and Disclaimer   This information is not specific medical advice and does not replace information you receive from your health care provider. This is only a brief summary of general information. It does NOT include all information about conditions, illnesses, injuries, tests, procedures, treatments, therapies, discharge instructions or life-style choices that may apply to you. You must talk with your health care provider for complete information about your health and treatment options. This information should not be used to decide whether or not to accept your health care provider's advice, instructions or recommendations. Only your health care provider has the knowledge and training to provide advice that is right for you. The use of this information is governed by the [employer] End User License Agreement, available at https://www.[employer]Tek/en/solutions/Fittr/about/jem.The use of [facility] content is governed by the [employer] Terms of Use. ©2021 UpToDate, Inc. All rights reserved.  Copyright   © 2021 UpToDate, Inc. and/or its affiliates. All rights reserved.     supervision

## 2022-01-17 ENCOUNTER — PATIENT MESSAGE (OUTPATIENT)
Dept: UROLOGY | Facility: CLINIC | Age: 62
End: 2022-01-17
Payer: MEDICARE

## 2022-01-24 ENCOUNTER — TELEPHONE (OUTPATIENT)
Dept: RADIOLOGY | Facility: HOSPITAL | Age: 62
End: 2022-01-24
Payer: MEDICARE

## 2022-01-25 ENCOUNTER — HOSPITAL ENCOUNTER (OUTPATIENT)
Dept: RADIOLOGY | Facility: HOSPITAL | Age: 62
Discharge: HOME | End: 2022-01-25
Attending: UROLOGY
Payer: MEDICARE

## 2022-01-25 DIAGNOSIS — N20.0 KIDNEY STONES: ICD-10-CM

## 2022-01-25 PROCEDURE — 76770 US EXAM ABDO BACK WALL COMP: CPT | Mod: TC,HCNC

## 2022-01-25 PROCEDURE — 76770 US EXAM ABDO BACK WALL COMP: CPT | Mod: 26,HCNC,, | Performed by: RADIOLOGY

## 2022-01-25 PROCEDURE — 76770 US RETROPERITONEAL COMPLETE: ICD-10-PCS | Mod: 26,HCNC,, | Performed by: RADIOLOGY

## 2022-01-27 ENCOUNTER — TELEPHONE (OUTPATIENT)
Dept: UROLOGY | Facility: CLINIC | Age: 62
End: 2022-01-27
Payer: MEDICARE

## 2022-01-27 NOTE — TELEPHONE ENCOUNTER
Called pt and discussed small, non-obstructing renal stone on US. Will observe for now. Pt in agreement.

## 2022-03-18 DIAGNOSIS — E11.9 TYPE 2 DIABETES MELLITUS WITHOUT COMPLICATION: ICD-10-CM

## 2022-03-31 DIAGNOSIS — I15.2 HYPERTENSION ASSOCIATED WITH DIABETES: Primary | ICD-10-CM

## 2022-03-31 DIAGNOSIS — E11.59 HYPERTENSION ASSOCIATED WITH DIABETES: Primary | ICD-10-CM

## 2022-05-01 LAB
ALBUMIN: 4.5 G/DL (ref 3.5–5)
ALP SERPL-CCNC: 71 U/L (ref 40–150)
ALT SERPL W P-5'-P-CCNC: 17 U/L (ref 5–50)
ANION GAP SERPL CALC-SCNC: 12 MMOL/L (ref 8–16)
AST SERPL-CCNC: 20 U/L (ref 10–58)
BILIRUB SERPL-MCNC: 0.4 MG/DL (ref 0.2–1.2)
BUN SERPL-MCNC: 16 MG/DL (ref 5–25)
CALCIUM SERPL-MCNC: 9.9 MG/DL (ref 8.8–10.6)
CHLORIDE SERPL-SCNC: 101 MMOL/L (ref 100–109)
CO2 SERPL-SCNC: 28 MMOL/L (ref 22–33)
CREAT SERPL-MCNC: 0.8 MG/DL (ref 0.6–1.3)
EGFR: 81
GLUCOSE SERPL-MCNC: 97 MG/DL (ref 70–100)
POTASSIUM SERPL-SCNC: 4 MMOL/L (ref 3.5–5.1)
PROT SNV-MCNC: 7.5 G/DL (ref 6–8.3)
SODIUM BLD-SCNC: 141 MMOL/L (ref 136–145)

## 2022-06-10 ENCOUNTER — OFFICE VISIT (OUTPATIENT)
Dept: INTERNAL MEDICINE | Facility: CLINIC | Age: 62
End: 2022-06-10
Payer: MEDICARE

## 2022-06-10 VITALS
BODY MASS INDEX: 34.21 KG/M2 | SYSTOLIC BLOOD PRESSURE: 128 MMHG | HEART RATE: 80 BPM | OXYGEN SATURATION: 98 % | TEMPERATURE: 98 F | DIASTOLIC BLOOD PRESSURE: 78 MMHG | WEIGHT: 193.13 LBS

## 2022-06-10 DIAGNOSIS — I70.0 AORTIC ATHEROSCLEROSIS: ICD-10-CM

## 2022-06-10 DIAGNOSIS — E66.01 MORBID OBESITY: ICD-10-CM

## 2022-06-10 DIAGNOSIS — Z87.891 PERSONAL HISTORY OF NICOTINE DEPENDENCE: ICD-10-CM

## 2022-06-10 DIAGNOSIS — F33.1 MODERATE EPISODE OF RECURRENT MAJOR DEPRESSIVE DISORDER: ICD-10-CM

## 2022-06-10 DIAGNOSIS — Z28.9 DELAYED IMMUNIZATIONS: ICD-10-CM

## 2022-06-10 DIAGNOSIS — Z00.00 ROUTINE GENERAL MEDICAL EXAMINATION AT A HEALTH CARE FACILITY: Primary | ICD-10-CM

## 2022-06-10 DIAGNOSIS — F17.200 NICOTINE DEPENDENCE, UNCOMPLICATED, UNSPECIFIED NICOTINE PRODUCT TYPE: ICD-10-CM

## 2022-06-10 DIAGNOSIS — E11.59 HYPERTENSION ASSOCIATED WITH DIABETES: ICD-10-CM

## 2022-06-10 DIAGNOSIS — E11.36 TYPE 2 DIABETES MELLITUS WITH DIABETIC CATARACT, WITHOUT LONG-TERM CURRENT USE OF INSULIN: ICD-10-CM

## 2022-06-10 DIAGNOSIS — F17.200 SMOKER: ICD-10-CM

## 2022-06-10 DIAGNOSIS — I15.2 HYPERTENSION ASSOCIATED WITH DIABETES: ICD-10-CM

## 2022-06-10 PROCEDURE — 90677 PCV20 VACCINE IM: CPT | Mod: S$GLB,,, | Performed by: FAMILY MEDICINE

## 2022-06-10 PROCEDURE — 99999 PR PBB SHADOW E&M-EST. PATIENT-LVL V: CPT | Mod: PBBFAC,,, | Performed by: FAMILY MEDICINE

## 2022-06-10 PROCEDURE — 3074F SYST BP LT 130 MM HG: CPT | Mod: CPTII,S$GLB,, | Performed by: FAMILY MEDICINE

## 2022-06-10 PROCEDURE — 1159F PR MEDICATION LIST DOCUMENTED IN MEDICAL RECORD: ICD-10-PCS | Mod: CPTII,S$GLB,, | Performed by: FAMILY MEDICINE

## 2022-06-10 PROCEDURE — 3072F LOW RISK FOR RETINOPATHY: CPT | Mod: CPTII,S$GLB,, | Performed by: FAMILY MEDICINE

## 2022-06-10 PROCEDURE — 3078F DIAST BP <80 MM HG: CPT | Mod: CPTII,S$GLB,, | Performed by: FAMILY MEDICINE

## 2022-06-10 PROCEDURE — 3008F BODY MASS INDEX DOCD: CPT | Mod: CPTII,S$GLB,, | Performed by: FAMILY MEDICINE

## 2022-06-10 PROCEDURE — 90677 PNEUMOCOCCAL CONJUGATE VACCINE 20-VALENT: ICD-10-PCS | Mod: S$GLB,,, | Performed by: FAMILY MEDICINE

## 2022-06-10 PROCEDURE — 3008F PR BODY MASS INDEX (BMI) DOCUMENTED: ICD-10-PCS | Mod: CPTII,S$GLB,, | Performed by: FAMILY MEDICINE

## 2022-06-10 PROCEDURE — 1159F MED LIST DOCD IN RCRD: CPT | Mod: CPTII,S$GLB,, | Performed by: FAMILY MEDICINE

## 2022-06-10 PROCEDURE — 3074F PR MOST RECENT SYSTOLIC BLOOD PRESSURE < 130 MM HG: ICD-10-PCS | Mod: CPTII,S$GLB,, | Performed by: FAMILY MEDICINE

## 2022-06-10 PROCEDURE — G0009 ADMIN PNEUMOCOCCAL VACCINE: HCPCS | Mod: S$GLB,,, | Performed by: FAMILY MEDICINE

## 2022-06-10 PROCEDURE — 99396 PREV VISIT EST AGE 40-64: CPT | Mod: S$GLB,,, | Performed by: FAMILY MEDICINE

## 2022-06-10 PROCEDURE — 3072F PR LOW RISK FOR RETINOPATHY: ICD-10-PCS | Mod: CPTII,S$GLB,, | Performed by: FAMILY MEDICINE

## 2022-06-10 PROCEDURE — 3078F PR MOST RECENT DIASTOLIC BLOOD PRESSURE < 80 MM HG: ICD-10-PCS | Mod: CPTII,S$GLB,, | Performed by: FAMILY MEDICINE

## 2022-06-10 PROCEDURE — 99999 PR PBB SHADOW E&M-EST. PATIENT-LVL V: ICD-10-PCS | Mod: PBBFAC,,, | Performed by: FAMILY MEDICINE

## 2022-06-10 PROCEDURE — G0009 PNEUMOCOCCAL CONJUGATE VACCINE 20-VALENT: ICD-10-PCS | Mod: S$GLB,,, | Performed by: FAMILY MEDICINE

## 2022-06-10 PROCEDURE — 99396 PR PREVENTIVE VISIT,EST,40-64: ICD-10-PCS | Mod: S$GLB,,, | Performed by: FAMILY MEDICINE

## 2022-06-10 RX ORDER — ATORVASTATIN CALCIUM 40 MG/1
40 TABLET, FILM COATED ORAL DAILY
Qty: 90 TABLET | Refills: 1 | Status: SHIPPED | OUTPATIENT
Start: 2022-06-10 | End: 2023-03-07 | Stop reason: SDUPTHER

## 2022-06-10 RX ORDER — SERTRALINE HYDROCHLORIDE 100 MG/1
200 TABLET, FILM COATED ORAL DAILY
Qty: 180 TABLET | Refills: 1 | Status: SHIPPED | OUTPATIENT
Start: 2022-06-10 | End: 2022-09-19

## 2022-06-10 RX ORDER — LOSARTAN POTASSIUM AND HYDROCHLOROTHIAZIDE 25; 100 MG/1; MG/1
1 TABLET ORAL DAILY
Qty: 90 TABLET | Refills: 1 | Status: SHIPPED | OUTPATIENT
Start: 2022-06-10 | End: 2023-01-04

## 2022-06-10 RX ORDER — METFORMIN HYDROCHLORIDE 500 MG/1
500 TABLET, EXTENDED RELEASE ORAL 2 TIMES DAILY WITH MEALS
Qty: 180 TABLET | Refills: 1 | Status: SHIPPED | OUTPATIENT
Start: 2022-06-10 | End: 2023-03-07 | Stop reason: SDUPTHER

## 2022-06-10 RX ORDER — PANTOPRAZOLE SODIUM 40 MG/1
40 TABLET, DELAYED RELEASE ORAL DAILY
Qty: 90 TABLET | Refills: 1 | Status: SHIPPED | OUTPATIENT
Start: 2022-06-10 | End: 2023-03-07 | Stop reason: SDUPTHER

## 2022-06-10 NOTE — PROGRESS NOTES
Subjective:       Patient ID: Melita Romero is a 61 y.o. female.    Chief Complaint: Hypertension    Melita Romero is a 61 y.o. female and is here for a comprehensive physical exam.    Do you take any herbs or supplements that were not prescribed by a doctor? yes  Are you taking calcium supplements? no  Are you taking aspirin daily? yes     History:  Any STD's in the past? none    The following portions of the patient's history were reviewed and updated as appropriate: allergies, current medications, past family history, past medical history, past social history, past surgical history and problem list.    Review of Systems  Do you have pain that bothers you in your daily life? no  Pertinent items are noted in HPI.      2. Patient Counseling:  --Nutrition: Stressed importance of moderation in sodium/caffeine intake, saturated fat and cholesterol, caloric balance.  --Exercise: Stressed the importance of regular exercise.   --Substance Abuse: Discussed cessation/primary prevention of tobacco, alcohol - nonuser   --Sexuality: Discussed sexually transmitted disease.  --Injury prevention: Discussed safety belts, smoke detector.   --Dental health: Discussed dental health.  --Immunizations reviewed.    3. Discussed the patient's BMI.  4. Follow up as needed for acute illness      Review of Systems   Constitutional: Negative for fever.   HENT: Negative for congestion.    Eyes: Negative for discharge.   Respiratory: Negative for shortness of breath.    Cardiovascular: Negative for chest pain.   Gastrointestinal: Negative for abdominal pain.   Genitourinary: Negative for difficulty urinating.   Musculoskeletal: Negative for joint swelling.   Neurological: Negative for dizziness.   Psychiatric/Behavioral: Negative for agitation.       Objective:      Physical Exam  Vitals and nursing note reviewed.   Constitutional:       General: She is not in acute distress.     Appearance: Normal appearance. She is  well-developed. She is obese. She is not ill-appearing, toxic-appearing or diaphoretic.   HENT:      Head: Normocephalic and atraumatic.   Eyes:      General: No scleral icterus.     Conjunctiva/sclera: Conjunctivae normal.   Cardiovascular:      Rate and Rhythm: Normal rate and regular rhythm.   Pulmonary:      Effort: Pulmonary effort is normal. No respiratory distress.      Breath sounds: Normal breath sounds. No wheezing.   Abdominal:      General: There is no distension.      Palpations: Abdomen is soft.      Tenderness: There is no abdominal tenderness. There is no guarding.   Skin:     General: Skin is warm.      Coloration: Skin is not pale.      Findings: No erythema or rash.      Comments: Good turgor   Neurological:      Mental Status: She is alert.   Psychiatric:         Mood and Affect: Mood normal.         Behavior: Behavior normal.         Thought Content: Thought content normal.         Assessment:       1. Routine general medical examination at a health care facility    2. Hypertension associated with diabetes    3. Moderate episode of recurrent major depressive disorder    4. Type 2 diabetes mellitus with diabetic cataract, without long-term current use of insulin    5. Delayed immunizations    6. Morbid obesity    7. Aortic atherosclerosis    8. Smoker    9. Nicotine dependence, uncomplicated, unspecified nicotine product type    10. Personal history of nicotine dependence         Plan:     Problem List Items Addressed This Visit        Psychiatric    Moderate episode of recurrent major depressive disorder    Relevant Medications    sertraline (ZOLOFT) 100 MG tablet       Cardiac/Vascular    Hypertension associated with diabetes    Overview     Chronic, Stable, cont losartan-hctz           Relevant Medications    losartan-hydrochlorothiazide 100-25 mg (HYZAAR) 100-25 mg per tablet    metFORMIN (GLUCOPHAGE-XR) 500 MG ER 24hr tablet    Other Relevant Orders    CBC Auto Differential    Ferritin     Iron and TIBC    Aortic atherosclerosis    Overview     Chronic, Stable, cont asa / statin  CT chest 7/15/21              ID    Delayed immunizations    Relevant Orders    Pneumococcal Conjugate Vaccine (20 Valent) (IM)       Endocrine    Morbid obesity    Current Assessment & Plan     Do not eat starches. (nothing white)  Stop sugary snacks,  Stop bottled juices, or sodas.  See if you can lose any weight by stopping these items first, then we will re-visit the issue.  Glycemic Index Diet Handout given to pt with explanation.             Type 2 diabetes mellitus with diabetic cataract, without long-term current use of insulin    Overview     Chronic, Stable, cont metformin           Relevant Medications    atorvastatin (LIPITOR) 40 MG tablet    metFORMIN (GLUCOPHAGE-XR) 500 MG ER 24hr tablet    Other Relevant Orders    Microalbumin/Creatinine Ratio, Urine    Comprehensive Metabolic Panel    Hemoglobin A1C    Lipid Panel    TSH       Other    Routine general medical examination at a health care facility - Primary    Smoker    Relevant Orders    Ambulatory referral/consult to Smoking Cessation Program    CT Chest Lung Screening Low Dose    Nicotine dependence, uncomplicated    Relevant Orders    CT Chest Lung Screening Low Dose      Other Visit Diagnoses     Personal history of nicotine dependence         Relevant Orders    CT Chest Lung Screening Low Dose

## 2022-07-01 ENCOUNTER — PATIENT MESSAGE (OUTPATIENT)
Dept: PSYCHIATRY | Facility: CLINIC | Age: 62
End: 2022-07-01
Payer: MEDICARE

## 2022-07-12 ENCOUNTER — OFFICE VISIT (OUTPATIENT)
Dept: PSYCHIATRY | Facility: CLINIC | Age: 62
End: 2022-07-12
Payer: MEDICARE

## 2022-07-12 DIAGNOSIS — F41.1 GENERALIZED ANXIETY DISORDER: ICD-10-CM

## 2022-07-12 PROCEDURE — 3072F PR LOW RISK FOR RETINOPATHY: ICD-10-PCS | Mod: CPTII,95,, | Performed by: PSYCHIATRY & NEUROLOGY

## 2022-07-12 PROCEDURE — 3072F LOW RISK FOR RETINOPATHY: CPT | Mod: CPTII,95,, | Performed by: PSYCHIATRY & NEUROLOGY

## 2022-07-12 PROCEDURE — 99214 OFFICE O/P EST MOD 30 MIN: CPT | Mod: 95,,, | Performed by: PSYCHIATRY & NEUROLOGY

## 2022-07-12 PROCEDURE — 99214 PR OFFICE/OUTPT VISIT, EST, LEVL IV, 30-39 MIN: ICD-10-PCS | Mod: 95,,, | Performed by: PSYCHIATRY & NEUROLOGY

## 2022-07-12 RX ORDER — HYDROXYZINE PAMOATE 25 MG/1
CAPSULE ORAL
Qty: 60 CAPSULE | Refills: 2 | Status: SHIPPED | OUTPATIENT
Start: 2022-07-12 | End: 2022-09-13 | Stop reason: SDUPTHER

## 2022-07-12 RX ORDER — DULOXETIN HYDROCHLORIDE 30 MG/1
CAPSULE, DELAYED RELEASE ORAL
Qty: 60 CAPSULE | Refills: 2 | Status: SHIPPED | OUTPATIENT
Start: 2022-07-12 | End: 2022-09-13
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
## 2022-08-03 NOTE — TELEPHONE ENCOUNTER
----- Message from Melita Alejandre sent at 11/6/2018  8:08 AM CST -----  Patient scheduled for  her annual exam  On 12/2718.. Request possible blood work prior to appt. If an earlier appt opens call pt.   Recently loss her  and wanting to start taking care of herself.  If needs a refill of medication prior to appt she will have pharmacy ([facility] in [place]) fax a request. [phone].    Griseofulvin Pregnancy And Lactation Text: This medication is Pregnancy Category X and is known to cause serious birth defects. It is unknown if this medication is excreted in breast milk but breast feeding should be avoided.

## 2022-08-15 ENCOUNTER — TELEPHONE (OUTPATIENT)
Dept: PSYCHIATRY | Facility: CLINIC | Age: 62
End: 2022-08-15
Payer: MEDICARE

## 2022-08-17 ENCOUNTER — PATIENT MESSAGE (OUTPATIENT)
Dept: PSYCHIATRY | Facility: CLINIC | Age: 62
End: 2022-08-17
Payer: MEDICARE

## 2022-08-17 ENCOUNTER — OFFICE VISIT (OUTPATIENT)
Dept: URGENT CARE | Facility: CLINIC | Age: 62
End: 2022-08-17
Payer: MEDICARE

## 2022-08-17 ENCOUNTER — PATIENT MESSAGE (OUTPATIENT)
Dept: ADMINISTRATIVE | Facility: HOSPITAL | Age: 62
End: 2022-08-17
Payer: MEDICARE

## 2022-08-17 ENCOUNTER — PATIENT OUTREACH (OUTPATIENT)
Dept: ADMINISTRATIVE | Facility: HOSPITAL | Age: 62
End: 2022-08-17
Payer: MEDICARE

## 2022-08-17 VITALS
HEART RATE: 88 BPM | HEIGHT: 63 IN | OXYGEN SATURATION: 95 % | WEIGHT: 190 LBS | DIASTOLIC BLOOD PRESSURE: 88 MMHG | TEMPERATURE: 98 F | RESPIRATION RATE: 18 BRPM | BODY MASS INDEX: 33.66 KG/M2 | SYSTOLIC BLOOD PRESSURE: 151 MMHG

## 2022-08-17 DIAGNOSIS — R51.9 NONINTRACTABLE HEADACHE, UNSPECIFIED CHRONICITY PATTERN, UNSPECIFIED HEADACHE TYPE: Primary | ICD-10-CM

## 2022-08-17 LAB
CTP QC/QA: YES
SARS-COV-2 RDRP RESP QL NAA+PROBE: NEGATIVE

## 2022-08-17 PROCEDURE — 99214 PR OFFICE/OUTPT VISIT, EST, LEVL IV, 30-39 MIN: ICD-10-PCS | Mod: 25,S$GLB,, | Performed by: NURSE PRACTITIONER

## 2022-08-17 PROCEDURE — 3079F PR MOST RECENT DIASTOLIC BLOOD PRESSURE 80-89 MM HG: ICD-10-PCS | Mod: CPTII,S$GLB,, | Performed by: NURSE PRACTITIONER

## 2022-08-17 PROCEDURE — 3008F BODY MASS INDEX DOCD: CPT | Mod: CPTII,S$GLB,, | Performed by: NURSE PRACTITIONER

## 2022-08-17 PROCEDURE — 99214 OFFICE O/P EST MOD 30 MIN: CPT | Mod: 25,S$GLB,, | Performed by: NURSE PRACTITIONER

## 2022-08-17 PROCEDURE — 3079F DIAST BP 80-89 MM HG: CPT | Mod: CPTII,S$GLB,, | Performed by: NURSE PRACTITIONER

## 2022-08-17 PROCEDURE — 96372 THER/PROPH/DIAG INJ SC/IM: CPT | Mod: S$GLB,,, | Performed by: NURSE PRACTITIONER

## 2022-08-17 PROCEDURE — 3072F PR LOW RISK FOR RETINOPATHY: ICD-10-PCS | Mod: CPTII,S$GLB,, | Performed by: NURSE PRACTITIONER

## 2022-08-17 PROCEDURE — 1159F PR MEDICATION LIST DOCUMENTED IN MEDICAL RECORD: ICD-10-PCS | Mod: CPTII,S$GLB,, | Performed by: NURSE PRACTITIONER

## 2022-08-17 PROCEDURE — 1159F MED LIST DOCD IN RCRD: CPT | Mod: CPTII,S$GLB,, | Performed by: NURSE PRACTITIONER

## 2022-08-17 PROCEDURE — 3077F PR MOST RECENT SYSTOLIC BLOOD PRESSURE >= 140 MM HG: ICD-10-PCS | Mod: CPTII,S$GLB,, | Performed by: NURSE PRACTITIONER

## 2022-08-17 PROCEDURE — 96372 PR INJECTION,THERAP/PROPH/DIAG2ST, IM OR SUBCUT: ICD-10-PCS | Mod: S$GLB,,, | Performed by: NURSE PRACTITIONER

## 2022-08-17 PROCEDURE — 3008F PR BODY MASS INDEX (BMI) DOCUMENTED: ICD-10-PCS | Mod: CPTII,S$GLB,, | Performed by: NURSE PRACTITIONER

## 2022-08-17 PROCEDURE — 3072F LOW RISK FOR RETINOPATHY: CPT | Mod: CPTII,S$GLB,, | Performed by: NURSE PRACTITIONER

## 2022-08-17 PROCEDURE — U0002: ICD-10-PCS | Mod: QW,S$GLB,, | Performed by: NURSE PRACTITIONER

## 2022-08-17 PROCEDURE — U0002 COVID-19 LAB TEST NON-CDC: HCPCS | Mod: QW,S$GLB,, | Performed by: NURSE PRACTITIONER

## 2022-08-17 PROCEDURE — 3077F SYST BP >= 140 MM HG: CPT | Mod: CPTII,S$GLB,, | Performed by: NURSE PRACTITIONER

## 2022-08-17 RX ORDER — KETOROLAC TROMETHAMINE 30 MG/ML
30 INJECTION, SOLUTION INTRAMUSCULAR; INTRAVENOUS
Status: COMPLETED | OUTPATIENT
Start: 2022-08-17 | End: 2022-08-17

## 2022-08-17 RX ORDER — BUTALBITAL, ACETAMINOPHEN AND CAFFEINE 50; 325; 40 MG/1; MG/1; MG/1
1 TABLET ORAL EVERY 6 HOURS PRN
Qty: 12 TABLET | Refills: 0 | Status: SHIPPED | OUTPATIENT
Start: 2022-08-17 | End: 2022-09-19

## 2022-08-17 RX ADMIN — KETOROLAC TROMETHAMINE 30 MG: 30 INJECTION, SOLUTION INTRAMUSCULAR; INTRAVENOUS at 01:08

## 2022-08-17 NOTE — PATIENT INSTRUCTIONS

## 2022-08-17 NOTE — PROGRESS NOTES
"Subjective:       Patient ID: Melita Romero is a 61 y.o. female.    Vitals:  height is 5' 3" (1.6 m) and weight is 86.2 kg (190 lb). Her tympanic temperature is 97.5 °F (36.4 °C). Her blood pressure is 151/88 (abnormal) and her pulse is 88. Her respiration is 18 and oxygen saturation is 95%.     Chief Complaint: Headache    61 yr old female presenting to the Urgent Care with complaint of headache x 1 week. Temporary relief noted with OTC tylenol. Patient denies any fever, SOB, abdominal pain or CP at this time.     Headache   This is a new problem. The current episode started in the past 7 days. The problem occurs constantly. The problem has been gradually worsening. The pain is located in the frontal (behind left eye) region. The pain does not radiate. The pain quality is similar to prior headaches. The quality of the pain is described as throbbing. The pain is at a severity of 8/10. The pain is severe. Pertinent negatives include no abdominal pain, abnormal behavior, anorexia, back pain, blurred vision, coughing, dizziness, drainage, ear pain, eye pain, eye redness, eye watering, facial sweating, fever, hearing loss, insomnia, loss of balance, muscle aches, nausea, neck pain, numbness, phonophobia, photophobia, rhinorrhea, scalp tenderness, seizures, sinus pressure, sore throat, swollen glands, tingling, tinnitus, visual change, vomiting, weakness or weight loss. Nothing aggravates the symptoms. She has tried acetaminophen for the symptoms. The treatment provided no relief. Her past medical history is significant for hypertension and migraine headaches. There is no history of cancer, cluster headaches, immunosuppression, migraines in the family, obesity, pseudotumor cerebri, recent head traumas, sinus disease or TMJ.       Constitution: Negative for chills, sweating, fatigue and fever.   HENT: Negative for ear pain, tinnitus, hearing loss, sinus pressure and sore throat.    Neck: Negative for neck pain. "   Eyes: Negative for eye pain, eye redness, photophobia and blurred vision.   Respiratory: Negative for cough.    Gastrointestinal: Negative for abdominal pain, nausea and vomiting.   Musculoskeletal: Negative for back pain.   Neurological: Positive for headaches and history of migraines. Negative for dizziness, loss of balance, altered mental status, numbness and seizures.   Psychiatric/Behavioral: Negative for altered mental status. The patient does not have insomnia.        Objective:      Physical Exam   Constitutional: She is oriented to person, place, and time. She appears well-developed. She is cooperative.  Non-toxic appearance. She does not appear ill. No distress.   HENT:   Head: Normocephalic and atraumatic.   Ears:   Right Ear: External ear normal.   Left Ear: External ear normal.   Eyes: Conjunctivae, EOM and lids are normal. Pupils are equal, round, and reactive to light. No scleral icterus.   Neck: Trachea normal and phonation normal. Neck supple. No neck rigidity present.   Cardiovascular: Normal rate, regular rhythm, normal heart sounds and normal pulses.   Pulmonary/Chest: Effort normal and breath sounds normal. No respiratory distress.   Abdominal: Normal appearance.   Musculoskeletal: Normal range of motion.         General: No deformity. Normal range of motion.   Neurological: no focal deficit. She is alert and oriented to person, place, and time. No cranial nerve deficit. She exhibits normal muscle tone. Gait normal. Coordination normal.   Skin: Skin is warm, dry, intact, not diaphoretic and not pale.   Psychiatric: Her speech is normal and behavior is normal. Judgment and thought content normal.   Nursing note and vitals reviewed.        Assessment:       1. Nonintractable headache, unspecified chronicity pattern, unspecified headache type        Results for orders placed or performed in visit on 08/17/22   POCT COVID-19 Rapid Screening   Result Value Ref Range    POC Rapid COVID Negative  Negative     Acceptable Yes        Plan:       This is an urgent evaluation of a 61 y.o. female with a PMHx of migraines presenting to the  for gradual onset of headache similar to past headaches per patient. Patient reports a lot of personal stress lately. Denies traumatic injury, LOC, emesis, and seizure. Also denies fever, neck rigidity, sinus pain, dental pain, and otalgia. Vitals reassuring. Neurologically intact without focal deficits.      Given trial of Toradol in UC. Remains well appearing. Vitals stable.     Presentation most consistent with acute migraine vs tension HA in this patient with Hx of recurrent HAs. No clinical Hx or findings to suggest intracranial hemorrhage or warrant emergent imaging at this time. Not consistent with infectious etiology, including for meningitis, acute bacterial sinusitis, dental abscess, AOM, and mastoiditis.    Discharged home with Fioricet.     I discussed with the patient the diagnosis, treatment plan, indications for return to the emergency department, and for expected follow-up. The patient verbalized an understanding. The patient is asked if there are any questions or concerns. We discuss the case, until all issues are addressed to the patients satisfaction. Patient understands and is agreeable to the plan.     Nonintractable headache, unspecified chronicity pattern, unspecified headache type  -     POCT COVID-19 Rapid Screening  -     ketorolac injection 30 mg  -     butalbital-acetaminophen-caffeine -40 mg (FIORICET, ESGIC) -40 mg per tablet; Take 1 tablet by mouth every 6 (six) hours as needed for Pain or Headaches.  Dispense: 12 tablet; Refill: 0      Patient Instructions   If you were prescribed a narcotic or controlled medication, do not drive or operate heavy equipment or machinery while taking these medications.  You must understand that you've received an Urgent Care treatment only and that you may be released before all your  medical problems are known or treated. You, the patient, will arrange for follow up care as instructed.  Follow up with your PCP or specialty clinic as directed within 2-5 days if not improved or as needed.  You can call [phone] to schedule an appointment with the appropriate provider.  If your condition worsens we recommend that you receive another evaluation at the emergency room immediately or contact your primary medical clinics after hours call service to discuss your concerns.  Please return here or go to the Emergency Department for any concerns or worsening of condition.

## 2022-08-17 NOTE — PROGRESS NOTES
Working Dm Report:       Pt overdue for DM labs. Called to schedule, no answer-LVM and sent [employer] messae.     No Updates found in Care Everywhere, [facility], [facility] or [facility].

## 2022-08-20 ENCOUNTER — TELEPHONE (OUTPATIENT)
Dept: URGENT CARE | Facility: CLINIC | Age: 62
End: 2022-08-20
Payer: MEDICARE

## 2022-08-26 NOTE — TELEPHONE ENCOUNTER
I called and spoke with patient and advised her of  say, and she states that she understand and she did schedule.   Satisfactory

## 2022-09-09 ENCOUNTER — TELEPHONE (OUTPATIENT)
Dept: PSYCHIATRY | Facility: CLINIC | Age: 62
End: 2022-09-09
Payer: MEDICARE

## 2022-09-11 ENCOUNTER — PATIENT MESSAGE (OUTPATIENT)
Dept: ADMINISTRATIVE | Facility: HOSPITAL | Age: 62
End: 2022-09-11
Payer: MEDICARE

## 2022-09-12 PROBLEM — Z00.00 ROUTINE GENERAL MEDICAL EXAMINATION AT A HEALTH CARE FACILITY: Status: RESOLVED | Noted: 2021-03-15 | Resolved: 2022-09-12

## 2022-09-13 ENCOUNTER — PATIENT OUTREACH (OUTPATIENT)
Dept: ADMINISTRATIVE | Facility: HOSPITAL | Age: 62
End: 2022-09-13
Payer: MEDICARE

## 2022-09-13 ENCOUNTER — OFFICE VISIT (OUTPATIENT)
Dept: PSYCHIATRY | Facility: CLINIC | Age: 62
End: 2022-09-13
Payer: MEDICARE

## 2022-09-13 DIAGNOSIS — G47.00 INSOMNIA, UNSPECIFIED TYPE: Primary | ICD-10-CM

## 2022-09-13 DIAGNOSIS — F41.1 GENERALIZED ANXIETY DISORDER: ICD-10-CM

## 2022-09-13 PROCEDURE — 3072F LOW RISK FOR RETINOPATHY: CPT | Mod: CPTII,95,, | Performed by: PSYCHIATRY & NEUROLOGY

## 2022-09-13 PROCEDURE — 99214 PR OFFICE/OUTPT VISIT, EST, LEVL IV, 30-39 MIN: ICD-10-PCS | Mod: 95,,, | Performed by: PSYCHIATRY & NEUROLOGY

## 2022-09-13 PROCEDURE — 99214 OFFICE O/P EST MOD 30 MIN: CPT | Mod: 95,,, | Performed by: PSYCHIATRY & NEUROLOGY

## 2022-09-13 PROCEDURE — 3072F PR LOW RISK FOR RETINOPATHY: ICD-10-PCS | Mod: CPTII,95,, | Performed by: PSYCHIATRY & NEUROLOGY

## 2022-09-13 RX ORDER — DULOXETIN HYDROCHLORIDE 60 MG/1
60 CAPSULE, DELAYED RELEASE ORAL DAILY
Qty: 30 CAPSULE | Refills: 4 | Status: SHIPPED | OUTPATIENT
Start: 2022-09-13 | End: 2022-10-25

## 2022-09-13 RX ORDER — HYDROXYZINE PAMOATE 25 MG/1
CAPSULE ORAL
Qty: 60 CAPSULE | Refills: 2 | Status: SHIPPED | OUTPATIENT
Start: 2022-09-13 | End: 2023-08-03
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
## 2022-09-19 ENCOUNTER — LAB VISIT (OUTPATIENT)
Dept: LAB | Facility: HOSPITAL | Age: 62
End: 2022-09-19
Attending: FAMILY MEDICINE
Payer: MEDICARE

## 2022-09-19 ENCOUNTER — TELEPHONE (OUTPATIENT)
Dept: INTERNAL MEDICINE | Facility: CLINIC | Age: 62
End: 2022-09-19

## 2022-09-19 ENCOUNTER — OFFICE VISIT (OUTPATIENT)
Dept: INTERNAL MEDICINE | Facility: CLINIC | Age: 62
End: 2022-09-19
Payer: MEDICARE

## 2022-09-19 VITALS
HEIGHT: 63 IN | TEMPERATURE: 98 F | BODY MASS INDEX: 32.86 KG/M2 | WEIGHT: 185.44 LBS | DIASTOLIC BLOOD PRESSURE: 86 MMHG | HEART RATE: 85 BPM | SYSTOLIC BLOOD PRESSURE: 130 MMHG

## 2022-09-19 DIAGNOSIS — E78.5 HYPERLIPIDEMIA ASSOCIATED WITH TYPE 2 DIABETES MELLITUS: ICD-10-CM

## 2022-09-19 DIAGNOSIS — E11.36 TYPE 2 DIABETES MELLITUS WITH DIABETIC CATARACT, WITHOUT LONG-TERM CURRENT USE OF INSULIN: ICD-10-CM

## 2022-09-19 DIAGNOSIS — Z12.31 SCREENING MAMMOGRAM, ENCOUNTER FOR: ICD-10-CM

## 2022-09-19 DIAGNOSIS — I15.2 HYPERTENSION ASSOCIATED WITH DIABETES: ICD-10-CM

## 2022-09-19 DIAGNOSIS — M54.9 DORSALGIA, UNSPECIFIED: ICD-10-CM

## 2022-09-19 DIAGNOSIS — E11.36 TYPE 2 DIABETES MELLITUS WITH DIABETIC CATARACT, WITHOUT LONG-TERM CURRENT USE OF INSULIN: Primary | ICD-10-CM

## 2022-09-19 DIAGNOSIS — E11.69 HYPERLIPIDEMIA ASSOCIATED WITH TYPE 2 DIABETES MELLITUS: ICD-10-CM

## 2022-09-19 DIAGNOSIS — E11.59 HYPERTENSION ASSOCIATED WITH DIABETES: ICD-10-CM

## 2022-09-19 DIAGNOSIS — M47.816 LUMBAR SPONDYLOSIS: ICD-10-CM

## 2022-09-19 LAB
ALBUMIN SERPL BCP-MCNC: 4.2 G/DL (ref 3.5–5.2)
ALP SERPL-CCNC: 71 U/L (ref 55–135)
ALT SERPL W/O P-5'-P-CCNC: 17 U/L (ref 10–44)
ANION GAP SERPL CALC-SCNC: 11 MMOL/L (ref 8–16)
AST SERPL-CCNC: 19 U/L (ref 10–40)
BASOPHILS # BLD AUTO: 0.05 K/UL (ref 0–0.2)
BASOPHILS NFR BLD: 0.5 % (ref 0–1.9)
BILIRUB SERPL-MCNC: 0.3 MG/DL (ref 0.1–1)
BUN SERPL-MCNC: 12 MG/DL (ref 6–20)
CALCIUM SERPL-MCNC: 10.1 MG/DL (ref 8.7–10.5)
CHLORIDE SERPL-SCNC: 100 MMOL/L (ref 95–110)
CHOLEST SERPL-MCNC: 222 MG/DL (ref 120–199)
CHOLEST/HDLC SERPL: 4.7 {RATIO} (ref 2–5)
CO2 SERPL-SCNC: 27 MMOL/L (ref 23–29)
CREAT SERPL-MCNC: 0.8 MG/DL (ref 0.5–1.4)
DIFFERENTIAL METHOD: ABNORMAL
EOSINOPHIL # BLD AUTO: 0.1 K/UL (ref 0–0.5)
EOSINOPHIL NFR BLD: 1.3 % (ref 0–8)
ERYTHROCYTE [DISTWIDTH] IN BLOOD BY AUTOMATED COUNT: 12.6 % (ref 11.5–14.5)
EST. GFR  (NO RACE VARIABLE): >60 ML/MIN/1.73 M^2
ESTIMATED AVG GLUCOSE: 105 MG/DL (ref 68–131)
FERRITIN SERPL-MCNC: 60 NG/ML (ref 20–300)
GLUCOSE SERPL-MCNC: 97 MG/DL (ref 70–110)
HBA1C MFR BLD: 5.3 % (ref 4–5.6)
HCT VFR BLD AUTO: 46 % (ref 37–48.5)
HDLC SERPL-MCNC: 47 MG/DL (ref 40–75)
HDLC SERPL: 21.2 % (ref 20–50)
HGB BLD-MCNC: 14.9 G/DL (ref 12–16)
IMM GRANULOCYTES # BLD AUTO: 0.02 K/UL (ref 0–0.04)
IMM GRANULOCYTES NFR BLD AUTO: 0.2 % (ref 0–0.5)
IRON SERPL-MCNC: 67 UG/DL (ref 30–160)
LDLC SERPL CALC-MCNC: 139.2 MG/DL (ref 63–159)
LYMPHOCYTES # BLD AUTO: 3.2 K/UL (ref 1–4.8)
LYMPHOCYTES NFR BLD: 34.5 % (ref 18–48)
MCH RBC QN AUTO: 31.5 PG (ref 27–31)
MCHC RBC AUTO-ENTMCNC: 32.4 G/DL (ref 32–36)
MCV RBC AUTO: 97 FL (ref 82–98)
MONOCYTES # BLD AUTO: 0.5 K/UL (ref 0.3–1)
MONOCYTES NFR BLD: 4.8 % (ref 4–15)
NEUTROPHILS # BLD AUTO: 5.5 K/UL (ref 1.8–7.7)
NEUTROPHILS NFR BLD: 58.7 % (ref 38–73)
NONHDLC SERPL-MCNC: 175 MG/DL
NRBC BLD-RTO: 0 /100 WBC
PLATELET # BLD AUTO: 341 K/UL (ref 150–450)
PMV BLD AUTO: 9.4 FL (ref 9.2–12.9)
POTASSIUM SERPL-SCNC: 4 MMOL/L (ref 3.5–5.1)
PROT SERPL-MCNC: 7.2 G/DL (ref 6–8.4)
RBC # BLD AUTO: 4.73 M/UL (ref 4–5.4)
SATURATED IRON: 17 % (ref 20–50)
SODIUM SERPL-SCNC: 138 MMOL/L (ref 136–145)
TOTAL IRON BINDING CAPACITY: 401 UG/DL (ref 250–450)
TRANSFERRIN SERPL-MCNC: 271 MG/DL (ref 200–375)
TRIGL SERPL-MCNC: 179 MG/DL (ref 30–150)
TSH SERPL DL<=0.005 MIU/L-ACNC: 2.1 UIU/ML (ref 0.4–4)
WBC # BLD AUTO: 9.4 K/UL (ref 3.9–12.7)

## 2022-09-19 PROCEDURE — 3079F DIAST BP 80-89 MM HG: CPT | Mod: CPTII,S$GLB,, | Performed by: FAMILY MEDICINE

## 2022-09-19 PROCEDURE — 3075F PR MOST RECENT SYSTOLIC BLOOD PRESS GE 130-139MM HG: ICD-10-PCS | Mod: CPTII,S$GLB,, | Performed by: FAMILY MEDICINE

## 2022-09-19 PROCEDURE — 3072F PR LOW RISK FOR RETINOPATHY: ICD-10-PCS | Mod: CPTII,S$GLB,, | Performed by: FAMILY MEDICINE

## 2022-09-19 PROCEDURE — 99999 PR PBB SHADOW E&M-EST. PATIENT-LVL V: CPT | Mod: PBBFAC,,, | Performed by: FAMILY MEDICINE

## 2022-09-19 PROCEDURE — 3079F PR MOST RECENT DIASTOLIC BLOOD PRESSURE 80-89 MM HG: ICD-10-PCS | Mod: CPTII,S$GLB,, | Performed by: FAMILY MEDICINE

## 2022-09-19 PROCEDURE — 1159F PR MEDICATION LIST DOCUMENTED IN MEDICAL RECORD: ICD-10-PCS | Mod: CPTII,S$GLB,, | Performed by: FAMILY MEDICINE

## 2022-09-19 PROCEDURE — 99499 UNLISTED E&M SERVICE: CPT | Mod: HCNC,S$GLB,, | Performed by: FAMILY MEDICINE

## 2022-09-19 PROCEDURE — 1160F RVW MEDS BY RX/DR IN RCRD: CPT | Mod: CPTII,S$GLB,, | Performed by: FAMILY MEDICINE

## 2022-09-19 PROCEDURE — 3072F LOW RISK FOR RETINOPATHY: CPT | Mod: CPTII,S$GLB,, | Performed by: FAMILY MEDICINE

## 2022-09-19 PROCEDURE — 84466 ASSAY OF TRANSFERRIN: CPT | Performed by: FAMILY MEDICINE

## 2022-09-19 PROCEDURE — 83036 HEMOGLOBIN GLYCOSYLATED A1C: CPT | Performed by: FAMILY MEDICINE

## 2022-09-19 PROCEDURE — 3008F BODY MASS INDEX DOCD: CPT | Mod: CPTII,S$GLB,, | Performed by: FAMILY MEDICINE

## 2022-09-19 PROCEDURE — 36415 COLL VENOUS BLD VENIPUNCTURE: CPT | Mod: PO | Performed by: FAMILY MEDICINE

## 2022-09-19 PROCEDURE — 84443 ASSAY THYROID STIM HORMONE: CPT | Performed by: FAMILY MEDICINE

## 2022-09-19 PROCEDURE — 99214 OFFICE O/P EST MOD 30 MIN: CPT | Mod: S$GLB,,, | Performed by: FAMILY MEDICINE

## 2022-09-19 PROCEDURE — 99214 PR OFFICE/OUTPT VISIT, EST, LEVL IV, 30-39 MIN: ICD-10-PCS | Mod: S$GLB,,, | Performed by: FAMILY MEDICINE

## 2022-09-19 PROCEDURE — 1160F PR REVIEW ALL MEDS BY PRESCRIBER/CLIN PHARMACIST DOCUMENTED: ICD-10-PCS | Mod: CPTII,S$GLB,, | Performed by: FAMILY MEDICINE

## 2022-09-19 PROCEDURE — 1159F MED LIST DOCD IN RCRD: CPT | Mod: CPTII,S$GLB,, | Performed by: FAMILY MEDICINE

## 2022-09-19 PROCEDURE — 85025 COMPLETE CBC W/AUTO DIFF WBC: CPT | Performed by: FAMILY MEDICINE

## 2022-09-19 PROCEDURE — 3008F PR BODY MASS INDEX (BMI) DOCUMENTED: ICD-10-PCS | Mod: CPTII,S$GLB,, | Performed by: FAMILY MEDICINE

## 2022-09-19 PROCEDURE — 99999 PR PBB SHADOW E&M-EST. PATIENT-LVL V: ICD-10-PCS | Mod: PBBFAC,,, | Performed by: FAMILY MEDICINE

## 2022-09-19 PROCEDURE — 3075F SYST BP GE 130 - 139MM HG: CPT | Mod: CPTII,S$GLB,, | Performed by: FAMILY MEDICINE

## 2022-09-19 PROCEDURE — 80061 LIPID PANEL: CPT | Performed by: FAMILY MEDICINE

## 2022-09-19 PROCEDURE — 80053 COMPREHEN METABOLIC PANEL: CPT | Performed by: FAMILY MEDICINE

## 2022-09-19 PROCEDURE — 82728 ASSAY OF FERRITIN: CPT | Performed by: FAMILY MEDICINE

## 2022-09-19 NOTE — PROGRESS NOTES
Subjective:       Patient ID: Melita Romero is a 61 y.o. female.    Chief Complaint: Referral    Diabetes  She presents for her follow-up diabetic visit. She has type 2 diabetes mellitus. Her disease course has been stable. Hypoglycemia symptoms include headaches. Pertinent negatives for hypoglycemia include no confusion. Pertinent negatives for diabetes include no blurred vision, no chest pain, no polydipsia, no polyuria and no weakness.   Review of Systems   Constitutional:  Negative for activity change and unexpected weight change.   HENT:  Negative for hearing loss, rhinorrhea and trouble swallowing.    Eyes:  Negative for blurred vision, discharge and visual disturbance.   Respiratory:  Negative for chest tightness and wheezing.    Cardiovascular:  Negative for chest pain and palpitations.   Gastrointestinal:  Negative for blood in stool, constipation, diarrhea and vomiting.   Endocrine: Negative for polydipsia and polyuria.   Genitourinary:  Negative for difficulty urinating, dysuria, hematuria and menstrual problem.   Musculoskeletal:  Positive for back pain. Negative for arthralgias, joint swelling and neck pain.   Neurological:  Positive for headaches. Negative for weakness.   Psychiatric/Behavioral:  Positive for dysphoric mood. Negative for confusion.      Objective:      Physical Exam  Vitals and nursing note reviewed.   Constitutional:       General: She is not in acute distress.     Appearance: Normal appearance. She is well-developed. She is obese. She is not diaphoretic.   HENT:      Head: Normocephalic and atraumatic.      Comments: Small cyst of right ear  Pulmonary:      Effort: Pulmonary effort is normal. No respiratory distress.      Breath sounds: Normal breath sounds. No wheezing.   Skin:     General: Skin is warm and dry.      Findings: No erythema or rash.   Neurological:      Mental Status: She is alert.       Assessment:       1. Type 2 diabetes mellitus with diabetic cataract,  without long-term current use of insulin    2. Screening mammogram, encounter for    3. Hypertension associated with diabetes    4. Hyperlipidemia associated with type 2 diabetes mellitus    5. Dorsalgia, unspecified    6. Lumbar spondylosis          Plan:     Problem List Items Addressed This Visit          Neuro    Lumbar spondylosis    Relevant Orders    Ambulatory referral/consult to Pain Clinic       Cardiac/Vascular    Hypertension associated with diabetes    Overview     Chronic, Stable, cont losartan-hctz         Hyperlipidemia associated with type 2 diabetes mellitus    Overview     Chronic, Stable, cont statin            Renal/    Screening mammogram, encounter for    Relevant Orders    Mammo Digital Screening Bilat w/ Jimenez       Endocrine    Type 2 diabetes mellitus with diabetic cataract, without long-term current use of insulin - Primary    Overview     Chronic, Stable, cont metformin         Relevant Orders    Ambulatory referral/consult to Podiatry    Ambulatory referral/consult to Optometry       Orthopedic    Dorsalgia, unspecified    Relevant Orders    Ambulatory referral/consult to Pain Clinic

## 2022-09-28 ENCOUNTER — HOSPITAL ENCOUNTER (OUTPATIENT)
Dept: RADIOLOGY | Facility: HOSPITAL | Age: 62
Discharge: HOME | End: 2022-09-28
Attending: FAMILY MEDICINE
Payer: MEDICARE

## 2022-09-28 DIAGNOSIS — Z12.31 SCREENING MAMMOGRAM, ENCOUNTER FOR: ICD-10-CM

## 2022-09-28 PROCEDURE — 77067 SCR MAMMO BI INCL CAD: CPT | Mod: TC

## 2022-09-28 PROCEDURE — 77063 BREAST TOMOSYNTHESIS BI: CPT | Mod: TC

## 2022-09-28 PROCEDURE — 77063 MAMMO DIGITAL SCREENING BILAT WITH TOMO: ICD-10-PCS | Mod: 26,,, | Performed by: RADIOLOGY

## 2022-09-28 PROCEDURE — 77067 MAMMO DIGITAL SCREENING BILAT WITH TOMO: ICD-10-PCS | Mod: 26,,, | Performed by: RADIOLOGY

## 2022-09-28 PROCEDURE — 77063 BREAST TOMOSYNTHESIS BI: CPT | Mod: 26,,, | Performed by: RADIOLOGY

## 2022-09-28 PROCEDURE — 77067 SCR MAMMO BI INCL CAD: CPT | Mod: 26,,, | Performed by: RADIOLOGY

## 2022-10-25 ENCOUNTER — PATIENT MESSAGE (OUTPATIENT)
Dept: PSYCHIATRY | Facility: CLINIC | Age: 62
End: 2022-10-25
Payer: MEDICARE

## 2022-10-25 RX ORDER — SERTRALINE HYDROCHLORIDE 100 MG/1
100 TABLET, FILM COATED ORAL DAILY
Qty: 60 TABLET | Refills: 2 | Status: SHIPPED | OUTPATIENT
Start: 2022-10-25 | End: 2023-02-24 | Stop reason: SDUPTHER

## 2022-10-25 RX ORDER — CLONAZEPAM 0.5 MG/1
0.5 TABLET ORAL DAILY PRN
Qty: 30 TABLET | Refills: 2 | Status: SHIPPED | OUTPATIENT
Start: 2022-10-25 | End: 2022-10-28 | Stop reason: SDUPTHER

## 2022-10-25 NOTE — TELEPHONE ENCOUNTER
Prescription and instructions to switch to sertraline 200 mg daily. Clonazepam 0.5 mg daily prn. Instructed patient to seek therapy appointment and follow-up with me within 2 months.

## 2022-10-28 RX ORDER — CLONAZEPAM 0.5 MG/1
0.5 TABLET ORAL DAILY PRN
Qty: 30 TABLET | Refills: 2 | Status: SHIPPED | OUTPATIENT
Start: 2022-10-28 | End: 2023-02-24 | Stop reason: SDUPTHER

## 2022-10-31 ENCOUNTER — TELEPHONE (OUTPATIENT)
Dept: PHARMACY | Facility: CLINIC | Age: 62
End: 2022-10-31
Payer: MEDICARE

## 2022-11-22 ENCOUNTER — TELEPHONE (OUTPATIENT)
Dept: ADMINISTRATIVE | Facility: HOSPITAL | Age: 62
End: 2022-11-22
Payer: MEDICARE

## 2022-12-19 ENCOUNTER — TELEPHONE (OUTPATIENT)
Dept: ADMINISTRATIVE | Facility: HOSPITAL | Age: 62
End: 2022-12-19
Payer: MEDICARE

## 2022-12-30 PROBLEM — F17.200 SMOKER: Status: RESOLVED | Noted: 2022-06-10 | Resolved: 2022-12-30

## 2022-12-30 PROBLEM — F11.20 UNCOMPLICATED OPIOID DEPENDENCE: Status: ACTIVE | Noted: 2022-12-30

## 2022-12-30 PROBLEM — Z87.891 EX-SMOKER: Status: RESOLVED | Noted: 2021-04-06 | Resolved: 2022-12-30

## 2022-12-30 PROBLEM — G89.4 CHRONIC PAIN SYNDROME: Status: ACTIVE | Noted: 2022-12-30

## 2023-01-03 ENCOUNTER — TELEPHONE (OUTPATIENT)
Dept: ADMINISTRATIVE | Facility: CLINIC | Age: 63
End: 2023-01-03
Payer: MEDICARE

## 2023-01-16 ENCOUNTER — PATIENT MESSAGE (OUTPATIENT)
Dept: INTERNAL MEDICINE | Facility: CLINIC | Age: 63
End: 2023-01-16
Payer: MEDICARE

## 2023-02-02 ENCOUNTER — TELEPHONE (OUTPATIENT)
Dept: ADMINISTRATIVE | Facility: HOSPITAL | Age: 63
End: 2023-02-02
Payer: MEDICARE

## 2023-02-07 DIAGNOSIS — Z00.00 ENCOUNTER FOR MEDICARE ANNUAL WELLNESS EXAM: ICD-10-CM

## 2023-02-09 DIAGNOSIS — Z00.00 ENCOUNTER FOR MEDICARE ANNUAL WELLNESS EXAM: ICD-10-CM

## 2023-02-24 ENCOUNTER — OFFICE VISIT (OUTPATIENT)
Dept: PSYCHIATRY | Facility: CLINIC | Age: 63
End: 2023-02-24
Payer: MEDICARE

## 2023-02-24 DIAGNOSIS — F41.1 GENERALIZED ANXIETY DISORDER: Primary | ICD-10-CM

## 2023-02-24 DIAGNOSIS — G47.00 INSOMNIA, UNSPECIFIED TYPE: ICD-10-CM

## 2023-02-24 PROCEDURE — 99214 OFFICE O/P EST MOD 30 MIN: CPT | Mod: HCNC,95,, | Performed by: PSYCHIATRY & NEUROLOGY

## 2023-02-24 PROCEDURE — 99214 PR OFFICE/OUTPT VISIT, EST, LEVL IV, 30-39 MIN: ICD-10-PCS | Mod: HCNC,95,, | Performed by: PSYCHIATRY & NEUROLOGY

## 2023-02-24 RX ORDER — SERTRALINE HYDROCHLORIDE 100 MG/1
200 TABLET, FILM COATED ORAL DAILY
Qty: 60 TABLET | Refills: 2 | Status: SHIPPED | OUTPATIENT
Start: 2023-02-24 | End: 2023-06-01 | Stop reason: SDUPTHER

## 2023-02-24 RX ORDER — CLONAZEPAM 0.5 MG/1
0.5 TABLET ORAL DAILY PRN
Qty: 30 TABLET | Refills: 2 | Status: SHIPPED | OUTPATIENT
Start: 2023-02-24 | End: 2023-05-30 | Stop reason: SDUPTHER
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
## 2023-03-05 PROBLEM — F11.20 UNCOMPLICATED OPIOID DEPENDENCE: Status: RESOLVED | Noted: 2022-12-30 | Resolved: 2023-03-05

## 2023-03-05 NOTE — PROGRESS NOTES
Subjective:       Patient ID: Melita Romero is a 62 y.o. female.    Chief Complaint: Hypertension    Diabetes  She presents for her follow-up diabetic visit. She has type 2 diabetes mellitus. Her disease course has been stable. Pertinent negatives for diabetes include no blurred vision, no chest pain and no fatigue.   Review of Systems   Constitutional:  Negative for fatigue.   Eyes:  Negative for blurred vision.   Respiratory:  Negative for shortness of breath.    Cardiovascular:  Negative for chest pain.   Gastrointestinal:  Negative for abdominal pain.     Objective:      Physical Exam  Vitals and nursing note reviewed.   Constitutional:       General: She is not in acute distress.     Appearance: Normal appearance. She is well-developed. She is obese. She is not diaphoretic.   HENT:      Head: Normocephalic and atraumatic.      Comments: Small cyst of right ear  Pulmonary:      Effort: Pulmonary effort is normal. No respiratory distress.      Breath sounds: Normal breath sounds. No wheezing.   Skin:     General: Skin is warm and dry.      Findings: No erythema or rash.   Neurological:      Mental Status: She is alert.       Assessment:       1. Type 2 diabetes mellitus with diabetic cataract, without long-term current use of insulin    2. Hypertension associated with diabetes    3. Morbid obesity    4. Aortic atherosclerosis    5. Postmenopausal    6. Vitamin D deficiency disease    7. Skin lesion    8. Environmental allergies          Plan:     Problem List Items Addressed This Visit          Derm    Skin lesion    Relevant Orders    Ambulatory referral/consult to Dermatology       Cardiac/Vascular    Hypertension associated with diabetes    Overview     Chronic, Stable, cont losartan-hctz         Relevant Medications    metFORMIN (GLUCOPHAGE-XR) 500 MG ER 24hr tablet    Aortic atherosclerosis    Overview     Chronic, Stable, cont asa / statin  CT chest 7/15/21            Renal/    Postmenopausal     Relevant Orders    DXA Bone Density Axial Skeleton 1 or more sites       Endocrine    Vitamin D deficiency disease    Relevant Orders    Vitamin D    Morbid obesity    Overview     Do not eat starches. (nothing white)  Stop sugary snacks,  Stop bottled juices, or sodas.  See if you can lose any weight by stopping these items first, then we will re-visit the issue.  Glycemic Index Diet Handout given to pt with explanation.           Type 2 diabetes mellitus with diabetic cataract, without long-term current use of insulin - Primary    Overview     Chronic, Stable, cont metformin         Relevant Medications    atorvastatin (LIPITOR) 40 MG tablet    metFORMIN (GLUCOPHAGE-XR) 500 MG ER 24hr tablet    Other Relevant Orders    Ambulatory referral/consult to Podiatry    Diabetes Digital Medicine (DDMP) Enrollment Order (Completed)    Diabetes Digital Medicine (DDMP): Assign Onboarding Questionnaires (Completed)    Hypertension Digital Medicine (HDMP) Enrollment Order (Completed)    Hypertension Digital Medicine (HDMP): Assign Onboarding Questionnaires (Completed)    Hemoglobin A1C       Other    Environmental allergies    Relevant Orders    Ambulatory referral/consult to Allergy

## 2023-03-07 ENCOUNTER — OFFICE VISIT (OUTPATIENT)
Dept: INTERNAL MEDICINE | Facility: CLINIC | Age: 63
End: 2023-03-07
Payer: MEDICARE

## 2023-03-07 VITALS
TEMPERATURE: 98 F | DIASTOLIC BLOOD PRESSURE: 84 MMHG | HEART RATE: 83 BPM | OXYGEN SATURATION: 100 % | BODY MASS INDEX: 33.31 KG/M2 | WEIGHT: 188.06 LBS | SYSTOLIC BLOOD PRESSURE: 128 MMHG

## 2023-03-07 DIAGNOSIS — E11.59 HYPERTENSION ASSOCIATED WITH DIABETES: ICD-10-CM

## 2023-03-07 DIAGNOSIS — Z78.0 POSTMENOPAUSAL: ICD-10-CM

## 2023-03-07 DIAGNOSIS — I15.2 HYPERTENSION ASSOCIATED WITH DIABETES: ICD-10-CM

## 2023-03-07 DIAGNOSIS — E11.36 TYPE 2 DIABETES MELLITUS WITH DIABETIC CATARACT, WITHOUT LONG-TERM CURRENT USE OF INSULIN: Primary | ICD-10-CM

## 2023-03-07 DIAGNOSIS — E66.01 MORBID OBESITY: ICD-10-CM

## 2023-03-07 DIAGNOSIS — E55.9 VITAMIN D DEFICIENCY DISEASE: ICD-10-CM

## 2023-03-07 DIAGNOSIS — I70.0 AORTIC ATHEROSCLEROSIS: ICD-10-CM

## 2023-03-07 DIAGNOSIS — Z91.09 ENVIRONMENTAL ALLERGIES: ICD-10-CM

## 2023-03-07 DIAGNOSIS — L98.9 SKIN LESION: ICD-10-CM

## 2023-03-07 PROCEDURE — 1159F MED LIST DOCD IN RCRD: CPT | Mod: HCNC,CPTII,S$GLB, | Performed by: FAMILY MEDICINE

## 2023-03-07 PROCEDURE — 99214 PR OFFICE/OUTPT VISIT, EST, LEVL IV, 30-39 MIN: ICD-10-PCS | Mod: HCNC,S$GLB,, | Performed by: FAMILY MEDICINE

## 2023-03-07 PROCEDURE — 3074F SYST BP LT 130 MM HG: CPT | Mod: HCNC,CPTII,S$GLB, | Performed by: FAMILY MEDICINE

## 2023-03-07 PROCEDURE — 99214 OFFICE O/P EST MOD 30 MIN: CPT | Mod: HCNC,S$GLB,, | Performed by: FAMILY MEDICINE

## 2023-03-07 PROCEDURE — 3074F PR MOST RECENT SYSTOLIC BLOOD PRESSURE < 130 MM HG: ICD-10-PCS | Mod: HCNC,CPTII,S$GLB, | Performed by: FAMILY MEDICINE

## 2023-03-07 PROCEDURE — 3008F PR BODY MASS INDEX (BMI) DOCUMENTED: ICD-10-PCS | Mod: HCNC,CPTII,S$GLB, | Performed by: FAMILY MEDICINE

## 2023-03-07 PROCEDURE — 1160F RVW MEDS BY RX/DR IN RCRD: CPT | Mod: HCNC,CPTII,S$GLB, | Performed by: FAMILY MEDICINE

## 2023-03-07 PROCEDURE — 3008F BODY MASS INDEX DOCD: CPT | Mod: HCNC,CPTII,S$GLB, | Performed by: FAMILY MEDICINE

## 2023-03-07 PROCEDURE — 1160F PR REVIEW ALL MEDS BY PRESCRIBER/CLIN PHARMACIST DOCUMENTED: ICD-10-PCS | Mod: HCNC,CPTII,S$GLB, | Performed by: FAMILY MEDICINE

## 2023-03-07 PROCEDURE — 1159F PR MEDICATION LIST DOCUMENTED IN MEDICAL RECORD: ICD-10-PCS | Mod: HCNC,CPTII,S$GLB, | Performed by: FAMILY MEDICINE

## 2023-03-07 PROCEDURE — 3079F PR MOST RECENT DIASTOLIC BLOOD PRESSURE 80-89 MM HG: ICD-10-PCS | Mod: HCNC,CPTII,S$GLB, | Performed by: FAMILY MEDICINE

## 2023-03-07 PROCEDURE — 3079F DIAST BP 80-89 MM HG: CPT | Mod: HCNC,CPTII,S$GLB, | Performed by: FAMILY MEDICINE

## 2023-03-07 PROCEDURE — 99999 PR PBB SHADOW E&M-EST. PATIENT-LVL V: CPT | Mod: PBBFAC,HCNC,, | Performed by: FAMILY MEDICINE

## 2023-03-07 PROCEDURE — 99999 PR PBB SHADOW E&M-EST. PATIENT-LVL V: ICD-10-PCS | Mod: PBBFAC,HCNC,, | Performed by: FAMILY MEDICINE

## 2023-03-07 RX ORDER — METFORMIN HYDROCHLORIDE 500 MG/1
500 TABLET, EXTENDED RELEASE ORAL 2 TIMES DAILY WITH MEALS
Qty: 180 TABLET | Refills: 1 | Status: SHIPPED | OUTPATIENT
Start: 2023-03-07 | End: 2023-09-18 | Stop reason: SDUPTHER

## 2023-03-07 RX ORDER — PERPHENAZINE/AMITRIPTYLINE HCL 2 MG-25 MG
TABLET ORAL
COMMUNITY
Start: 2022-09-22

## 2023-03-07 RX ORDER — ATORVASTATIN CALCIUM 40 MG/1
40 TABLET, FILM COATED ORAL DAILY
Qty: 90 TABLET | Refills: 1 | Status: SHIPPED | OUTPATIENT
Start: 2023-03-07 | End: 2023-09-18 | Stop reason: SDUPTHER

## 2023-03-07 RX ORDER — PANTOPRAZOLE SODIUM 40 MG/1
40 TABLET, DELAYED RELEASE ORAL DAILY
Qty: 90 TABLET | Refills: 1 | Status: SHIPPED | OUTPATIENT
Start: 2023-03-07 | End: 2023-09-18 | Stop reason: SDUPTHER

## 2023-03-07 RX ORDER — TIZANIDINE 4 MG/1
4 TABLET ORAL EVERY 8 HOURS
Qty: 90 TABLET | Refills: 1 | Status: SHIPPED | OUTPATIENT
Start: 2023-03-07 | End: 2023-08-03

## 2023-03-08 ENCOUNTER — LAB VISIT (OUTPATIENT)
Dept: LAB | Facility: HOSPITAL | Age: 63
End: 2023-03-08
Attending: FAMILY MEDICINE
Payer: MEDICARE

## 2023-03-08 DIAGNOSIS — E11.36 TYPE 2 DIABETES MELLITUS WITH DIABETIC CATARACT, WITHOUT LONG-TERM CURRENT USE OF INSULIN: ICD-10-CM

## 2023-03-08 DIAGNOSIS — E11.9 TYPE 2 DIABETES MELLITUS WITHOUT COMPLICATION: ICD-10-CM

## 2023-03-08 DIAGNOSIS — E55.9 VITAMIN D DEFICIENCY DISEASE: ICD-10-CM

## 2023-03-08 PROCEDURE — 36415 COLL VENOUS BLD VENIPUNCTURE: CPT | Mod: HCNC,PO | Performed by: FAMILY MEDICINE

## 2023-03-08 PROCEDURE — 83036 HEMOGLOBIN GLYCOSYLATED A1C: CPT | Mod: HCNC | Performed by: FAMILY MEDICINE

## 2023-03-08 PROCEDURE — 82306 VITAMIN D 25 HYDROXY: CPT | Mod: HCNC | Performed by: FAMILY MEDICINE

## 2023-03-08 PROCEDURE — 80061 LIPID PANEL: CPT | Mod: HCNC | Performed by: FAMILY MEDICINE

## 2023-03-09 LAB
25(OH)D3+25(OH)D2 SERPL-MCNC: 54 NG/ML (ref 30–96)
CHOLEST SERPL-MCNC: 259 MG/DL (ref 120–199)
CHOLEST/HDLC SERPL: 5.1 {RATIO} (ref 2–5)
ESTIMATED AVG GLUCOSE: 105 MG/DL (ref 68–131)
HBA1C MFR BLD: 5.3 % (ref 4–5.6)
HDLC SERPL-MCNC: 51 MG/DL (ref 40–75)
HDLC SERPL: 19.7 % (ref 20–50)
LDLC SERPL CALC-MCNC: 182.6 MG/DL (ref 63–159)
NONHDLC SERPL-MCNC: 208 MG/DL
TRIGL SERPL-MCNC: 127 MG/DL (ref 30–150)

## 2023-04-13 ENCOUNTER — TELEPHONE (OUTPATIENT)
Dept: ADMINISTRATIVE | Facility: HOSPITAL | Age: 63
End: 2023-04-13
Payer: MEDICARE

## 2023-04-13 ENCOUNTER — PATIENT MESSAGE (OUTPATIENT)
Dept: ADMINISTRATIVE | Facility: HOSPITAL | Age: 63
End: 2023-04-13
Payer: MEDICARE

## 2023-04-18 ENCOUNTER — HOSPITAL ENCOUNTER (OUTPATIENT)
Dept: RADIOLOGY | Facility: HOSPITAL | Age: 63
Discharge: HOME | End: 2023-04-18
Attending: UROLOGY
Payer: MEDICARE

## 2023-04-18 ENCOUNTER — OFFICE VISIT (OUTPATIENT)
Dept: UROLOGY | Facility: CLINIC | Age: 63
End: 2023-04-18
Payer: MEDICARE

## 2023-04-18 VITALS
BODY MASS INDEX: 31.99 KG/M2 | HEIGHT: 63 IN | DIASTOLIC BLOOD PRESSURE: 75 MMHG | WEIGHT: 180.56 LBS | HEART RATE: 79 BPM | SYSTOLIC BLOOD PRESSURE: 116 MMHG

## 2023-04-18 DIAGNOSIS — N20.0 KIDNEY STONES: ICD-10-CM

## 2023-04-18 DIAGNOSIS — N39.46 MIXED INCONTINENCE: Primary | ICD-10-CM

## 2023-04-18 PROCEDURE — 99999 PR PBB SHADOW E&M-EST. PATIENT-LVL III: CPT | Mod: PBBFAC,HCNC,, | Performed by: UROLOGY

## 2023-04-18 PROCEDURE — 3044F HG A1C LEVEL LT 7.0%: CPT | Mod: HCNC,CPTII,S$GLB, | Performed by: UROLOGY

## 2023-04-18 PROCEDURE — 74018 RADEX ABDOMEN 1 VIEW: CPT | Mod: TC,HCNC,PN

## 2023-04-18 PROCEDURE — 99999 PR PBB SHADOW E&M-EST. PATIENT-LVL III: ICD-10-PCS | Mod: PBBFAC,HCNC,, | Performed by: UROLOGY

## 2023-04-18 PROCEDURE — 99214 PR OFFICE/OUTPT VISIT, EST, LEVL IV, 30-39 MIN: ICD-10-PCS | Mod: HCNC,S$GLB,, | Performed by: UROLOGY

## 2023-04-18 PROCEDURE — 3078F PR MOST RECENT DIASTOLIC BLOOD PRESSURE < 80 MM HG: ICD-10-PCS | Mod: HCNC,CPTII,S$GLB, | Performed by: UROLOGY

## 2023-04-18 PROCEDURE — 3008F BODY MASS INDEX DOCD: CPT | Mod: HCNC,CPTII,S$GLB, | Performed by: UROLOGY

## 2023-04-18 PROCEDURE — 3078F DIAST BP <80 MM HG: CPT | Mod: HCNC,CPTII,S$GLB, | Performed by: UROLOGY

## 2023-04-18 PROCEDURE — 3008F PR BODY MASS INDEX (BMI) DOCUMENTED: ICD-10-PCS | Mod: HCNC,CPTII,S$GLB, | Performed by: UROLOGY

## 2023-04-18 PROCEDURE — 99214 OFFICE O/P EST MOD 30 MIN: CPT | Mod: HCNC,S$GLB,, | Performed by: UROLOGY

## 2023-04-18 PROCEDURE — 3044F PR MOST RECENT HEMOGLOBIN A1C LEVEL <7.0%: ICD-10-PCS | Mod: HCNC,CPTII,S$GLB, | Performed by: UROLOGY

## 2023-04-18 PROCEDURE — 3074F SYST BP LT 130 MM HG: CPT | Mod: HCNC,CPTII,S$GLB, | Performed by: UROLOGY

## 2023-04-18 PROCEDURE — 74018 RADEX ABDOMEN 1 VIEW: CPT | Mod: 26,HCNC,, | Performed by: STUDENT IN AN ORGANIZED HEALTH CARE EDUCATION/TRAINING PROGRAM

## 2023-04-18 PROCEDURE — 74018 XR ABDOMEN AP 1 VIEW: ICD-10-PCS | Mod: 26,HCNC,, | Performed by: STUDENT IN AN ORGANIZED HEALTH CARE EDUCATION/TRAINING PROGRAM

## 2023-04-18 PROCEDURE — 3074F PR MOST RECENT SYSTOLIC BLOOD PRESSURE < 130 MM HG: ICD-10-PCS | Mod: HCNC,CPTII,S$GLB, | Performed by: UROLOGY

## 2023-04-18 RX ORDER — OXYBUTYNIN CHLORIDE 10 MG/1
10 TABLET, EXTENDED RELEASE ORAL DAILY
Qty: 90 TABLET | Refills: 4 | Status: SHIPPED | OUTPATIENT
Start: 2023-04-18 | End: 2024-04-17

## 2023-04-18 NOTE — PROGRESS NOTES
Chief Complaint   Patient presents with    Follow-up         History of Present Illness:   Melita Romero is a 62 y.o. female here for evaluation of Follow-up    4/18/23-oxybutynin helps significantly. Still wears 1 pad a day just in case. She has learned to not wait to go. No recent stone passage. She does have some right sided pain, unsure of etiology. No gross hematuria. No constipation.   KUB today personally reviewed, showing no obvious stone.   1/11/22-Pt reports h/o urolithiasis. She does take chronic pain medication, and states that she may not feel the pain. However, she does report sometimes she has bladder pain and right flank pain. She also has ADDY with sneeze, cough, run and laugh. She wears 2 pads per day. She does have some UUI first thing in the am. Incontinence has been ongoing for a year or so. Maybe more often now. She reports that a long time ago, she was on a bladder medication, but stopped it. She states that it did help. UUI>ADDY. No gross hematuria. Sometimes she has frequency up to 50 times a day.         Review of Systems   Constitutional:  Negative for chills and fever.   Respiratory:  Negative for shortness of breath.    Cardiovascular:  Negative for chest pain.   Gastrointestinal:  Negative for constipation and diarrhea.   Genitourinary:  Negative for difficulty urinating.   All other systems reviewed and are negative.    Past Medical History:   Diagnosis Date    Abnormal glucose     Anxiety     Arthritis     Back pain     Controlled type 2 diabetes mellitus without complication, without long-term current use of insulin 2/27/2017    DDD (degenerative disc disease), lumbar     Depression     Diverticulosis of colon 1/9/2020    Fibromyalgia     Flank pain 9/13/2021    History of colon polyps 11/6/2019    Hyperlipidemia     Hypertension     Thyroid disease     Tobacco dependence     Trouble in sleeping     Urinary incontinence     wears pads daily       Past Surgical History:    Procedure Laterality Date    COLONOSCOPY N/A 2020    Procedure: COLONOSCOPY;  Surgeon: Jacob Lauren MD;  Location: [facility];  Service: Endoscopy;  Laterality: N/A;    HYSTERECTOMY  2003    complete    OOPHORECTOMY Bilateral     SHOULDER ARTHROSCOPY Right 2017    Dr. Nima Coyne    TONSILLECTOMY         Family History   Problem Relation Age of Onset    Glaucoma Mother     Diabetes Mother     Hypertension Brother     Diabetes Brother     Heart disease Maternal Uncle        Social History     Tobacco Use    Smoking status: Every Day     Packs/day: 0.50     Types: Cigarettes     Last attempt to quit: 2020     Years since quittin.7    Smokeless tobacco: Never    Tobacco comments:     I quit a year, started smoking again.   Substance Use Topics    Alcohol use: Yes     Alcohol/week: 0.0 standard drinks     Comment: rarely glass of wine    Drug use: No       Current Outpatient Medications   Medication Sig Dispense Refill    aspirin 81 mg Tab Take 1 tablet by mouth Daily.      atorvastatin (LIPITOR) 40 MG tablet Take 1 tablet (40 mg total) by mouth once daily. 90 tablet 1    cholecalciferol, vitamin D3, 2,000 unit Tab Take 1 tablet by mouth Daily.      clonazePAM (KLONOPIN) 0.5 MG tablet Take 1 tablet (0.5 mg total) by mouth daily as needed for Anxiety. 30 tablet 2    hydrocodone-acetaminophen 10-325mg (NORCO)  mg Tab Take 1 tablet by mouth every 8 (eight) hours as needed. 75 tablet 0    hydrOXYzine pamoate (VISTARIL) 25 MG Cap TAKE 1 CAPSULE BY MOUTH EVERY 8 (EIGHT) HOURS AS NEEDED.TAKE 1 CAPSULE BY MOUTH EVERY 8 (EIGHT) HOURS AS NEEDED. 60 capsule 2    losartan-hydrochlorothiazide 100-12.5 mg (HYZAAR) 100-12.5 mg Tab Take 1 tablet by mouth once daily. 90 tablet 1    metFORMIN (GLUCOPHAGE-XR) 500 MG ER 24hr tablet Take 1 tablet (500 mg total) by mouth 2 (two) times daily with meals. 180 tablet 1    multivitamin-Ca-iron-minerals 18-0.4 mg Tab Take 1 tablet by mouth Daily.      naloxone  (NARCAN) 4 mg/actuation Spry       pantoprazole (PROTONIX) 40 MG tablet Take 1 tablet (40 mg total) by mouth once daily. 90 tablet 1    sertraline (ZOLOFT) 100 MG tablet Take 2 tablets (200 mg total) by mouth once daily. 60 tablet 2    tiZANidine (ZANAFLEX) 4 MG tablet Take 1 tablet (4 mg total) by mouth every 8 (eight) hours. 90 tablet 1    WOMEN'S DAILY FORMULA 18 mg iron-400 mcg-500 mg Ca Tab       oxybutynin (DITROPAN-XL) 10 MG 24 hr tablet Take 1 tablet (10 mg total) by mouth once daily. 90 tablet 4     No current facility-administered medications for this visit.       Review of patient's allergies indicates:   Allergen Reactions    Fish containing products Other (See Comments)     Difficulty breathing    Iodine Other (See Comments)    Shellfish containing products      Other reaction(s): Difficulty breathing  Other reaction(s): Difficulty breathing    Latex Rash       Physical Exam  Vitals:    04/18/23 1356   BP: 116/75   Pulse: 79     General: Well-developed, well-nourished, in no acute distress  HEENT: Normocephalic, atraumatic, extraocular movements intact  Neck: Supple, no supraclavicular or cervical lymphadenopathy, trachea midline  Respirations: even and unlabored  Back: midline spine, No CVA tenderness  Abdomen: soft, Non-tender, non-distended, no palpable masses, no rebound or guarding, well-healed pfannensteil scar  : 1/11/22-Normal external female genitalia without lesions. Orthotopic urethral meatus. atrophic vaginal mucosa. No Cysotcele, Grade 2 Rectocele, No apical prolapse, + cough stress test, no significant urethral hypermobility  Extremities: moves all equally, no clubbing, cyanosis or edema  Skin: Warm and dry. No lesions  Psych: normal affect  Neuro: Alert and oriented x 3. Cranial nerves II-XII intact      Urinalysis  pH, UA   Date Value Ref Range Status   01/11/2022 7  Final   09/13/2021 7.0 5.0 - 8.0 Final     Protein, UA   Date Value Ref Range Status   09/13/2021 Negative Negative  Final     Comment:     Recommend a 24 hour urine protein or a urine   protein/creatinine ratio if globulin induced proteinuria is  clinically suspected.       Glucose, UA   Date Value Ref Range Status   09/13/2021 Negative Negative Final     Occult Blood UA   Date Value Ref Range Status   09/13/2021 Negative Negative Final     Nitrite, UA   Date Value Ref Range Status   01/11/2022 -  Final   09/13/2021 Negative Negative Final     Leukocytes, UA   Date Value Ref Range Status   09/13/2021 Negative Negative Final       Assessment:  1. Mixed incontinence        2. Kidney stones  X-Ray Abdomen AP 1 View    X-Ray Abdomen AP 1 View              Plan:   Mixed incontinence    Kidney stones  -     X-Ray Abdomen AP 1 View; Future; Expected date: 04/18/2023  -     X-Ray Abdomen AP 1 View; Future; Expected date: 04/18/2024    Other orders  -     oxybutynin (DITROPAN-XL) 10 MG 24 hr tablet; Take 1 tablet (10 mg total) by mouth once daily.  Dispense: 90 tablet; Refill: 4        Follow up in about 1 year (around 4/18/2024) for KUB before visit.

## 2023-04-19 ENCOUNTER — PATIENT MESSAGE (OUTPATIENT)
Dept: ADMINISTRATIVE | Facility: HOSPITAL | Age: 63
End: 2023-04-19
Payer: MEDICARE

## 2023-05-30 ENCOUNTER — OFFICE VISIT (OUTPATIENT)
Dept: PSYCHIATRY | Facility: CLINIC | Age: 63
End: 2023-05-30
Payer: MEDICARE

## 2023-05-30 DIAGNOSIS — G47.00 INSOMNIA, UNSPECIFIED TYPE: ICD-10-CM

## 2023-05-30 DIAGNOSIS — F32.A CHRONIC DEPRESSION: ICD-10-CM

## 2023-05-30 DIAGNOSIS — F41.1 GENERALIZED ANXIETY DISORDER: Primary | ICD-10-CM

## 2023-05-30 PROCEDURE — 99214 PR OFFICE/OUTPT VISIT, EST, LEVL IV, 30-39 MIN: ICD-10-PCS | Mod: 95,,, | Performed by: PSYCHIATRY & NEUROLOGY

## 2023-05-30 PROCEDURE — 3044F HG A1C LEVEL LT 7.0%: CPT | Mod: CPTII,95,, | Performed by: PSYCHIATRY & NEUROLOGY

## 2023-05-30 PROCEDURE — 99214 OFFICE O/P EST MOD 30 MIN: CPT | Mod: 95,,, | Performed by: PSYCHIATRY & NEUROLOGY

## 2023-05-30 PROCEDURE — 3044F PR MOST RECENT HEMOGLOBIN A1C LEVEL <7.0%: ICD-10-PCS | Mod: CPTII,95,, | Performed by: PSYCHIATRY & NEUROLOGY

## 2023-05-30 RX ORDER — CLONAZEPAM 0.5 MG/1
0.5 TABLET ORAL DAILY PRN
Qty: 30 TABLET | Refills: 2 | Status: SHIPPED | OUTPATIENT
Start: 2023-05-30 | End: 2023-09-05 | Stop reason: SDUPTHER

## 2023-05-30 RX ORDER — DESVENLAFAXINE SUCCINATE 50 MG/1
50 TABLET, EXTENDED RELEASE ORAL DAILY
Qty: 30 TABLET | Refills: 3 | Status: SHIPPED | OUTPATIENT
Start: 2023-05-30 | End: 2023-09-30
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
## 2023-06-01 ENCOUNTER — PATIENT MESSAGE (OUTPATIENT)
Dept: PSYCHIATRY | Facility: CLINIC | Age: 63
End: 2023-06-01
Payer: MEDICARE

## 2023-06-01 ENCOUNTER — OFFICE VISIT (OUTPATIENT)
Dept: PSYCHIATRY | Facility: CLINIC | Age: 63
End: 2023-06-01
Payer: MEDICARE

## 2023-06-01 DIAGNOSIS — F33.1 MODERATE EPISODE OF RECURRENT MAJOR DEPRESSIVE DISORDER: ICD-10-CM

## 2023-06-01 DIAGNOSIS — F41.1 GENERALIZED ANXIETY DISORDER: Primary | ICD-10-CM

## 2023-06-01 PROCEDURE — 90834 PR PSYCHOTHERAPY W/PATIENT, 45 MIN: ICD-10-PCS | Mod: 95,,, | Performed by: SOCIAL WORKER

## 2023-06-01 PROCEDURE — 1159F PR MEDICATION LIST DOCUMENTED IN MEDICAL RECORD: ICD-10-PCS | Mod: CPTII,95,, | Performed by: SOCIAL WORKER

## 2023-06-01 PROCEDURE — 1159F MED LIST DOCD IN RCRD: CPT | Mod: CPTII,95,, | Performed by: SOCIAL WORKER

## 2023-06-01 PROCEDURE — 90834 PSYTX W PT 45 MINUTES: CPT | Mod: 95,,, | Performed by: SOCIAL WORKER

## 2023-06-01 PROCEDURE — 3044F HG A1C LEVEL LT 7.0%: CPT | Mod: CPTII,95,, | Performed by: SOCIAL WORKER

## 2023-06-01 PROCEDURE — 3044F PR MOST RECENT HEMOGLOBIN A1C LEVEL <7.0%: ICD-10-PCS | Mod: CPTII,95,, | Performed by: SOCIAL WORKER

## 2023-06-01 RX ORDER — SERTRALINE HYDROCHLORIDE 100 MG/1
200 TABLET, FILM COATED ORAL DAILY
Qty: 60 TABLET | Refills: 0 | Status: SHIPPED | OUTPATIENT
Start: 2023-06-01 | End: 2023-10-10

## 2023-06-01 NOTE — PROGRESS NOTES
"Individual Psychotherapy Follow-up Visit Progress Note (PhD/LCSW)     Outpatient Psychotherapy - 45 minutes with patient (38-52 minutes) - [id number]     Date: 06/01/2023    Visit Type: Virtual Visit with synchronous video/audio    Pt's confirmed location at the time of visit: home/residence in [place].    Due to the nature of this visit type, a virtual visit with synchronous audio and video, each patient to whom this provider administers behavioral health services by telemedicine is: (1) informed of the relationship between the provider and patient and the respective role of any other health care provider with respect to management of the patient; and (2) notified that he or she may decline to receive services by telemedicine and may withdraw from such care at any time.    The patient was informed of the following:     Provider's contact info:  [facility]  [id number] [place] [address], [address] [place] [address]  (Phone) [phone]    If technology issues occur, call office phone: Ph: [phone]  If crisis: Dial 911 or go to nearest Emergency Room (ER)  If questions related to privacy practices: contact [facility]: [phone]    Crisis Disclaimer: Patient was informed that due to the virtual nature of the visit, that if a crisis develops, protocols will be implemented to ensure patient safety, including but not limited to: 1) Initiating a welfare check with local law enforcement and/or 2) Calling 911    6/1/2023  MRN: [id number]  Primary Care Provider: Teofilo Morton MD    Melita Romero is a 62 y.o. female who presents today for follow-up of depression and anxiety. Met with patient.      Preferred Name: Melita     Subjective:     Last encounter (with this provider): 9/30/2020     Content of Current Session: Pt returning to therapy after almost 3 years. She states she was "aggravated during the first visit because I didn't want to talk " "about my childhood." States she was encouraged by her psychiatrist, Dr Gutierrez, to resume counseling. "So much has happened, I have all this stuff in my head and I don't know where to start. I need to get my life together. I have no feelings of suicide, because I know that I'm needed because I have family. I struggle to take a bath, and I shouldn't." Rates her depression 6/10 over the past 2 weeks. Her son lives next door, and pt helps with caring for her 4 y/o grandson. Son is a single father and also has a 15 y/o daughter. Pt states she often plays games on her phone for hours "because I have to finish that, yet I can't do anything else." She engages in a lot of negative self-talk. "I feel like I'm never enough. I've always wanted to be someone else besides me." She was having daily crying spells until about 6 months ago when she resumed care with Dr Gutierrez. She reports her house is very cluttered. She has chronic pain due to arthritis. She has difficulty managing her time and organizing things. "I can't make decisions. Sometimes I have trouble managing my money." She is a month behind on a couple of bills. Discussed coping strategies to manage depression and anxiety. Helped pt begin to identify maladaptive thought patterns.    Therapeutic Interventions Utilized During Current Session: Cognitive Behavioral Therapy, Supportive Therapy      Objective:       Mental Status Evaluation  Appearance: unremarkable, age appropriate  Behavior: normal, cooperative  Speech: normal tone, normal rate, normal pitch, normal volume  Mood: anxious, depressed  Affect: congruent and appropriate, blunted  Thought Process: normal and logical  Thought Content: normal, no suicidality, no homicidality, delusions, or paranoia  Sensorium: grossly intact  Cognition: grossly intact  Insight: fair  Judgment: adequate to circumstances    Risk parameters:  Patient reports no suicidal ideation  Patient reports no homicidal " ideation  Patient reports no self-injurious behavior  Patient reports no violent behavior      Assessment & Plan:     The patient's response to the interventions is accepting    The patient's progress toward treatment goals is fair     Homework assigned: daily journaling and write down 3 goals each morning, increase use of available supports    Treatment plan:   A. Target symptoms: Depression, Anxiety, and Poor Coping Skills   B. Therapeutic modalities: insight oriented psychotherapy, behavior modifying psychotherapy, supportive psychotherapy  C. Why chosen therapy is appropriate versus another modality: relevant to diagnosis   D. Outcome monitoring methods: self report, observation, rating scales, feedback from clinical staff      Visit Diagnosis:   1. Generalized anxiety disorder    2. Moderate episode of recurrent major depressive disorder        Follow-up: individual psychotherapy and medication management by physician    Return to Clinic: as scheduled  Pt Reported to Schedule Self via Epic EMR MyChart Application and/or Department Support Staff    Beth Torres LCSW-KATIANA

## 2023-08-03 RX ORDER — TIZANIDINE 4 MG/1
4 TABLET ORAL EVERY 8 HOURS
Qty: 90 TABLET | Refills: 0 | Status: SHIPPED | OUTPATIENT
Start: 2023-08-03 | End: 2023-09-05 | Stop reason: SDUPTHER

## 2023-09-05 RX ORDER — LOSARTAN POTASSIUM AND HYDROCHLOROTHIAZIDE 12.5; 1 MG/1; MG/1
1 TABLET ORAL DAILY
Qty: 30 TABLET | Refills: 0 | Status: SHIPPED | OUTPATIENT
Start: 2023-09-05 | End: 2023-09-18 | Stop reason: SDUPTHER

## 2023-09-05 RX ORDER — CLONAZEPAM 0.5 MG/1
0.5 TABLET ORAL DAILY PRN
Qty: 30 TABLET | Refills: 0 | Status: SHIPPED | OUTPATIENT
Start: 2023-09-05 | End: 2023-10-30

## 2023-09-05 RX ORDER — TIZANIDINE 4 MG/1
4 TABLET ORAL EVERY 8 HOURS
Qty: 90 TABLET | Refills: 0 | Status: SHIPPED | OUTPATIENT
Start: 2023-09-05 | End: 2023-09-18 | Stop reason: SDUPTHER

## 2023-09-18 ENCOUNTER — LAB VISIT (OUTPATIENT)
Dept: LAB | Facility: HOSPITAL | Age: 63
End: 2023-09-18
Attending: FAMILY MEDICINE
Payer: MEDICARE

## 2023-09-18 ENCOUNTER — OFFICE VISIT (OUTPATIENT)
Dept: INTERNAL MEDICINE | Facility: CLINIC | Age: 63
End: 2023-09-18
Payer: MEDICARE

## 2023-09-18 VITALS
SYSTOLIC BLOOD PRESSURE: 112 MMHG | WEIGHT: 179 LBS | BODY MASS INDEX: 31.71 KG/M2 | HEART RATE: 68 BPM | TEMPERATURE: 98 F | DIASTOLIC BLOOD PRESSURE: 80 MMHG

## 2023-09-18 DIAGNOSIS — Z12.31 SCREENING MAMMOGRAM, ENCOUNTER FOR: ICD-10-CM

## 2023-09-18 DIAGNOSIS — E11.69 HYPERLIPIDEMIA ASSOCIATED WITH TYPE 2 DIABETES MELLITUS: ICD-10-CM

## 2023-09-18 DIAGNOSIS — I15.2 HYPERTENSION ASSOCIATED WITH DIABETES: ICD-10-CM

## 2023-09-18 DIAGNOSIS — Z00.00 WELLNESS EXAMINATION: Primary | ICD-10-CM

## 2023-09-18 DIAGNOSIS — E78.5 HYPERLIPIDEMIA ASSOCIATED WITH TYPE 2 DIABETES MELLITUS: ICD-10-CM

## 2023-09-18 DIAGNOSIS — E11.36 TYPE 2 DIABETES MELLITUS WITH DIABETIC CATARACT, WITHOUT LONG-TERM CURRENT USE OF INSULIN: ICD-10-CM

## 2023-09-18 DIAGNOSIS — E11.59 HYPERTENSION ASSOCIATED WITH DIABETES: ICD-10-CM

## 2023-09-18 DIAGNOSIS — E55.9 VITAMIN D DEFICIENCY: ICD-10-CM

## 2023-09-18 LAB
ALBUMIN/CREAT UR: NORMAL UG/MG (ref 0–30)
CREAT UR-MCNC: 29 MG/DL (ref 15–325)
MICROALBUMIN UR DL<=1MG/L-MCNC: <5 UG/ML

## 2023-09-18 PROCEDURE — 3079F PR MOST RECENT DIASTOLIC BLOOD PRESSURE 80-89 MM HG: ICD-10-PCS | Mod: HCNC,CPTII,S$GLB, | Performed by: FAMILY MEDICINE

## 2023-09-18 PROCEDURE — 3079F DIAST BP 80-89 MM HG: CPT | Mod: HCNC,CPTII,S$GLB, | Performed by: FAMILY MEDICINE

## 2023-09-18 PROCEDURE — 99396 PREV VISIT EST AGE 40-64: CPT | Mod: HCNC,S$GLB,, | Performed by: FAMILY MEDICINE

## 2023-09-18 PROCEDURE — 3044F PR MOST RECENT HEMOGLOBIN A1C LEVEL <7.0%: ICD-10-PCS | Mod: HCNC,CPTII,S$GLB, | Performed by: FAMILY MEDICINE

## 2023-09-18 PROCEDURE — 99999 PR PBB SHADOW E&M-EST. PATIENT-LVL III: ICD-10-PCS | Mod: PBBFAC,HCNC,, | Performed by: FAMILY MEDICINE

## 2023-09-18 PROCEDURE — 99999 PR PBB SHADOW E&M-EST. PATIENT-LVL III: CPT | Mod: PBBFAC,HCNC,, | Performed by: FAMILY MEDICINE

## 2023-09-18 PROCEDURE — 1159F PR MEDICATION LIST DOCUMENTED IN MEDICAL RECORD: ICD-10-PCS | Mod: HCNC,CPTII,S$GLB, | Performed by: FAMILY MEDICINE

## 2023-09-18 PROCEDURE — 3008F PR BODY MASS INDEX (BMI) DOCUMENTED: ICD-10-PCS | Mod: HCNC,CPTII,S$GLB, | Performed by: FAMILY MEDICINE

## 2023-09-18 PROCEDURE — 3074F PR MOST RECENT SYSTOLIC BLOOD PRESSURE < 130 MM HG: ICD-10-PCS | Mod: HCNC,CPTII,S$GLB, | Performed by: FAMILY MEDICINE

## 2023-09-18 PROCEDURE — 3074F SYST BP LT 130 MM HG: CPT | Mod: HCNC,CPTII,S$GLB, | Performed by: FAMILY MEDICINE

## 2023-09-18 PROCEDURE — 3044F HG A1C LEVEL LT 7.0%: CPT | Mod: HCNC,CPTII,S$GLB, | Performed by: FAMILY MEDICINE

## 2023-09-18 PROCEDURE — 1159F MED LIST DOCD IN RCRD: CPT | Mod: HCNC,CPTII,S$GLB, | Performed by: FAMILY MEDICINE

## 2023-09-18 PROCEDURE — 3008F BODY MASS INDEX DOCD: CPT | Mod: HCNC,CPTII,S$GLB, | Performed by: FAMILY MEDICINE

## 2023-09-18 PROCEDURE — 82570 ASSAY OF URINE CREATININE: CPT | Mod: HCNC | Performed by: FAMILY MEDICINE

## 2023-09-18 PROCEDURE — 99396 PR PREVENTIVE VISIT,EST,40-64: ICD-10-PCS | Mod: HCNC,S$GLB,, | Performed by: FAMILY MEDICINE

## 2023-09-18 RX ORDER — LOSARTAN POTASSIUM AND HYDROCHLOROTHIAZIDE 12.5; 1 MG/1; MG/1
1 TABLET ORAL DAILY
Qty: 90 TABLET | Refills: 1 | Status: SHIPPED | OUTPATIENT
Start: 2023-09-18

## 2023-09-18 RX ORDER — PANTOPRAZOLE SODIUM 40 MG/1
40 TABLET, DELAYED RELEASE ORAL DAILY
Qty: 90 TABLET | Refills: 1 | Status: SHIPPED | OUTPATIENT
Start: 2023-09-18

## 2023-09-18 RX ORDER — TIZANIDINE 4 MG/1
4 TABLET ORAL EVERY 8 HOURS
Qty: 90 TABLET | Refills: 0 | Status: SHIPPED | OUTPATIENT
Start: 2023-09-18 | End: 2023-11-27

## 2023-09-18 RX ORDER — METFORMIN HYDROCHLORIDE 500 MG/1
500 TABLET, EXTENDED RELEASE ORAL 2 TIMES DAILY WITH MEALS
Qty: 180 TABLET | Refills: 1 | Status: SHIPPED | OUTPATIENT
Start: 2023-09-18

## 2023-09-18 RX ORDER — ATORVASTATIN CALCIUM 40 MG/1
40 TABLET, FILM COATED ORAL DAILY
Qty: 90 TABLET | Refills: 1 | Status: SHIPPED | OUTPATIENT
Start: 2023-09-18

## 2023-09-18 NOTE — PROGRESS NOTES
Subjective:       Patient ID: Melita Romero is a 62 y.o. female.    Chief Complaint: Diabetes    Melita Romero is a 62 y.o. female and is here for a comprehensive physical exam.    Do you take any herbs or supplements that were not prescribed by a doctor? no  Are you taking calcium supplements? no  Are you taking aspirin daily? yes     History:  Any STD's in the past? none    The following portions of the patient's history were reviewed and updated as appropriate: allergies, current medications, past family history, past medical history, past social history, past surgical history and problem list.    Review of Systems  Do you have pain that bothers you in your daily life? Back, teeth,   Pertinent items are noted in HPI.      2. Patient Counseling:  --Nutrition: Stressed importance of moderation in sodium/caffeine intake, saturated fat and cholesterol, caloric balance.  --Exercise: Stressed the importance of regular exercise.   --Substance Abuse: Discussed cessation/primary prevention of tobacco, alcohol - nonuser   --Sexuality: Discussed sexually transmitted disease.  --Injury prevention: Discussed safety belts, smoke detector.   --Dental health: Discussed dental health.  --Immunizations reviewed.    3. Discussed the patient's BMI.  4. Follow up as needed for acute illness          Review of Systems   Constitutional:  Negative for activity change and unexpected weight change.   HENT:  Negative for hearing loss, rhinorrhea and trouble swallowing.    Eyes:  Negative for discharge and visual disturbance.   Respiratory:  Negative for chest tightness and wheezing.    Cardiovascular:  Negative for chest pain and palpitations.   Gastrointestinal:  Negative for blood in stool, constipation, diarrhea and vomiting.   Endocrine: Negative for polydipsia and polyuria.   Genitourinary:  Negative for difficulty urinating, dysuria, hematuria and menstrual problem.   Musculoskeletal:  Positive for arthralgias. Negative  for joint swelling and neck pain.   Neurological:  Positive for headaches. Negative for weakness.   Psychiatric/Behavioral:  Positive for dysphoric mood. Negative for confusion.        Objective:      Physical Exam  Vitals and nursing note reviewed.   Constitutional:       General: She is not in acute distress.     Appearance: Normal appearance. She is well-developed. She is obese. She is not diaphoretic.   HENT:      Head: Normocephalic and atraumatic.      Comments: Small cyst of right ear  Eyes:      General: No scleral icterus.     Conjunctiva/sclera: Conjunctivae normal.   Cardiovascular:      Rate and Rhythm: Normal rate and regular rhythm.   Pulmonary:      Effort: Pulmonary effort is normal. No respiratory distress.      Breath sounds: Normal breath sounds. No wheezing.   Abdominal:      General: There is no distension.      Palpations: Abdomen is soft.      Tenderness: There is no abdominal tenderness. There is no guarding.   Skin:     General: Skin is warm and dry.      Coloration: Skin is not pale.      Findings: No erythema or rash.      Comments: Good turgor   Neurological:      Mental Status: She is alert.   Psychiatric:         Mood and Affect: Mood normal.         Thought Content: Thought content normal.         Assessment:       1. Wellness examination    2. Hyperlipidemia associated with type 2 diabetes mellitus    3. Hypertension associated with diabetes    4. Screening mammogram, encounter for    5. Vitamin D deficiency    6. Type 2 diabetes mellitus with diabetic cataract, without long-term current use of insulin        Plan:     Problem List Items Addressed This Visit          Cardiac/Vascular    Hypertension associated with diabetes    Overview     Chronic, Stable, cont losartan-hctz         Relevant Medications    metFORMIN (GLUCOPHAGE-XR) 500 MG ER 24hr tablet    Other Relevant Orders    CBC Auto Differential    Comprehensive Metabolic Panel    Hemoglobin A1C    Ferritin    Iron and TIBC     Lipid Panel    TSH    Microalbumin/Creatinine Ratio, Urine    Hyperlipidemia associated with type 2 diabetes mellitus    Overview     Chronic, Stable, cont statin         Relevant Medications    metFORMIN (GLUCOPHAGE-XR) 500 MG ER 24hr tablet    Other Relevant Orders    Ferritin    Iron and TIBC       Renal/    Screening mammogram, encounter for       Endocrine    Type 2 diabetes mellitus with diabetic cataract, without long-term current use of insulin    Overview     Chronic, Stable, cont metformin         Relevant Medications    atorvastatin (LIPITOR) 40 MG tablet    metFORMIN (GLUCOPHAGE-XR) 500 MG ER 24hr tablet       Other    Wellness examination - Primary    Relevant Orders    CBC Auto Differential    Comprehensive Metabolic Panel    Hemoglobin A1C    Ferritin    Iron and TIBC    Lipid Panel    TSH     Other Visit Diagnoses       Vitamin D deficiency        Relevant Orders    Vitamin D

## 2023-09-22 DIAGNOSIS — E83.52 HYPERCALCEMIA: Primary | ICD-10-CM

## 2023-09-26 ENCOUNTER — OFFICE VISIT (OUTPATIENT)
Dept: PSYCHIATRY | Facility: CLINIC | Age: 63
End: 2023-09-26
Payer: MEDICARE

## 2023-09-26 DIAGNOSIS — F32.A CHRONIC DEPRESSION: ICD-10-CM

## 2023-09-26 DIAGNOSIS — F41.1 GENERALIZED ANXIETY DISORDER: Primary | ICD-10-CM

## 2023-09-26 PROCEDURE — 3061F PR NEG MICROALBUMINURIA RESULT DOCUMENTED/REVIEW: ICD-10-PCS | Mod: HCNC,CPTII,95, | Performed by: PSYCHIATRY & NEUROLOGY

## 2023-09-26 PROCEDURE — 3066F PR DOCUMENTATION OF TREATMENT FOR NEPHROPATHY: ICD-10-PCS | Mod: HCNC,CPTII,95, | Performed by: PSYCHIATRY & NEUROLOGY

## 2023-09-26 PROCEDURE — 3061F NEG MICROALBUMINURIA REV: CPT | Mod: HCNC,CPTII,95, | Performed by: PSYCHIATRY & NEUROLOGY

## 2023-09-26 PROCEDURE — 99214 PR OFFICE/OUTPT VISIT, EST, LEVL IV, 30-39 MIN: ICD-10-PCS | Mod: HCNC,95,, | Performed by: PSYCHIATRY & NEUROLOGY

## 2023-09-26 PROCEDURE — 99214 OFFICE O/P EST MOD 30 MIN: CPT | Mod: HCNC,95,, | Performed by: PSYCHIATRY & NEUROLOGY

## 2023-09-26 PROCEDURE — 3044F HG A1C LEVEL LT 7.0%: CPT | Mod: HCNC,CPTII,95, | Performed by: PSYCHIATRY & NEUROLOGY

## 2023-09-26 PROCEDURE — 3066F NEPHROPATHY DOC TX: CPT | Mod: HCNC,CPTII,95, | Performed by: PSYCHIATRY & NEUROLOGY

## 2023-09-26 PROCEDURE — 3044F PR MOST RECENT HEMOGLOBIN A1C LEVEL <7.0%: ICD-10-PCS | Mod: HCNC,CPTII,95, | Performed by: PSYCHIATRY & NEUROLOGY
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
## 2023-09-28 DIAGNOSIS — I51.89 LEFT VENTRICULAR DIASTOLIC DYSFUNCTION WITH PRESERVED SYSTOLIC FUNCTION: Primary | ICD-10-CM

## 2023-09-30 RX ORDER — DESVENLAFAXINE 100 MG/1
100 TABLET, EXTENDED RELEASE ORAL DAILY
Qty: 30 TABLET | Refills: 3 | Status: SHIPPED | OUTPATIENT
Start: 2023-09-30 | End: 2024-01-25

## 2023-10-09 ENCOUNTER — HOSPITAL ENCOUNTER (OUTPATIENT)
Dept: CARDIOLOGY | Facility: HOSPITAL | Age: 63
Discharge: HOME | End: 2023-10-09
Attending: INTERNAL MEDICINE
Payer: MEDICARE

## 2023-10-09 ENCOUNTER — TELEPHONE (OUTPATIENT)
Dept: INTERNAL MEDICINE | Facility: CLINIC | Age: 63
End: 2023-10-09
Payer: MEDICARE

## 2023-10-09 ENCOUNTER — OFFICE VISIT (OUTPATIENT)
Dept: CARDIOLOGY | Facility: CLINIC | Age: 63
End: 2023-10-09
Payer: MEDICARE

## 2023-10-09 VITALS
BODY MASS INDEX: 31.21 KG/M2 | SYSTOLIC BLOOD PRESSURE: 135 MMHG | HEART RATE: 82 BPM | DIASTOLIC BLOOD PRESSURE: 85 MMHG | OXYGEN SATURATION: 97 % | HEIGHT: 63 IN | WEIGHT: 176.13 LBS

## 2023-10-09 DIAGNOSIS — I15.2 HYPERTENSION ASSOCIATED WITH DIABETES: ICD-10-CM

## 2023-10-09 DIAGNOSIS — Z12.31 SCREENING MAMMOGRAM, ENCOUNTER FOR: Primary | ICD-10-CM

## 2023-10-09 DIAGNOSIS — R06.09 DOE (DYSPNEA ON EXERTION): ICD-10-CM

## 2023-10-09 DIAGNOSIS — E11.69 HYPERLIPIDEMIA ASSOCIATED WITH TYPE 2 DIABETES MELLITUS: ICD-10-CM

## 2023-10-09 DIAGNOSIS — R07.89 OTHER CHEST PAIN: ICD-10-CM

## 2023-10-09 DIAGNOSIS — I51.89 LEFT VENTRICULAR DIASTOLIC DYSFUNCTION WITH PRESERVED SYSTOLIC FUNCTION: Primary | ICD-10-CM

## 2023-10-09 DIAGNOSIS — Z87.891 EX-SMOKER: ICD-10-CM

## 2023-10-09 DIAGNOSIS — E78.5 HYPERLIPIDEMIA ASSOCIATED WITH TYPE 2 DIABETES MELLITUS: ICD-10-CM

## 2023-10-09 DIAGNOSIS — E11.59 HYPERTENSION ASSOCIATED WITH DIABETES: ICD-10-CM

## 2023-10-09 DIAGNOSIS — I70.0 AORTIC ATHEROSCLEROSIS: ICD-10-CM

## 2023-10-09 DIAGNOSIS — I51.89 LEFT VENTRICULAR DIASTOLIC DYSFUNCTION WITH PRESERVED SYSTOLIC FUNCTION: ICD-10-CM

## 2023-10-09 PROCEDURE — 93005 ELECTROCARDIOGRAM TRACING: CPT | Mod: HCNC,PO

## 2023-10-09 PROCEDURE — 93010 ELECTROCARDIOGRAM REPORT: CPT | Mod: HCNC,,, | Performed by: INTERNAL MEDICINE

## 2023-10-09 PROCEDURE — 1159F PR MEDICATION LIST DOCUMENTED IN MEDICAL RECORD: ICD-10-PCS | Mod: HCNC,CPTII,S$GLB, | Performed by: INTERNAL MEDICINE

## 2023-10-09 PROCEDURE — 3066F PR DOCUMENTATION OF TREATMENT FOR NEPHROPATHY: ICD-10-PCS | Mod: HCNC,CPTII,S$GLB, | Performed by: INTERNAL MEDICINE

## 2023-10-09 PROCEDURE — 3079F PR MOST RECENT DIASTOLIC BLOOD PRESSURE 80-89 MM HG: ICD-10-PCS | Mod: HCNC,CPTII,S$GLB, | Performed by: INTERNAL MEDICINE

## 2023-10-09 PROCEDURE — 93010 EKG 12-LEAD: ICD-10-PCS | Mod: HCNC,,, | Performed by: INTERNAL MEDICINE

## 2023-10-09 PROCEDURE — 1160F PR REVIEW ALL MEDS BY PRESCRIBER/CLIN PHARMACIST DOCUMENTED: ICD-10-PCS | Mod: HCNC,CPTII,S$GLB, | Performed by: INTERNAL MEDICINE

## 2023-10-09 PROCEDURE — 3008F BODY MASS INDEX DOCD: CPT | Mod: HCNC,CPTII,S$GLB, | Performed by: INTERNAL MEDICINE

## 2023-10-09 PROCEDURE — 3075F SYST BP GE 130 - 139MM HG: CPT | Mod: HCNC,CPTII,S$GLB, | Performed by: INTERNAL MEDICINE

## 2023-10-09 PROCEDURE — 3044F PR MOST RECENT HEMOGLOBIN A1C LEVEL <7.0%: ICD-10-PCS | Mod: HCNC,CPTII,S$GLB, | Performed by: INTERNAL MEDICINE

## 2023-10-09 PROCEDURE — 99214 PR OFFICE/OUTPT VISIT, EST, LEVL IV, 30-39 MIN: ICD-10-PCS | Mod: HCNC,S$GLB,, | Performed by: INTERNAL MEDICINE

## 2023-10-09 PROCEDURE — 3061F NEG MICROALBUMINURIA REV: CPT | Mod: HCNC,CPTII,S$GLB, | Performed by: INTERNAL MEDICINE

## 2023-10-09 PROCEDURE — 99999 PR PBB SHADOW E&M-EST. PATIENT-LVL IV: ICD-10-PCS | Mod: PBBFAC,HCNC,, | Performed by: INTERNAL MEDICINE

## 2023-10-09 PROCEDURE — 3075F PR MOST RECENT SYSTOLIC BLOOD PRESS GE 130-139MM HG: ICD-10-PCS | Mod: HCNC,CPTII,S$GLB, | Performed by: INTERNAL MEDICINE

## 2023-10-09 PROCEDURE — 99214 OFFICE O/P EST MOD 30 MIN: CPT | Mod: HCNC,S$GLB,, | Performed by: INTERNAL MEDICINE

## 2023-10-09 PROCEDURE — 3008F PR BODY MASS INDEX (BMI) DOCUMENTED: ICD-10-PCS | Mod: HCNC,CPTII,S$GLB, | Performed by: INTERNAL MEDICINE

## 2023-10-09 PROCEDURE — 99999 PR PBB SHADOW E&M-EST. PATIENT-LVL IV: CPT | Mod: PBBFAC,HCNC,, | Performed by: INTERNAL MEDICINE

## 2023-10-09 PROCEDURE — 3066F NEPHROPATHY DOC TX: CPT | Mod: HCNC,CPTII,S$GLB, | Performed by: INTERNAL MEDICINE

## 2023-10-09 PROCEDURE — 1159F MED LIST DOCD IN RCRD: CPT | Mod: HCNC,CPTII,S$GLB, | Performed by: INTERNAL MEDICINE

## 2023-10-09 PROCEDURE — 3079F DIAST BP 80-89 MM HG: CPT | Mod: HCNC,CPTII,S$GLB, | Performed by: INTERNAL MEDICINE

## 2023-10-09 PROCEDURE — 3044F HG A1C LEVEL LT 7.0%: CPT | Mod: HCNC,CPTII,S$GLB, | Performed by: INTERNAL MEDICINE

## 2023-10-09 PROCEDURE — 3061F PR NEG MICROALBUMINURIA RESULT DOCUMENTED/REVIEW: ICD-10-PCS | Mod: HCNC,CPTII,S$GLB, | Performed by: INTERNAL MEDICINE

## 2023-10-09 PROCEDURE — 1160F RVW MEDS BY RX/DR IN RCRD: CPT | Mod: HCNC,CPTII,S$GLB, | Performed by: INTERNAL MEDICINE

## 2023-10-09 NOTE — TELEPHONE ENCOUNTER
Pt is requesting an order to be placed for a Mammogram. I will place it and let her know that it's done

## 2023-10-09 NOTE — TELEPHONE ENCOUNTER
----- Message from Melita Alejandre sent at 10/9/2023  7:59 AM CDT -----  Patient is requesting an order be put in for her mammogram in order for her to schedule. tc

## 2023-10-09 NOTE — PROGRESS NOTES
Subjective:   Patient ID:  Melita Romero is a 62 y.o. female who presents for cardiac consult of Annual Exam    Referring Physician: Teofilo Morton MD   [address] [facility] [place] [place] [address]    Reason for consult: CHF, HTN      HPI  The patient came in today for cardiac consult of Annual Exam      Melita Romero is a 62 y.o. female pt with HTN, diastolic dysfunction, aortic atherosc, HLD, obesity, NALLELY, GERD, tobacco abuse presents for follow up CV eval.     12/13/21  She had seen Dr. Joyner in the past. BP mildly elevated. Has been stable overall lately. Has occ CP/TAYLOR unsure if due to GERD.     10/9/23  Follow up since 12/2021. Recent lipids improved Tc - 159, LDL 80,  - 9/2023.   BP elevated 130s/90s. HR 80s. BMI 31 - 176 lbs.     She is still smoking wants to quit soon. Still has mild TAYLOR.   ECG - NSR , poor RWP - old     Patient feels no leg swelling, no PND, no palpitation, no dizziness, no syncope, no CNS symptoms.    Patient has fairly good exercise tolerance.    Patient is compliant with medications.    ECG - NSR, poor RWP      CONCLUSIONS     1 - Concentric hypertrophy.     2 - Normal left ventricular systolic function (EF 60-65%).     3 - Left ventricular diastolic dysfunction.     4 - Normal right ventricular systolic function .     This document has been electronically    SIGNED BY: Brian Joyner MD On: 04/16/2014 08:02    Past Medical History:   Diagnosis Date    Abnormal glucose     Anxiety     Arthritis     Back pain     Controlled type 2 diabetes mellitus without complication, without long-term current use of insulin 2/27/2017    DDD (degenerative disc disease), lumbar     Depression     Diverticulosis of colon 1/9/2020    Fibromyalgia     Flank pain 9/13/2021    History of colon polyps 11/6/2019    Hyperlipidemia     Hypertension     Thyroid disease     Tobacco dependence     Trouble in sleeping     Urinary incontinence     wears pads daily       Past Surgical History:   Procedure  Laterality Date    COLONOSCOPY N/A 01/09/2020    Procedure: COLONOSCOPY;  Surgeon: Jacob Lauren MD;  Location: [facility];  Service: Endoscopy;  Laterality: N/A;    HYSTERECTOMY  2003    complete    OOPHORECTOMY Bilateral 2003    SHOULDER ARTHROSCOPY Right 05/16/2017    Dr. Nima Coyne    TONSILLECTOMY         Social History     Tobacco Use    Smoking status: Every Day     Current packs/day: 0.00     Types: Cigarettes     Last attempt to quit: 7/20/2020     Years since quitting: 3.2    Smokeless tobacco: Never    Tobacco comments:     I quit a year, started smoking again.   Substance Use Topics    Alcohol use: Yes     Alcohol/week: 0.0 standard drinks of alcohol     Comment: rarely glass of wine    Drug use: No       Family History   Problem Relation Age of Onset    Glaucoma Mother     Diabetes Mother     Hypertension Brother     Diabetes Brother     Heart disease Maternal Uncle        Patient's Medications   New Prescriptions    No medications on file   Previous Medications    ASPIRIN 81 MG TAB    Take 1 tablet by mouth Daily.    ATORVASTATIN (LIPITOR) 40 MG TABLET    Take 1 tablet (40 mg total) by mouth once daily.    CHOLECALCIFEROL, VITAMIN D3, 2,000 UNIT TAB    Take 1 tablet by mouth Daily.    CLONAZEPAM (KLONOPIN) 0.5 MG TABLET    Take 1 tablet (0.5 mg total) by mouth daily as needed for Anxiety.    DESVENLAFAXINE SUCCINATE (PRISTIQ) 100 MG TB24    Take 1 tablet (100 mg total) by mouth once daily.    HYDROCODONE-ACETAMINOPHEN 10-325MG (NORCO)  MG TAB    Take 1 tablet by mouth every 8 (eight) hours as needed.    LOSARTAN-HYDROCHLOROTHIAZIDE 100-12.5 MG (HYZAAR) 100-12.5 MG TAB    Take 1 tablet by mouth once daily.    METFORMIN (GLUCOPHAGE-XR) 500 MG ER 24HR TABLET    Take 1 tablet (500 mg total) by mouth 2 (two) times daily with meals.    MULTIVITAMIN-CA-IRON-MINERALS 18-0.4 MG TAB    Take 1 tablet by mouth Daily.    OXYBUTYNIN (DITROPAN-XL) 10 MG 24 HR TABLET    Take 1 tablet (10 mg total) by mouth  "once daily.    PANTOPRAZOLE (PROTONIX) 40 MG TABLET    Take 1 tablet (40 mg total) by mouth once daily.    SERTRALINE (ZOLOFT) 100 MG TABLET    Take 2 tablets (200 mg total) by mouth once daily.    TIZANIDINE (ZANAFLEX) 4 MG TABLET    Take 1 tablet (4 mg total) by mouth every 8 (eight) hours.    WOMEN'S DAILY FORMULA 18 MG IRON-400 MCG-500 MG CA TAB       Modified Medications    No medications on file   Discontinued Medications    No medications on file       Review of Systems   Constitutional:  Positive for malaise/fatigue.   HENT: Negative.     Eyes: Negative.    Respiratory:  Positive for shortness of breath.    Cardiovascular:  Positive for chest pain.   Gastrointestinal: Negative.    Genitourinary: Negative.    Musculoskeletal: Negative.    Skin: Negative.    Neurological: Negative.    Endo/Heme/Allergies: Negative.    Psychiatric/Behavioral: Negative.     All 12 systems otherwise negative.      Wt Readings from Last 3 Encounters:   10/09/23 79.9 kg (176 lb 2.4 oz)   09/18/23 81.2 kg (179 lb 0.2 oz)   04/18/23 81.9 kg (180 lb 8.9 oz)     Temp Readings from Last 3 Encounters:   09/18/23 98.1 °F (36.7 °C)   03/07/23 98.1 °F (36.7 °C)   09/19/22 98.4 °F (36.9 °C)     BP Readings from Last 3 Encounters:   10/09/23 135/85   09/18/23 112/80   04/18/23 116/75     Pulse Readings from Last 3 Encounters:   10/09/23 82   09/18/23 68   04/18/23 79       /85 (BP Location: Left arm, Patient Position: Sitting, BP Method: Medium (Manual))   Pulse 82   Ht 5' 3" (1.6 m)   Wt 79.9 kg (176 lb 2.4 oz)   SpO2 97%   BMI 31.20 kg/m²     Objective:   Physical Exam  Vitals and nursing note reviewed.   Constitutional:       General: She is not in acute distress.     Appearance: She is well-developed. She is not diaphoretic.   HENT:      Head: Normocephalic and atraumatic.      Nose: Nose normal.   Eyes:      General: No scleral icterus.     Conjunctiva/sclera: Conjunctivae normal.   Neck:      Thyroid: No thyromegaly.      " Vascular: No JVD.   Cardiovascular:      Rate and Rhythm: Normal rate and regular rhythm.      Heart sounds: S1 normal and S2 normal. No murmur heard.     No friction rub. No gallop. No S3 or S4 sounds.   Pulmonary:      Effort: Pulmonary effort is normal. No respiratory distress.      Breath sounds: Normal breath sounds. No stridor. No wheezing or rales.   Chest:      Chest wall: No tenderness.   Abdominal:      General: Bowel sounds are normal. There is no distension.      Palpations: Abdomen is soft. There is no mass.      Tenderness: There is no abdominal tenderness. There is no rebound.   Genitourinary:     Comments: Deferred  Musculoskeletal:         General: No tenderness or deformity. Normal range of motion.      Cervical back: Normal range of motion and neck supple.   Lymphadenopathy:      Cervical: No cervical adenopathy.   Skin:     General: Skin is warm and dry.      Coloration: Skin is not pale.      Findings: No erythema or rash.   Neurological:      Mental Status: She is alert and oriented to person, place, and time.      Motor: No abnormal muscle tone.      Coordination: Coordination normal.   Psychiatric:         Behavior: Behavior normal.         Thought Content: Thought content normal.         Judgment: Judgment normal.         Lab Results   Component Value Date     09/18/2023    K 4.6 09/18/2023     09/18/2023    CO2 33 (H) 09/18/2023    BUN 9 09/18/2023    CREATININE 0.8 09/18/2023    GLU 94 09/18/2023    HGBA1C 5.2 09/18/2023    AST 19 09/18/2023    ALT 20 09/18/2023    ALBUMIN 4.4 09/18/2023    ALBUMIN 4.5 05/01/2022    PROT 7.4 09/18/2023    BILITOT 0.3 09/18/2023    WBC 6.94 09/18/2023    HGB 15.0 09/18/2023    HCT 45.5 09/18/2023    MCV 97 09/18/2023     09/18/2023    TSH 1.508 09/18/2023    CHOL 159 09/18/2023    HDL 48 09/18/2023    LDLCALC 80.6 09/18/2023    TRIG 152 (H) 09/18/2023     Assessment:      1. Left ventricular diastolic dysfunction with preserved systolic  function    2. Hypertension associated with diabetes    3. Hyperlipidemia associated with type 2 diabetes mellitus    4. Aortic atherosclerosis    5. Ex-smoker    6. TAYLOR (dyspnea on exertion)    7. Other chest pain          Plan:   1. HTN with diastolic dysfunction  - elevated at times  - titrate meds    2. PreDM/DM2 - A1c 5.7 --> 5.2  - cont tx - metformin    3. HLD with aortic atheros  - titrate meds  - Recent lipids improved Tc - 159, LDL 80,  - 9/2023.     4. Tobacco abuse  - is trying to quit     5. CP/TAYLOR  - mild, min lately   - r/o non cardiac etiologies   - will f/u pulm    6. GERD  - cont PPI    7. Obesity -  BMI 31 - 176 lbs.   - cont weight loss     Thank you for allowing me to participate in this patient's care. Please do not hesitate to contact me with any questions or concerns. Consult note has been forwarded to the referral physician.

## 2023-10-10 ENCOUNTER — OFFICE VISIT (OUTPATIENT)
Dept: PULMONOLOGY | Facility: CLINIC | Age: 63
End: 2023-10-10
Payer: MEDICARE

## 2023-10-10 VITALS
WEIGHT: 178.13 LBS | DIASTOLIC BLOOD PRESSURE: 90 MMHG | OXYGEN SATURATION: 94 % | HEIGHT: 63 IN | BODY MASS INDEX: 31.56 KG/M2 | SYSTOLIC BLOOD PRESSURE: 140 MMHG | RESPIRATION RATE: 17 BRPM | HEART RATE: 76 BPM

## 2023-10-10 DIAGNOSIS — I15.2 HYPERTENSION ASSOCIATED WITH DIABETES: ICD-10-CM

## 2023-10-10 DIAGNOSIS — F17.210 NICOTINE DEPENDENCE, CIGARETTES, UNCOMPLICATED: Primary | ICD-10-CM

## 2023-10-10 DIAGNOSIS — E11.59 HYPERTENSION ASSOCIATED WITH DIABETES: ICD-10-CM

## 2023-10-10 PROCEDURE — 1159F MED LIST DOCD IN RCRD: CPT | Mod: HCNC,CPTII,S$GLB, | Performed by: HOSPITALIST

## 2023-10-10 PROCEDURE — 99203 OFFICE O/P NEW LOW 30 MIN: CPT | Mod: HCNC,S$GLB,, | Performed by: HOSPITALIST

## 2023-10-10 PROCEDURE — 99999 PR PBB SHADOW E&M-EST. PATIENT-LVL IV: CPT | Mod: PBBFAC,HCNC,, | Performed by: HOSPITALIST

## 2023-10-10 PROCEDURE — 1159F PR MEDICATION LIST DOCUMENTED IN MEDICAL RECORD: ICD-10-PCS | Mod: HCNC,CPTII,S$GLB, | Performed by: HOSPITALIST

## 2023-10-10 PROCEDURE — 3044F PR MOST RECENT HEMOGLOBIN A1C LEVEL <7.0%: ICD-10-PCS | Mod: HCNC,CPTII,S$GLB, | Performed by: HOSPITALIST

## 2023-10-10 PROCEDURE — 1160F RVW MEDS BY RX/DR IN RCRD: CPT | Mod: HCNC,CPTII,S$GLB, | Performed by: HOSPITALIST

## 2023-10-10 PROCEDURE — 3061F PR NEG MICROALBUMINURIA RESULT DOCUMENTED/REVIEW: ICD-10-PCS | Mod: HCNC,CPTII,S$GLB, | Performed by: HOSPITALIST

## 2023-10-10 PROCEDURE — 3061F NEG MICROALBUMINURIA REV: CPT | Mod: HCNC,CPTII,S$GLB, | Performed by: HOSPITALIST

## 2023-10-10 PROCEDURE — 3044F HG A1C LEVEL LT 7.0%: CPT | Mod: HCNC,CPTII,S$GLB, | Performed by: HOSPITALIST

## 2023-10-10 PROCEDURE — 99999 PR PBB SHADOW E&M-EST. PATIENT-LVL IV: ICD-10-PCS | Mod: PBBFAC,HCNC,, | Performed by: HOSPITALIST

## 2023-10-10 PROCEDURE — 3008F BODY MASS INDEX DOCD: CPT | Mod: HCNC,CPTII,S$GLB, | Performed by: HOSPITALIST

## 2023-10-10 PROCEDURE — 3080F PR MOST RECENT DIASTOLIC BLOOD PRESSURE >= 90 MM HG: ICD-10-PCS | Mod: HCNC,CPTII,S$GLB, | Performed by: HOSPITALIST

## 2023-10-10 PROCEDURE — 3077F PR MOST RECENT SYSTOLIC BLOOD PRESSURE >= 140 MM HG: ICD-10-PCS | Mod: HCNC,CPTII,S$GLB, | Performed by: HOSPITALIST

## 2023-10-10 PROCEDURE — 99203 PR OFFICE/OUTPT VISIT, NEW, LEVL III, 30-44 MIN: ICD-10-PCS | Mod: HCNC,S$GLB,, | Performed by: HOSPITALIST

## 2023-10-10 PROCEDURE — 3066F NEPHROPATHY DOC TX: CPT | Mod: HCNC,CPTII,S$GLB, | Performed by: HOSPITALIST

## 2023-10-10 PROCEDURE — 1160F PR REVIEW ALL MEDS BY PRESCRIBER/CLIN PHARMACIST DOCUMENTED: ICD-10-PCS | Mod: HCNC,CPTII,S$GLB, | Performed by: HOSPITALIST

## 2023-10-10 PROCEDURE — 3008F PR BODY MASS INDEX (BMI) DOCUMENTED: ICD-10-PCS | Mod: HCNC,CPTII,S$GLB, | Performed by: HOSPITALIST

## 2023-10-10 PROCEDURE — 3066F PR DOCUMENTATION OF TREATMENT FOR NEPHROPATHY: ICD-10-PCS | Mod: HCNC,CPTII,S$GLB, | Performed by: HOSPITALIST

## 2023-10-10 PROCEDURE — 3077F SYST BP >= 140 MM HG: CPT | Mod: HCNC,CPTII,S$GLB, | Performed by: HOSPITALIST

## 2023-10-10 PROCEDURE — 3080F DIAST BP >= 90 MM HG: CPT | Mod: HCNC,CPTII,S$GLB, | Performed by: HOSPITALIST

## 2023-10-10 NOTE — ASSESSMENT & PLAN NOTE
- Pt provided with information to Smoking cessation program, encouraged cessation  - further evaluation with PFT and walk  - LDCT ordered

## 2023-10-10 NOTE — PROGRESS NOTES
"Subjective:      Patient ID: Melita Romero is a 62 y.o. female.    Chief Complaint: Tobacco use    HPI 10/10/23:    62 year old female with history of depression, HLD, GERD, HTN, tobacco use, DM2, who presents to Pulmonary clinic to establish care given her continued tobacco use.  She was seen for the first and last time in clinic 7/2020 by Dr. Loo, at that visit  a PSG was ordered. This was negative for sleep apnea.    Mrs. Romero reports no significant coughing, dyspnea, or wheezing when she is not smoking. She notices worsening of these symptoms though when she smokes more than a half pack per day. She is motivated to quit and is planning to contact the smoking cessation program.     Pertinent Work Up:  LDCT 2020- "There is a subpleural nodular opacity in the right lobe lower lobe medially which appears solid and measures 0.4 cm on series 4, image 291.  No nodular opacities identified in the left lung.  The lungs show no findings consistent with emphysema.  Mild scattered parenchymal scarring appears unchanged from prior, remaining most severe in the right middle lobe."    Pulmonary Interventions:   No inhalers    Smoking hx: half pack per day, has quit intermittently in the past, around 20 pack years  Environmental hx: None known, worked in a grocery store    Review of Systems   Respiratory:  Negative for cough, sputum production and wheezing.      Objective:     Physical Exam   Constitutional: She is oriented to person, place, and time. She appears well-developed and well-nourished. She is obese.   Cardiovascular: Normal rate and regular rhythm.   Pulmonary/Chest: Normal expansion, effort normal and breath sounds normal.   Musculoskeletal:         General: No edema.   Neurological: She is alert and oriented to person, place, and time.   Skin: Skin is warm and dry.   Psychiatric: She has a normal mood and affect.     Personal Diagnostic Review  As Above      10/10/2023    12:52 PM 10/9/2023     8:11 " "AM 9/18/2023    11:49 AM 4/18/2023     1:56 PM 3/7/2023     5:10 PM 9/19/2022    10:57 AM 8/17/2022    12:37 PM   Pulmonary Function Tests   SpO2 94 % 97 %   100 %  95 %   Height 5' 3" (1.6 m) 5' 3" (1.6 m)  5' 3" (1.6 m)  5' 3" (1.6 m) 5' 3" (1.6 m)   Weight 80.8 kg (178 lb 2.1 oz) 79.9 kg (176 lb 2.4 oz) 81.2 kg (179 lb 0.2 oz) 81.9 kg (180 lb 8.9 oz) 85.3 kg (188 lb 0.8 oz) 84.1 kg (185 lb 6.5 oz) 86.2 kg (190 lb)   BMI (Calculated) 31.6 31.2  32  32.9 33.7        Assessment:     No diagnosis found.     Outpatient Encounter Medications as of 10/10/2023   Medication Sig Dispense Refill    aspirin 81 mg Tab Take 1 tablet by mouth Daily.      atorvastatin (LIPITOR) 40 MG tablet Take 1 tablet (40 mg total) by mouth once daily. 90 tablet 1    cholecalciferol, vitamin D3, 2,000 unit Tab Take 1 tablet by mouth Daily.      clonazePAM (KLONOPIN) 0.5 MG tablet Take 1 tablet (0.5 mg total) by mouth daily as needed for Anxiety. 30 tablet 0    desvenlafaxine succinate (PRISTIQ) 100 MG Tb24 Take 1 tablet (100 mg total) by mouth once daily. 30 tablet 3    hydrocodone-acetaminophen 10-325mg (NORCO)  mg Tab Take 1 tablet by mouth every 8 (eight) hours as needed. 75 tablet 0    losartan-hydrochlorothiazide 100-12.5 mg (HYZAAR) 100-12.5 mg Tab Take 1 tablet by mouth once daily. 90 tablet 1    metFORMIN (GLUCOPHAGE-XR) 500 MG ER 24hr tablet Take 1 tablet (500 mg total) by mouth 2 (two) times daily with meals. 180 tablet 1    multivitamin-Ca-iron-minerals 18-0.4 mg Tab Take 1 tablet by mouth Daily.      oxybutynin (DITROPAN-XL) 10 MG 24 hr tablet Take 1 tablet (10 mg total) by mouth once daily. 90 tablet 4    pantoprazole (PROTONIX) 40 MG tablet Take 1 tablet (40 mg total) by mouth once daily. 90 tablet 1    sertraline (ZOLOFT) 100 MG tablet Take 2 tablets (200 mg total) by mouth once daily. 60 tablet 0    tiZANidine (ZANAFLEX) 4 MG tablet Take 1 tablet (4 mg total) by mouth every 8 (eight) hours. 90 tablet 0    WOMEN'S DAILY " FORMULA 18 mg iron-400 mcg-500 mg Ca Tab        No facility-administered encounter medications on file as of 10/10/2023.     No orders of the defined types were placed in this encounter.        Plan:     Problem List Items Addressed This Visit          Cardiac/Vascular    Hypertension associated with diabetes     - stable on losartan-hctz  - 140/90 in clinic, pt states usually runs a little higher than that            Other    Nicotine dependence, cigarettes, uncomplicated - Primary     - Pt provided with information to Smoking cessation program, encouraged cessation  - further evaluation with PFT and walk  - LDCT ordered         Relevant Orders    CT Chest Lung Screening Low Dose    Complete PFT w/ bronchodilator    Stress test, pulmonary     Plan discussed with pt and she expressed understanding, all questions asnwered. RTC 4 weeks with testing.

## 2023-10-12 ENCOUNTER — OFFICE VISIT (OUTPATIENT)
Dept: PSYCHIATRY | Facility: CLINIC | Age: 63
End: 2023-10-12
Payer: MEDICARE

## 2023-10-12 DIAGNOSIS — F41.1 GENERALIZED ANXIETY DISORDER: Primary | ICD-10-CM

## 2023-10-12 DIAGNOSIS — F32.A CHRONIC DEPRESSION: ICD-10-CM

## 2023-10-12 PROCEDURE — 3066F PR DOCUMENTATION OF TREATMENT FOR NEPHROPATHY: ICD-10-PCS | Mod: HCNC,CPTII,95, | Performed by: SOCIAL WORKER

## 2023-10-12 PROCEDURE — 1159F PR MEDICATION LIST DOCUMENTED IN MEDICAL RECORD: ICD-10-PCS | Mod: HCNC,CPTII,95, | Performed by: SOCIAL WORKER

## 2023-10-12 PROCEDURE — 3044F HG A1C LEVEL LT 7.0%: CPT | Mod: HCNC,CPTII,95, | Performed by: SOCIAL WORKER

## 2023-10-12 PROCEDURE — 1159F MED LIST DOCD IN RCRD: CPT | Mod: HCNC,CPTII,95, | Performed by: SOCIAL WORKER

## 2023-10-12 PROCEDURE — 90832 PR PSYCHOTHERAPY W/PATIENT, 30 MIN: ICD-10-PCS | Mod: HCNC,95,, | Performed by: SOCIAL WORKER

## 2023-10-12 PROCEDURE — 3044F PR MOST RECENT HEMOGLOBIN A1C LEVEL <7.0%: ICD-10-PCS | Mod: HCNC,CPTII,95, | Performed by: SOCIAL WORKER

## 2023-10-12 PROCEDURE — 90832 PSYTX W PT 30 MINUTES: CPT | Mod: HCNC,95,, | Performed by: SOCIAL WORKER

## 2023-10-12 PROCEDURE — 3066F NEPHROPATHY DOC TX: CPT | Mod: HCNC,CPTII,95, | Performed by: SOCIAL WORKER

## 2023-10-12 PROCEDURE — 3061F NEG MICROALBUMINURIA REV: CPT | Mod: HCNC,CPTII,95, | Performed by: SOCIAL WORKER

## 2023-10-12 PROCEDURE — 3061F PR NEG MICROALBUMINURIA RESULT DOCUMENTED/REVIEW: ICD-10-PCS | Mod: HCNC,CPTII,95, | Performed by: SOCIAL WORKER

## 2023-10-12 NOTE — PROGRESS NOTES
Individual Psychotherapy Follow-up Visit Progress Note (PhD/LCSW)     Outpatient Psychotherapy - 30 minutes with patient (16-37 minutes) - [id number]     Date: 10/12/2023    Visit Type: Virtual Visit with synchronous video/audio    Pt's confirmed location at the time of visit: home/residence in [place].    Due to the nature of this visit type, a virtual visit with synchronous audio and video, each patient to whom this provider administers behavioral health services by telemedicine is: (1) informed of the relationship between the provider and patient and the respective role of any other health care provider with respect to management of the patient; and (2) notified that he or she may decline to receive services by telemedicine and may withdraw from such care at any time.    The patient was informed of the following:     Provider's contact info:  [facility] [address] [place] [address]  (Phone) [phone]    If technology issues occur, call office phone: Ph: [phone]  If crisis: Dial 911 or go to nearest Emergency Room (ER)  If questions related to privacy practices: contact [facility] Department: [phone]    Crisis Disclaimer: Patient was informed that due to the virtual nature of the visit, that if a crisis develops, protocols will be implemented to ensure patient safety, including but not limited to: 1) Initiating a welfare check with local law enforcement and/or 2) Calling 911    10/12/2023  MRN: [id number]  Primary Care Provider: Teofilo Morton MD    Melita Romero is a 62 y.o. female who presents today for follow-up of depression and anxiety. Met with patient.      Preferred Name: Melita     Subjective:     Last encounter (with this provider): 6/1/2023     Content of Current Session: Pt states she is feeling much better, less depressed, less irritable, is sleeping better. She worries about relapse but is trying  to focus on the positive and doing what she can to take care of herself. She is attending her medical appointments, helping out at her cousin's [employer] company, and watching her 4 y/o grandson. She no longer cries every day. Rates her depression 3-4/10, which is down from 6/10 at her last visit. She feels the Pristiq has helped tremendously. She has a goal of getting dentures next year. She says her house is messy, but she is not beating herself up about it. She has been writing down affirmations and daily accomplishments. Reinforced pt's progress and discussed relapse prevention. Worked on identifying and challenging distorted and unhelpful beliefs about herself.    Therapeutic Interventions Utilized During Current Session: Cognitive Behavioral Therapy, Supportive Therapy      Objective:       Mental Status Evaluation  Appearance: unremarkable, age appropriate  Behavior: normal, cooperative  Speech: normal tone, normal rate, normal pitch, normal volume  Mood: anxious  Affect: congruent and appropriate  Thought Process: normal and logical  Thought Content: normal, no suicidality, no homicidality, delusions, or paranoia  Sensorium: grossly intact  Cognition: grossly intact  Insight: good  Judgment: adequate to circumstances    Risk parameters:  Patient reports no suicidal ideation  Patient reports no homicidal ideation  Patient reports no self-injurious behavior  Patient reports no violent behavior      Assessment & Plan:     The patient's response to the interventions is accepting    The patient's progress toward treatment goals is good    Homework assigned: daily journaling and write down 3 goals each morning, daily affirmations    Treatment plan:   A. Target symptoms: Depression, Anxiety, and Poor Coping Skills   B. Therapeutic modalities: insight oriented psychotherapy, behavior modifying psychotherapy, supportive psychotherapy  C. Why chosen therapy is appropriate versus another modality: relevant to  diagnosis   D. Outcome monitoring methods: self report, observation, rating scales, feedback from clinical staff      Visit Diagnosis:   1. Generalized anxiety disorder    2. Chronic depression        Follow-up: individual psychotherapy and medication management by physician    Return to Clinic: as scheduled  Pt Reported to Schedule Self via Epic EMR MyChart Application and/or Department Support Staff    MAYELA VargasW-BACS, CFSW

## 2023-10-17 ENCOUNTER — PATIENT OUTREACH (OUTPATIENT)
Dept: ADMINISTRATIVE | Facility: HOSPITAL | Age: 63
End: 2023-10-17
Payer: MEDICARE

## 2023-10-30 RX ORDER — CLONAZEPAM 0.5 MG/1
0.5 TABLET ORAL DAILY PRN
Qty: 30 TABLET | Refills: 0 | Status: SHIPPED | OUTPATIENT
Start: 2023-10-30 | End: 2023-12-26

## 2023-11-03 ENCOUNTER — OFFICE VISIT (OUTPATIENT)
Dept: OPHTHALMOLOGY | Facility: CLINIC | Age: 63
End: 2023-11-03
Payer: COMMERCIAL

## 2023-11-03 DIAGNOSIS — H52.4 MYOPIA WITH ASTIGMATISM AND PRESBYOPIA, BILATERAL: ICD-10-CM

## 2023-11-03 DIAGNOSIS — H52.203 MYOPIA WITH ASTIGMATISM AND PRESBYOPIA, BILATERAL: ICD-10-CM

## 2023-11-03 DIAGNOSIS — E11.9 DIABETES MELLITUS WITHOUT COMPLICATION: Primary | ICD-10-CM

## 2023-11-03 DIAGNOSIS — H52.13 MYOPIA WITH ASTIGMATISM AND PRESBYOPIA, BILATERAL: ICD-10-CM

## 2023-11-03 DIAGNOSIS — E11.36 DIABETIC CATARACT: ICD-10-CM

## 2023-11-03 PROCEDURE — 92014 PR EYE EXAM, EST PATIENT,COMPREHESV: ICD-10-PCS | Mod: HCNC,S$GLB,, | Performed by: OPTOMETRIST

## 2023-11-03 PROCEDURE — 92015 DETERMINE REFRACTIVE STATE: CPT | Mod: HCNC,S$GLB,, | Performed by: OPTOMETRIST

## 2023-11-03 PROCEDURE — 99999 PR PBB SHADOW E&M-EST. PATIENT-LVL III: CPT | Mod: PBBFAC,HCNC,, | Performed by: OPTOMETRIST

## 2023-11-03 PROCEDURE — 92014 COMPRE OPH EXAM EST PT 1/>: CPT | Mod: HCNC,S$GLB,, | Performed by: OPTOMETRIST

## 2023-11-03 PROCEDURE — 99999 PR PBB SHADOW E&M-EST. PATIENT-LVL III: ICD-10-PCS | Mod: PBBFAC,HCNC,, | Performed by: OPTOMETRIST

## 2023-11-03 PROCEDURE — 92015 PR REFRACTION: ICD-10-PCS | Mod: HCNC,S$GLB,, | Performed by: OPTOMETRIST

## 2023-11-03 NOTE — PROGRESS NOTES
HPI     Annual Exam            Comments: Pt reports for annual diab exam. Denies any pain or irritation.   Va stable. No drops. Last A1C 5.3 (8mo ago). Using AT prn. OS slightly red   and watery / slight irritation. Pt states she's been having allergies.           Comments    1. DM  2. NS OU            Last edited by Abdelrahman Jean-Baptiste on 11/3/2023  9:48 AM.            Assessment /Plan     For exam results, see Encounter Report.    Diabetes mellitus without complication  15 years, last A1c 5.2 There was no diabetic retinopathy present on either eye on examination today. Recommend good blood pressure control, strict blood glucose control, and good cholesterol control.  Continue close care with Dr. Morton regarding diabetes.    Diabetic cataract  Cataracts are not visually significant and not affecting activities of daily living. Annual observation is recommended at this time. Patient to call or return to clinic with any significant change in vision prior to next visit.    Myopia with astigmatism and presbyopia, bilateral  Eyeglass Final Rx       Eyeglass Final Rx         Sphere Cylinder Axis Add    Right -0.75 +1.25 150 +2.50    Left -1.25 +0.50 044 +2.50      Type: PAL    Expiration Date: 11/3/2024                      RTC 1 yr for dilated eye exam or sooner if any changes to vision.   Discussed above and answered questions.

## 2023-11-14 ENCOUNTER — CLINICAL SUPPORT (OUTPATIENT)
Dept: PULMONOLOGY | Facility: CLINIC | Age: 63
End: 2023-11-14
Attending: HOSPITALIST
Payer: MEDICARE

## 2023-11-14 ENCOUNTER — HOSPITAL ENCOUNTER (OUTPATIENT)
Dept: RADIOLOGY | Facility: HOSPITAL | Age: 63
Discharge: HOME | End: 2023-11-14
Attending: HOSPITALIST
Payer: MEDICARE

## 2023-11-14 VITALS
HEIGHT: 63 IN | BODY MASS INDEX: 31.13 KG/M2 | BODY MASS INDEX: 31.13 KG/M2 | RESPIRATION RATE: 13 BRPM | OXYGEN SATURATION: 98 % | HEART RATE: 76 BPM | SYSTOLIC BLOOD PRESSURE: 125 MMHG | DIASTOLIC BLOOD PRESSURE: 75 MMHG | WEIGHT: 175.69 LBS | WEIGHT: 175.69 LBS | HEIGHT: 63 IN

## 2023-11-14 DIAGNOSIS — F17.210 NICOTINE DEPENDENCE, CIGARETTES, UNCOMPLICATED: ICD-10-CM

## 2023-11-14 DIAGNOSIS — J45.20 MILD INTERMITTENT ASTHMA WITHOUT COMPLICATION: Primary | ICD-10-CM

## 2023-11-14 LAB
BRPFT: ABNORMAL
DLCO ADJ PRE: 17.83 ML/(MIN*MMHG) (ref 16.39–27.85)
DLCO SINGLE BREATH LLN: 16.39
DLCO SINGLE BREATH PRE REF: 80.6 %
DLCO SINGLE BREATH REF: 22.12
DLCOC SBVA LLN: 3.1
DLCOC SBVA PRE REF: 105.9 %
DLCOC SBVA REF: 4.64
DLCOC SINGLE BREATH LLN: 16.39
DLCOC SINGLE BREATH PRE REF: 80.6 %
DLCOC SINGLE BREATH REF: 22.12
DLCOVA LLN: 3.1
DLCOVA PRE REF: 105.9 %
DLCOVA PRE: 4.91 ML/(MIN*MMHG*L) (ref 3.1–6.18)
DLCOVA REF: 4.64
DLVAADJ PRE: 4.91 ML/(MIN*MMHG*L) (ref 3.1–6.18)
ERV LLN: -16449.21
ERV PRE REF: 23.5 %
ERV REF: 0.79
FEF 25 75 CHG: 68.4 %
FEF 25 75 LLN: 1.11
FEF 25 75 POST REF: 94.8 %
FEF 25 75 PRE REF: 56.3 %
FEF 25 75 REF: 2.18
FET100 CHG: -12.2 %
FEV1 CHG: 15.7 %
FEV1 FVC CHG: 10.4 %
FEV1 FVC LLN: 67
FEV1 FVC POST REF: 103.5 %
FEV1 FVC PRE REF: 93.7 %
FEV1 FVC REF: 79
FEV1 LLN: 1.8
FEV1 POST REF: 79.2 %
FEV1 PRE REF: 68.4 %
FEV1 REF: 2.39
FRCPLETH LLN: 1.82
FRCPLETH PREREF: 105.6 %
FRCPLETH REF: 2.64
FVC CHG: 4.8 %
FVC LLN: 2.29
FVC POST REF: 76.1 %
FVC PRE REF: 72.6 %
FVC REF: 3.03
IVC PRE: 2.08 L (ref 2.29–3.77)
IVC SINGLE BREATH LLN: 2.29
IVC SINGLE BREATH PRE REF: 68.8 %
IVC SINGLE BREATH REF: 3.03
MVV LLN: 75
MVV PRE REF: 73.2 %
MVV REF: 89
PEF CHG: 12.8 %
PEF LLN: 4.48
PEF POST REF: 77.3 %
PEF PRE REF: 68.5 %
PEF REF: 6.14
POST FEF 25 75: 2.07 L/S (ref 1.11–3.26)
POST FET 100: 7.31 SEC
POST FEV1 FVC: 82.15 % (ref 67.23–91.51)
POST FEV1: 1.89 L (ref 1.8–2.98)
POST FVC: 2.3 L (ref 2.29–3.77)
POST PEF: 4.74 L/S (ref 4.48–7.8)
PRE DLCO: 17.83 ML/(MIN*MMHG) (ref 16.39–27.85)
PRE ERV: 0.18 L (ref -16449.21–16450.79)
PRE FEF 25 75: 1.23 L/S (ref 1.11–3.26)
PRE FET 100: 8.33 SEC
PRE FEV1 FVC: 74.39 % (ref 67.23–91.51)
PRE FEV1: 1.64 L (ref 1.8–2.98)
PRE FRC PL: 2.79 L
PRE FVC: 2.2 L (ref 2.29–3.77)
PRE MVV: 65 L/MIN (ref 75.48–102.12)
PRE PEF: 4.21 L/S (ref 4.48–7.8)
PRE RV: 2.37 L (ref 1.28–2.43)
PRE TLC: 4.74 L (ref 3.78–5.76)
RAW LLN: 3.06
RAW PRE REF: 153.4 %
RAW PRE: 4.69 CMH2O*S/L (ref 3.06–3.06)
RAW REF: 3.06
RV LLN: 1.28
RV PRE REF: 127.5 %
RV REF: 1.86
RVTLC LLN: 30
RVTLC PRE REF: 126.8 %
RVTLC PRE: 49.93 % (ref 29.77–48.95)
RVTLC REF: 39
TLC LLN: 3.78
TLC PRE REF: 99.4 %
TLC REF: 4.77
VA PRE: 3.63 L (ref 4.62–4.62)
VA SINGLE BREATH LLN: 4.62
VA SINGLE BREATH PRE REF: 78.6 %
VA SINGLE BREATH REF: 4.62
VC LLN: 2.29
VC PRE REF: 78.4 %
VC PRE: 2.37 L (ref 2.29–3.77)
VC REF: 3.03
VTGRAWPRE: 2.77 L

## 2023-11-14 PROCEDURE — 3074F PR MOST RECENT SYSTOLIC BLOOD PRESSURE < 130 MM HG: ICD-10-PCS | Mod: HCNC,CPTII,S$GLB, | Performed by: HOSPITALIST

## 2023-11-14 PROCEDURE — 99999 PR PBB SHADOW E&M-EST. PATIENT-LVL V: ICD-10-PCS | Mod: PBBFAC,HCNC,, | Performed by: HOSPITALIST

## 2023-11-14 PROCEDURE — 3078F PR MOST RECENT DIASTOLIC BLOOD PRESSURE < 80 MM HG: ICD-10-PCS | Mod: HCNC,CPTII,S$GLB, | Performed by: HOSPITALIST

## 2023-11-14 PROCEDURE — 3061F NEG MICROALBUMINURIA REV: CPT | Mod: HCNC,CPTII,S$GLB, | Performed by: HOSPITALIST

## 2023-11-14 PROCEDURE — 3074F SYST BP LT 130 MM HG: CPT | Mod: HCNC,CPTII,S$GLB, | Performed by: HOSPITALIST

## 2023-11-14 PROCEDURE — 94726 PLETHYSMOGRAPHY LUNG VOLUMES: CPT | Mod: HCNC,S$GLB,, | Performed by: INTERNAL MEDICINE

## 2023-11-14 PROCEDURE — 99999 PR PBB SHADOW E&M-EST. PATIENT-LVL I: CPT | Mod: PBBFAC,HCNC,,

## 2023-11-14 PROCEDURE — 99213 OFFICE O/P EST LOW 20 MIN: CPT | Mod: HCNC,S$GLB,, | Performed by: HOSPITALIST

## 2023-11-14 PROCEDURE — 94060 EVALUATION OF WHEEZING: CPT | Mod: 59,HCNC,S$GLB, | Performed by: INTERNAL MEDICINE

## 2023-11-14 PROCEDURE — 94729 DIFFUSING CAPACITY: CPT | Mod: HCNC,S$GLB,, | Performed by: INTERNAL MEDICINE

## 2023-11-14 PROCEDURE — 1160F RVW MEDS BY RX/DR IN RCRD: CPT | Mod: HCNC,CPTII,S$GLB, | Performed by: HOSPITALIST

## 2023-11-14 PROCEDURE — 99213 PR OFFICE/OUTPT VISIT, EST, LEVL III, 20-29 MIN: ICD-10-PCS | Mod: HCNC,S$GLB,, | Performed by: HOSPITALIST

## 2023-11-14 PROCEDURE — 1159F PR MEDICATION LIST DOCUMENTED IN MEDICAL RECORD: ICD-10-PCS | Mod: HCNC,CPTII,S$GLB, | Performed by: HOSPITALIST

## 2023-11-14 PROCEDURE — 99999 PR PBB SHADOW E&M-EST. PATIENT-LVL V: CPT | Mod: PBBFAC,HCNC,, | Performed by: HOSPITALIST

## 2023-11-14 PROCEDURE — 99999 PR PBB SHADOW E&M-EST. PATIENT-LVL II: CPT | Mod: PBBFAC,HCNC,,

## 2023-11-14 PROCEDURE — 71271 CT THORAX LUNG CANCER SCR C-: CPT | Mod: TC,HCNC

## 2023-11-14 PROCEDURE — 3066F PR DOCUMENTATION OF TREATMENT FOR NEPHROPATHY: ICD-10-PCS | Mod: HCNC,CPTII,S$GLB, | Performed by: HOSPITALIST

## 2023-11-14 PROCEDURE — 99999 PR PBB SHADOW E&M-EST. PATIENT-LVL II: ICD-10-PCS | Mod: PBBFAC,HCNC,,

## 2023-11-14 PROCEDURE — 94726 PULM FUNCT TST PLETHYSMOGRAP: ICD-10-PCS | Mod: HCNC,S$GLB,, | Performed by: INTERNAL MEDICINE

## 2023-11-14 PROCEDURE — 71271 CT THORAX LUNG CANCER SCR C-: CPT | Mod: 26,HCNC,, | Performed by: RADIOLOGY

## 2023-11-14 PROCEDURE — 99999 PR PBB SHADOW E&M-EST. PATIENT-LVL I: ICD-10-PCS | Mod: PBBFAC,HCNC,,

## 2023-11-14 PROCEDURE — 1159F MED LIST DOCD IN RCRD: CPT | Mod: HCNC,CPTII,S$GLB, | Performed by: HOSPITALIST

## 2023-11-14 PROCEDURE — 3044F PR MOST RECENT HEMOGLOBIN A1C LEVEL <7.0%: ICD-10-PCS | Mod: HCNC,CPTII,S$GLB, | Performed by: HOSPITALIST

## 2023-11-14 PROCEDURE — 3061F PR NEG MICROALBUMINURIA RESULT DOCUMENTED/REVIEW: ICD-10-PCS | Mod: HCNC,CPTII,S$GLB, | Performed by: HOSPITALIST

## 2023-11-14 PROCEDURE — 3044F HG A1C LEVEL LT 7.0%: CPT | Mod: HCNC,CPTII,S$GLB, | Performed by: HOSPITALIST

## 2023-11-14 PROCEDURE — 94618 PULMONARY STRESS TESTING: CPT | Mod: HCNC,S$GLB,, | Performed by: INTERNAL MEDICINE

## 2023-11-14 PROCEDURE — 1160F PR REVIEW ALL MEDS BY PRESCRIBER/CLIN PHARMACIST DOCUMENTED: ICD-10-PCS | Mod: HCNC,CPTII,S$GLB, | Performed by: HOSPITALIST

## 2023-11-14 PROCEDURE — 3066F NEPHROPATHY DOC TX: CPT | Mod: HCNC,CPTII,S$GLB, | Performed by: HOSPITALIST

## 2023-11-14 PROCEDURE — 94618 PULMONARY STRESS TESTING: ICD-10-PCS | Mod: HCNC,S$GLB,, | Performed by: INTERNAL MEDICINE

## 2023-11-14 PROCEDURE — 71271 CT CHEST LUNG SCREENING LOW DOSE: ICD-10-PCS | Mod: 26,HCNC,, | Performed by: RADIOLOGY

## 2023-11-14 PROCEDURE — 3008F BODY MASS INDEX DOCD: CPT | Mod: HCNC,CPTII,S$GLB, | Performed by: HOSPITALIST

## 2023-11-14 PROCEDURE — 94729 PR C02/MEMBANE DIFFUSE CAPACITY: ICD-10-PCS | Mod: HCNC,S$GLB,, | Performed by: INTERNAL MEDICINE

## 2023-11-14 PROCEDURE — 3078F DIAST BP <80 MM HG: CPT | Mod: HCNC,CPTII,S$GLB, | Performed by: HOSPITALIST

## 2023-11-14 PROCEDURE — 3008F PR BODY MASS INDEX (BMI) DOCUMENTED: ICD-10-PCS | Mod: HCNC,CPTII,S$GLB, | Performed by: HOSPITALIST

## 2023-11-14 PROCEDURE — 94060 PR EVAL OF BRONCHOSPASM: ICD-10-PCS | Mod: 59,HCNC,S$GLB, | Performed by: INTERNAL MEDICINE

## 2023-11-14 RX ORDER — FLUTICASONE PROPIONATE AND SALMETEROL 250; 50 UG/1; UG/1
1 POWDER RESPIRATORY (INHALATION) 2 TIMES DAILY
Qty: 28 EACH | Refills: 5 | Status: SHIPPED | OUTPATIENT
Start: 2023-11-14 | End: 2023-11-14 | Stop reason: SDUPTHER

## 2023-11-14 RX ORDER — ALBUTEROL SULFATE 90 UG/1
2 AEROSOL, METERED RESPIRATORY (INHALATION) EVERY 6 HOURS PRN
Qty: 18 G | Refills: 5 | Status: SHIPPED | OUTPATIENT
Start: 2023-11-14 | End: 2024-11-13

## 2023-11-14 RX ORDER — FLUTICASONE PROPIONATE AND SALMETEROL 250; 50 UG/1; UG/1
1 POWDER RESPIRATORY (INHALATION) 2 TIMES DAILY
Qty: 60 EACH | Refills: 5 | Status: SHIPPED | OUTPATIENT
Start: 2023-11-14 | End: 2024-11-13

## 2023-11-14 NOTE — PROCEDURES
[facility]  Six [facility]   SUMMARY   Ordering Provider: Megan Delgado PA-C   Interpreting Provider: Barry Loo MD  Performing nurse/tech/RT: VT, RT  Diagnosis:  (Nicotine dependence, cigarettes, uncomplicated)  Height: 5' 3" (160 cm)  Weight: 79.7 kg (175 lb 11.3 oz)  BMI (Calculated): 31.1   Patient Race:    Phase Oxygen Assessment Supplemental O2 Heart   Rate Blood Pressure Mark Dyspnea Scale Rating   Resting 98 % Room Air 76 bpm 125/75 3   Exercise        Minute        1 90 % Room Air 110 bpm     2 90 % Room Air 119 bpm     3 95 % Room Air 122 bpm     4 96 % Room Air 119 bpm     5 96 % Room Air 123 bpm     6  97 % Room Air 126 bpm 154/74 4   Recovery        Minute        1 97 % Room Air 91 bpm     2 96 % Room Air 87 bpm     3 94 % Room Air 87 bpm     4 97 % Room Air 85 bpm 128/75 3     Six Minute Walk Summary  6MWT Status: completed without stopping  Patient Reported: Dyspnea, Other (Comment)     Interpretation:  Did the patient stop or pause?: No               Total Time Walked (Calculated): 360 seconds  Final Partial Lap Distance (feet): 125 feet  Total Distance Meters (Calculated): 464.82 meters  Predicted Distance Meters (Calculated): 475.29 meters  Percentage of Predicted (Calculated): 97.8  Peak VO2 (Calculated): 17.92  Mets: 5.12  Has The Patient Had a Previous Six Minute Walk Test?: Yes       Previous 6MWT Results  Has The Patient Had a Previous Six Minute Walk Test?: Yes  Date of Previous Test: 04/17/23  Total Time Walked: 360 seconds  Total Distance (meters): 175.20  Predicted Distance (meters): 423.97 meters  Percentage of Predicted: 41.34  Percent Change (Calculated): -1.65           CLINICAL INTERPRETATION:  Six minute walk distance is 464.82m (97.8 % predicted) with light dyspnea.  During exercise, there was no significant desaturation while breathing room air.  Blood pressure remained stable and Heart rate remained stable with walking.  The patient " did not report non-pulmonary symptoms during exercise.  The patient did complete the study, walking 360 seconds of the 360 second test.  Since the previous study in 04/17/2023, exercise capacity is significantly improved.  Based upon age and body mass index, exercise capacity is normal.      [] Mild exercise-induced hypoxemia described as an arterial oxygen saturation of 93-95% (or 3-4% less than at rest)  [x]  Moderate exercise-induced hypoxemia as 89-93%  []  Severe exercise induced hypoxemia as < 89% O2 saturation.  Medicare Criteria for oxygen prescription comments:   []  When arterial oxygen saturation is at or below 88% during exercise (severe exercise induced hypoxemia) then the patient falls under Medicare Group 1 criteria for supplemental oxygen

## 2023-11-14 NOTE — PROGRESS NOTES
"  Subjective:      Patient ID: Melita Romero is a 62 y.o. female.    Chief Complaint: Tobacco Use    Interval Hx 11/14/23:    Mrs. Romero presents for follow up tobacco use. She was seen for the first and most recent time 10/10/23- reported mild resp symptoms when she increased her cigarette volume. Further work up was ordered in the way of PFT, walk, and updated LDCT.     Mrs. Romero reports that symptoms have been about the same. She has not yet decided to stop smoking, but it is heavy on her mind. She reports that she could tell an improvement in her breathing after the albuterol treatment.    Interim Work Up:  LDCT- Lung-rads 1, no emphysema changes, RML atelectasis  PFT- Normal spirometry with significant bronchodilator response (15.7%, .25L), normal lung volumes, normal diffusion, mild obstruction with flow loop  6MW- Resting SpO2 98%, Range 90-97%, walked 464m, 97% predicted, no pauses    Pulmonary Interventions:   No inhalers    HPI 10/10/23:     62 year old female with history of depression, HLD, GERD, HTN, tobacco use, DM2, who presents to Pulmonary clinic to establish care given her continued tobacco use.  She was seen for the first and last time in clinic 7/2020 by Dr. Loo, at that visit  a PSG was ordered. This was negative for sleep apnea.     Mrs. Romero reports no significant coughing, dyspnea, or wheezing when she is not smoking. She notices worsening of these symptoms though when she smokes more than a half pack per day. She is motivated to quit and is planning to contact the smoking cessation program.      Pertinent Work Up:  LDCT 2020- "There is a subpleural nodular opacity in the right lobe lower lobe medially which appears solid and measures 0.4 cm on series 4, image 291.  No nodular opacities identified in the left lung.  The lungs show no findings consistent with emphysema.  Mild scattered parenchymal scarring appears unchanged from prior, remaining most severe in the right " "middle lobe."     Pulmonary Interventions:   No inhalers     Smoking hx: half pack per day, has quit intermittently in the past, around 20 pack years  Environmental hx: None known, worked in a grocery store    Review of Systems   Respiratory:  Positive for cough and dyspnea on extertion. Negative for sputum production and wheezing.      Objective:     Physical Exam   Constitutional: She is oriented to person, place, and time. She appears well-developed and well-nourished. She is obese.   Cardiovascular: Normal rate and regular rhythm.   Pulmonary/Chest: Normal expansion, effort normal and breath sounds normal.   Musculoskeletal:         General: No edema.   Neurological: She is alert and oriented to person, place, and time.   Skin: Skin is warm and dry.   Psychiatric: She has a normal mood and affect.     Personal Diagnostic Review  As Above      11/14/2023     2:31 PM 10/10/2023    12:52 PM 10/9/2023     8:11 AM 9/18/2023    11:49 AM 4/18/2023     1:56 PM 3/7/2023     5:10 PM 9/19/2022    10:57 AM   Pulmonary Function Tests   SpO2  94 % 97 %   100 %    Ordering Provider Megan Delgado PA-C         Performing nurse/tech/RT VT, RT         Height 5' 3" (1.6 m) 5' 3" (1.6 m) 5' 3" (1.6 m)  5' 3" (1.6 m)  5' 3" (1.6 m)   Weight 79.7 kg (175 lb 11.3 oz) 80.8 kg (178 lb 2.1 oz) 79.9 kg (176 lb 2.4 oz) 81.2 kg (179 lb 0.2 oz) 81.9 kg (180 lb 8.9 oz) 85.3 kg (188 lb 0.8 oz) 84.1 kg (185 lb 6.5 oz)   BMI (Calculated) 31.1 31.6 31.2  32  32.9   Patient Race          6MWT Status completed without stopping         Patient Reported Dyspnea;Other (Comment)         Was O2 used? No         6MW Distance walked (feet) 1525 feet         Distance walked (meters) 464.82 meters         Type of assistive device(s) used? no assistive devices         Oxygen Saturation 98 %         Supplemental Oxygen Room Air         Heart Rate 76 bpm         Blood Pressure 125/75         Mark Dyspnea Rating  moderate         Oxygen Saturation 97 " %         Supplemental Oxygen Room Air         Heart Rate 126 bpm         Blood Pressure 154/74         Mark Dyspnea Rating  somewhat heavy         Recovery Time (seconds) 240 seconds         Oxygen Saturation 97 %         Supplemental Oxygen Room Air         Heart Rate 85 bpm         Blood Pressure 128/75         Mark Dyspnea Rating  moderate         Is procedure ready for interpretation? Yes         Oxygen Qualification? No              Assessment:     No diagnosis found.     Outpatient Encounter Medications as of 11/14/2023   Medication Sig Dispense Refill    aspirin 81 mg Tab Take 1 tablet by mouth Daily.      atorvastatin (LIPITOR) 40 MG tablet Take 1 tablet (40 mg total) by mouth once daily. 90 tablet 1    cholecalciferol, vitamin D3, 2,000 unit Tab Take 1 tablet by mouth Daily.      clonazePAM (KLONOPIN) 0.5 MG tablet TAKE 1 TABLET (0.5 MG TOTAL) BY MOUTH DAILY AS NEEDED FOR ANXIETY. 30 tablet 0    desvenlafaxine succinate (PRISTIQ) 100 MG Tb24 Take 1 tablet (100 mg total) by mouth once daily. 30 tablet 3    hydrocodone-acetaminophen 10-325mg (NORCO)  mg Tab Take 1 tablet by mouth every 8 (eight) hours as needed. 75 tablet 0    losartan-hydrochlorothiazide 100-12.5 mg (HYZAAR) 100-12.5 mg Tab Take 1 tablet by mouth once daily. 90 tablet 1    metFORMIN (GLUCOPHAGE-XR) 500 MG ER 24hr tablet Take 1 tablet (500 mg total) by mouth 2 (two) times daily with meals. 180 tablet 1    multivitamin-Ca-iron-minerals 18-0.4 mg Tab Take 1 tablet by mouth Daily.      oxybutynin (DITROPAN-XL) 10 MG 24 hr tablet Take 1 tablet (10 mg total) by mouth once daily. 90 tablet 4    pantoprazole (PROTONIX) 40 MG tablet Take 1 tablet (40 mg total) by mouth once daily. 90 tablet 1    tiZANidine (ZANAFLEX) 4 MG tablet Take 1 tablet (4 mg total) by mouth every 8 (eight) hours. 90 tablet 0    WOMEN'S DAILY FORMULA 18 mg iron-400 mcg-500 mg Ca Tab       [DISCONTINUED] clonazePAM (KLONOPIN) 0.5 MG tablet Take 1 tablet (0.5 mg total) by  mouth daily as needed for Anxiety. 30 tablet 0     No facility-administered encounter medications on file as of 11/14/2023.     No orders of the defined types were placed in this encounter.        Plan:     Problem List Items Addressed This Visit          Pulmonary    Mild intermittent asthma without complication - Primary     - new rx for albuterol and advair sent to pharmacy  - referral to PDM to assist with technique and education  - encouraged to stop smoking         Relevant Medications    albuterol (VENTOLIN HFA) 90 mcg/actuation inhaler    fluticasone-salmeterol diskus inhaler 250-50 mcg    Other Relevant Orders    Ambulatory referral/consult to Pulmonary Disease Management w/ Respiratory Therapist       Other    Nicotine dependence, cigarettes, uncomplicated     - discussed cessation, patient is planning on calling smoking cessation program back  - LDCT 11/23- lung-rads 1  - LDCT in one year         Relevant Orders    CT Chest Lung Screening Low Dose     Plan discussed with pt and she expressed understanding, all questions answered. RTC 3 months. Referral to PDM. Newly started on albuterol and advair.

## 2023-11-14 NOTE — ASSESSMENT & PLAN NOTE
- discussed cessation, patient is planning on calling smoking cessation program back  - LDCT 11/23- lung-rads 1  - LDCT in one year

## 2023-11-15 ENCOUNTER — TELEPHONE (OUTPATIENT)
Dept: PULMONOLOGY | Facility: CLINIC | Age: 63
End: 2023-11-15
Payer: MEDICARE

## 2023-11-15 NOTE — TELEPHONE ENCOUNTER
Chronic Disease Management:  Called patient to schedule initial Pulmonary Disease Management appointment.       Time spent on call    mins

## 2023-11-27 RX ORDER — TIZANIDINE 4 MG/1
4 TABLET ORAL EVERY 8 HOURS
Qty: 90 TABLET | Refills: 0 | Status: SHIPPED | OUTPATIENT
Start: 2023-11-27 | End: 2024-02-06

## 2023-12-05 ENCOUNTER — PATIENT MESSAGE (OUTPATIENT)
Dept: PSYCHIATRY | Facility: CLINIC | Age: 63
End: 2023-12-05
Payer: MEDICARE

## 2023-12-18 PROBLEM — Z00.00 WELLNESS EXAMINATION: Status: RESOLVED | Noted: 2023-09-18 | Resolved: 2023-12-18

## 2023-12-26 RX ORDER — CLONAZEPAM 0.5 MG/1
0.5 TABLET ORAL DAILY PRN
Qty: 30 TABLET | Refills: 2 | Status: SHIPPED | OUTPATIENT
Start: 2023-12-26 | End: 2024-03-07 | Stop reason: SDUPTHER

## 2024-01-25 RX ORDER — DESVENLAFAXINE 100 MG/1
100 TABLET, EXTENDED RELEASE ORAL DAILY
Qty: 30 TABLET | Refills: 1 | Status: SHIPPED | OUTPATIENT
Start: 2024-01-25 | End: 2024-03-07 | Stop reason: SDUPTHER

## 2024-02-06 RX ORDER — TIZANIDINE 4 MG/1
4 TABLET ORAL EVERY 8 HOURS
Qty: 90 TABLET | Refills: 0 | Status: SHIPPED | OUTPATIENT
Start: 2024-02-06 | End: 2024-03-25

## 2024-02-13 ENCOUNTER — TELEPHONE (OUTPATIENT)
Dept: PULMONOLOGY | Facility: CLINIC | Age: 64
End: 2024-02-13
Payer: MEDICARE

## 2024-03-04 ENCOUNTER — TELEPHONE (OUTPATIENT)
Dept: PSYCHIATRY | Facility: CLINIC | Age: 64
End: 2024-03-04
Payer: MEDICARE

## 2024-03-05 ENCOUNTER — TELEPHONE (OUTPATIENT)
Dept: PSYCHIATRY | Facility: CLINIC | Age: 64
End: 2024-03-05
Payer: MEDICARE

## 2024-03-07 ENCOUNTER — OFFICE VISIT (OUTPATIENT)
Dept: PSYCHIATRY | Facility: CLINIC | Age: 64
End: 2024-03-07
Payer: MEDICARE

## 2024-03-07 DIAGNOSIS — F41.1 GENERALIZED ANXIETY DISORDER: ICD-10-CM

## 2024-03-07 DIAGNOSIS — F32.A CHRONIC DEPRESSION: Primary | ICD-10-CM

## 2024-03-07 PROCEDURE — 3072F LOW RISK FOR RETINOPATHY: CPT | Mod: HCNC,CPTII,95, | Performed by: PSYCHIATRY & NEUROLOGY

## 2024-03-07 PROCEDURE — 99443 PR PHYSICIAN TELEPHONE EVALUATION 21-30 MIN: CPT | Mod: HCNC,95,, | Performed by: PSYCHIATRY & NEUROLOGY
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
## 2024-03-25 RX ORDER — TIZANIDINE 4 MG/1
4 TABLET ORAL EVERY 8 HOURS
Qty: 90 TABLET | Refills: 0 | Status: SHIPPED | OUTPATIENT
Start: 2024-03-25 | End: 2024-05-03

## 2024-03-25 NOTE — TELEPHONE ENCOUNTER
Refill Routing Note   Medication(s) are not appropriate for processing by [facility] for the following reason(s):        Non-participating provider    [facility] action(s):  Route      Medication Therapy Plan: PCP OUTSIDE OF SCOPE, DEFER TO YOU FOR REVIEW      Appointments  past 12m or future 3m with PCP    Date Provider   Last Visit   9/18/2023 Teofilo Morton MD   Next Visit   Visit date not found Teofilo Morton MD   ED visits in past 90 days: 0        Note composed:10:59 AM 03/25/2024

## 2024-04-09 ENCOUNTER — HOSPITAL ENCOUNTER (OUTPATIENT)
Dept: RADIOLOGY | Facility: HOSPITAL | Age: 64
Discharge: HOME | End: 2024-04-09
Attending: FAMILY MEDICINE
Payer: MEDICARE

## 2024-04-09 DIAGNOSIS — Z12.31 SCREENING MAMMOGRAM, ENCOUNTER FOR: ICD-10-CM

## 2024-04-09 PROCEDURE — 77063 BREAST TOMOSYNTHESIS BI: CPT | Mod: 26,HCNC,, | Performed by: RADIOLOGY

## 2024-04-09 PROCEDURE — 77067 SCR MAMMO BI INCL CAD: CPT | Mod: TC,HCNC,PO

## 2024-04-09 PROCEDURE — 77067 SCR MAMMO BI INCL CAD: CPT | Mod: 26,HCNC,, | Performed by: RADIOLOGY

## 2024-04-09 NOTE — PROGRESS NOTES
Results have been released via my chart. Please verify that these have been reviewed by the pt. If not, please call pt with results.       I have reviewed results.     MAMMOGRAM NORMAL-The mammogram screening for breast cancer is normal.  Recheck annually.    Please to do not hesitate to reach out for any additional questions or concerns.     Aimee Whitney NP

## 2024-04-10 ENCOUNTER — OFFICE VISIT (OUTPATIENT)
Dept: INTERNAL MEDICINE | Facility: CLINIC | Age: 64
End: 2024-04-10
Payer: MEDICARE

## 2024-04-10 ENCOUNTER — TELEPHONE (OUTPATIENT)
Dept: PSYCHIATRY | Facility: CLINIC | Age: 64
End: 2024-04-10
Payer: MEDICARE

## 2024-04-10 VITALS
OXYGEN SATURATION: 95 % | HEART RATE: 104 BPM | WEIGHT: 175.06 LBS | TEMPERATURE: 99 F | BODY MASS INDEX: 31.01 KG/M2 | SYSTOLIC BLOOD PRESSURE: 150 MMHG | DIASTOLIC BLOOD PRESSURE: 84 MMHG

## 2024-04-10 DIAGNOSIS — E11.59 HYPERTENSION ASSOCIATED WITH DIABETES: ICD-10-CM

## 2024-04-10 DIAGNOSIS — I15.2 HYPERTENSION ASSOCIATED WITH DIABETES: ICD-10-CM

## 2024-04-10 DIAGNOSIS — E78.5 HYPERLIPIDEMIA ASSOCIATED WITH TYPE 2 DIABETES MELLITUS: ICD-10-CM

## 2024-04-10 DIAGNOSIS — E11.36 TYPE 2 DIABETES MELLITUS WITH DIABETIC CATARACT, WITHOUT LONG-TERM CURRENT USE OF INSULIN: Primary | ICD-10-CM

## 2024-04-10 DIAGNOSIS — E11.69 HYPERLIPIDEMIA ASSOCIATED WITH TYPE 2 DIABETES MELLITUS: ICD-10-CM

## 2024-04-10 DIAGNOSIS — K21.9 GASTROESOPHAGEAL REFLUX DISEASE, UNSPECIFIED WHETHER ESOPHAGITIS PRESENT: ICD-10-CM

## 2024-04-10 PROCEDURE — 99214 OFFICE O/P EST MOD 30 MIN: CPT | Mod: HCNC,S$GLB,, | Performed by: NURSE PRACTITIONER

## 2024-04-10 PROCEDURE — 1159F MED LIST DOCD IN RCRD: CPT | Mod: HCNC,CPTII,S$GLB, | Performed by: NURSE PRACTITIONER

## 2024-04-10 PROCEDURE — 1160F RVW MEDS BY RX/DR IN RCRD: CPT | Mod: HCNC,CPTII,S$GLB, | Performed by: NURSE PRACTITIONER

## 2024-04-10 PROCEDURE — 3079F DIAST BP 80-89 MM HG: CPT | Mod: HCNC,CPTII,S$GLB, | Performed by: NURSE PRACTITIONER

## 2024-04-10 PROCEDURE — 3077F SYST BP >= 140 MM HG: CPT | Mod: HCNC,CPTII,S$GLB, | Performed by: NURSE PRACTITIONER

## 2024-04-10 PROCEDURE — 3008F BODY MASS INDEX DOCD: CPT | Mod: HCNC,CPTII,S$GLB, | Performed by: NURSE PRACTITIONER

## 2024-04-10 PROCEDURE — 3072F LOW RISK FOR RETINOPATHY: CPT | Mod: HCNC,CPTII,S$GLB, | Performed by: NURSE PRACTITIONER

## 2024-04-10 PROCEDURE — 99999 PR PBB SHADOW E&M-EST. PATIENT-LVL III: CPT | Mod: PBBFAC,HCNC,, | Performed by: NURSE PRACTITIONER

## 2024-04-10 RX ORDER — PANTOPRAZOLE SODIUM 40 MG/1
40 TABLET, DELAYED RELEASE ORAL DAILY
Qty: 90 TABLET | Refills: 1 | Status: SHIPPED | OUTPATIENT
Start: 2024-04-10

## 2024-04-10 RX ORDER — ATORVASTATIN CALCIUM 40 MG/1
40 TABLET, FILM COATED ORAL DAILY
Qty: 90 TABLET | Refills: 1 | Status: SHIPPED | OUTPATIENT
Start: 2024-04-10

## 2024-04-10 RX ORDER — LOSARTAN POTASSIUM AND HYDROCHLOROTHIAZIDE 12.5; 1 MG/1; MG/1
1 TABLET ORAL DAILY
Qty: 90 TABLET | Refills: 1 | Status: SHIPPED | OUTPATIENT
Start: 2024-04-10

## 2024-04-10 RX ORDER — METFORMIN HYDROCHLORIDE 500 MG/1
500 TABLET, EXTENDED RELEASE ORAL 2 TIMES DAILY WITH MEALS
Qty: 180 TABLET | Refills: 1 | Status: SHIPPED | OUTPATIENT
Start: 2024-04-10

## 2024-04-10 NOTE — PROGRESS NOTES
Subjective:       Patient ID: Melita Romero is a 63 y.o. female.    Chief Complaint: Medication Refill    Pt presents to clinic today for medication refills  Previous PCP Dr. Morton, plans to establish with Dr. Mcleod   Pt with hx of HTN, diabetes, GERD, chronic back pain, asthma  Overall reports she has been doing well  She is currently out of her bp meds  BP high today- normally well controlled  Due for updated labs        BP (!) 150/84   Pulse 104   Temp 99 °F (37.2 °C) (Tympanic)   Wt 79.4 kg (175 lb 0.7 oz)   SpO2 95%   BMI 31.01 kg/m²     Review of Systems   Constitutional:  Negative for activity change, appetite change, chills, diaphoresis, fatigue, fever and unexpected weight change.   HENT: Negative.     Respiratory:  Negative for cough and shortness of breath.    Cardiovascular:  Negative for chest pain, palpitations and leg swelling.   Gastrointestinal: Negative.    Genitourinary: Negative.    Musculoskeletal: Negative.    Skin:  Negative for color change, pallor, rash and wound.   Allergic/Immunologic: Negative for immunocompromised state.   Neurological: Negative.  Negative for dizziness and facial asymmetry.   Hematological:  Negative for adenopathy. Does not bruise/bleed easily.   Psychiatric/Behavioral:  Negative for agitation, behavioral problems and confusion.        Objective:      Physical Exam  Vitals and nursing note reviewed.   Constitutional:       General: She is not in acute distress.     Appearance: Normal appearance. She is well-developed. She is not diaphoretic.   HENT:      Head: Normocephalic and atraumatic.   Cardiovascular:      Rate and Rhythm: Normal rate and regular rhythm.      Heart sounds: Normal heart sounds. No murmur heard.  Pulmonary:      Effort: Pulmonary effort is normal. No respiratory distress.      Breath sounds: Normal breath sounds.   Musculoskeletal:         General: Normal range of motion.   Skin:     General: Skin is warm and dry.      Findings: No  rash.   Neurological:      Mental Status: She is alert.   Psychiatric:         Mood and Affect: Mood normal.         Behavior: Behavior normal. Behavior is cooperative.         Thought Content: Thought content normal.         Judgment: Judgment normal.         Assessment:       1. Type 2 diabetes mellitus with diabetic cataract, without long-term current use of insulin    2. Hyperlipidemia associated with type 2 diabetes mellitus    3. Hypertension associated with diabetes    4. Gastroesophageal reflux disease, unspecified whether esophagitis present    5. BMI 31.0-31.9,adult        Plan:       Melita was seen today for medication refill.    Diagnoses and all orders for this visit:    Type 2 diabetes mellitus with diabetic cataract, without long-term current use of insulin  -     metFORMIN (GLUCOPHAGE-XR) 500 MG ER 24hr tablet; Take 1 tablet (500 mg total) by mouth 2 (two) times daily with meals.  -     atorvastatin (LIPITOR) 40 MG tablet; Take 1 tablet (40 mg total) by mouth once daily.  -     CBC Auto Differential; Future  -     Comprehensive Metabolic Panel; Future  -     TSH; Future  -     Hemoglobin A1C; Future  -     Microalbumin/Creatinine Ratio, Urine; Future  - Chronic, stable. Continue meds as prescribed    Hyperlipidemia associated with type 2 diabetes mellitus  -     atorvastatin (LIPITOR) 40 MG tablet; Take 1 tablet (40 mg total) by mouth once daily.  -     CBC Auto Differential; Future  -     Comprehensive Metabolic Panel; Future  -     Lipid Panel; Future  - Chronic,s stable. Update FLP    Hypertension associated with diabetes  -     losartan-hydrochlorothiazide 100-12.5 mg (HYZAAR) 100-12.5 mg Tab; Take 1 tablet by mouth once daily.  -     CBC Auto Differential; Future  -     Comprehensive Metabolic Panel; Future  - Chronic, elevated today. Keep follow up as scheduled for BP recheck     Gastroesophageal reflux disease, unspecified whether esophagitis present  -     pantoprazole (PROTONIX) 40 MG  tablet; Take 1 tablet (40 mg total) by mouth once daily.    BMI 31.0-31.9,adult    Overall doing well  Will update labs and follow up with DR. Mcleod as scheduled  Follow up for worsening or no improvement in symptoms and PRN.

## 2024-04-12 ENCOUNTER — TELEPHONE (OUTPATIENT)
Dept: PSYCHIATRY | Facility: CLINIC | Age: 64
End: 2024-04-12
Payer: MEDICARE

## 2024-04-12 NOTE — TELEPHONE ENCOUNTER
----- Message from Lilia Matthew sent at 4/12/2024  7:41 AM CDT -----  Contact: Melita  .Patient is calling to speak with the nurse regarding appt today . Reports calling to reschedule and get a in person visit  . Please give patient a call back at   [phone]

## 2024-04-23 ENCOUNTER — OFFICE VISIT (OUTPATIENT)
Dept: UROLOGY | Facility: CLINIC | Age: 64
End: 2024-04-23
Payer: MEDICARE

## 2024-04-23 ENCOUNTER — HOSPITAL ENCOUNTER (OUTPATIENT)
Dept: RADIOLOGY | Facility: HOSPITAL | Age: 64
Discharge: HOME | End: 2024-04-23
Attending: UROLOGY
Payer: MEDICARE

## 2024-04-23 VITALS
RESPIRATION RATE: 17 BRPM | SYSTOLIC BLOOD PRESSURE: 156 MMHG | TEMPERATURE: 98 F | HEART RATE: 84 BPM | BODY MASS INDEX: 29.88 KG/M2 | WEIGHT: 168.63 LBS | DIASTOLIC BLOOD PRESSURE: 88 MMHG | HEIGHT: 63 IN

## 2024-04-23 DIAGNOSIS — N39.46 MIXED INCONTINENCE: Primary | ICD-10-CM

## 2024-04-23 DIAGNOSIS — N22 CALCULUS OF URINARY TRACT IN DISEASES CLASSIFIED ELSEWHERE: ICD-10-CM

## 2024-04-23 DIAGNOSIS — N32.81 OAB (OVERACTIVE BLADDER): ICD-10-CM

## 2024-04-23 DIAGNOSIS — R10.9 FLANK PAIN: ICD-10-CM

## 2024-04-23 LAB
BILIRUB UR QL STRIP: NEGATIVE
GLUCOSE UR QL STRIP: NEGATIVE
KETONES UR QL STRIP: POSITIVE
LEUKOCYTE ESTERASE UR QL STRIP: NEGATIVE
PH, POC UA: 5.5
POC BLOOD, URINE: NEGATIVE
POC NITRATES, URINE: NEGATIVE
PROT UR QL STRIP: POSITIVE
SP GR UR STRIP: 1.02 (ref 1–1.03)
UROBILINOGEN UR STRIP-ACNC: 0.2 (ref 0.1–1.1)

## 2024-04-23 PROCEDURE — 74176 CT ABD & PELVIS W/O CONTRAST: CPT | Mod: TC,HCNC,PN

## 2024-04-23 PROCEDURE — 99999 PR PBB SHADOW E&M-EST. PATIENT-LVL IV: CPT | Mod: PBBFAC,HCNC,, | Performed by: UROLOGY

## 2024-04-23 PROCEDURE — 1159F MED LIST DOCD IN RCRD: CPT | Mod: HCNC,CPTII,S$GLB, | Performed by: UROLOGY

## 2024-04-23 PROCEDURE — 3079F DIAST BP 80-89 MM HG: CPT | Mod: HCNC,CPTII,S$GLB, | Performed by: UROLOGY

## 2024-04-23 PROCEDURE — 3072F LOW RISK FOR RETINOPATHY: CPT | Mod: HCNC,CPTII,S$GLB, | Performed by: UROLOGY

## 2024-04-23 PROCEDURE — 74176 CT ABD & PELVIS W/O CONTRAST: CPT | Mod: 26,HCNC,, | Performed by: STUDENT IN AN ORGANIZED HEALTH CARE EDUCATION/TRAINING PROGRAM

## 2024-04-23 PROCEDURE — 3077F SYST BP >= 140 MM HG: CPT | Mod: HCNC,CPTII,S$GLB, | Performed by: UROLOGY

## 2024-04-23 PROCEDURE — 3008F BODY MASS INDEX DOCD: CPT | Mod: HCNC,CPTII,S$GLB, | Performed by: UROLOGY

## 2024-04-23 PROCEDURE — 99214 OFFICE O/P EST MOD 30 MIN: CPT | Mod: HCNC,S$GLB,, | Performed by: UROLOGY

## 2024-04-23 PROCEDURE — 81003 URINALYSIS AUTO W/O SCOPE: CPT | Mod: QW,HCNC,S$GLB, | Performed by: UROLOGY

## 2024-04-23 RX ORDER — TAMSULOSIN HYDROCHLORIDE 0.4 MG/1
0.4 CAPSULE ORAL DAILY
Qty: 20 CAPSULE | Refills: 0 | Status: SHIPPED | OUTPATIENT
Start: 2024-04-23 | End: 2024-05-13

## 2024-04-23 RX ORDER — ONDANSETRON 4 MG/1
4 TABLET, ORALLY DISINTEGRATING ORAL EVERY 8 HOURS PRN
Qty: 20 TABLET | Refills: 0 | Status: SHIPPED | OUTPATIENT
Start: 2024-04-23

## 2024-04-23 RX ORDER — OXYBUTYNIN CHLORIDE 10 MG/1
10 TABLET, EXTENDED RELEASE ORAL DAILY
Qty: 90 TABLET | Refills: 4 | Status: SHIPPED | OUTPATIENT
Start: 2024-04-23 | End: 2025-04-23

## 2024-04-23 NOTE — PROGRESS NOTES
Chief Complaint   Patient presents with    Annual Exam     W/ KUB         History of Present Illness:   Melita Romero is a 63 y.o. female here for evaluation of Annual Exam (W/ KUB)    4/23/24-Pt thinks she is passing a stone. She reports RLQ and R flank pain, which seemed to acutely worsen over the past 2 days. Slight dysuria. Not urinating much. Taking oxybutynin, which helps significantly.   4/18/23-oxybutynin helps significantly. Still wears 1 pad a day just in case. She has learned to not wait to go. No recent stone passage. She does have some right sided pain, unsure of etiology. No gross hematuria. No constipation.   KUB today personally reviewed, showing no obvious stone.   1/11/22-Pt reports h/o urolithiasis. She does take chronic pain medication, and states that she may not feel the pain. However, she does report sometimes she has bladder pain and right flank pain. She also has ADDY with sneeze, cough, run and laugh. She wears 2 pads per day. She does have some UUI first thing in the am. Incontinence has been ongoing for a year or so. Maybe more often now. She reports that a long time ago, she was on a bladder medication, but stopped it. She states that it did help. UUI>ADDY. No gross hematuria. Sometimes she has frequency up to 50 times a day.         Review of Systems   Constitutional:  Negative for chills and fever.   Respiratory:  Negative for shortness of breath.    Cardiovascular:  Negative for chest pain.   Gastrointestinal:  Negative for constipation and diarrhea.   Genitourinary:  Negative for difficulty urinating.   All other systems reviewed and are negative.      Past Medical History:   Diagnosis Date    Abnormal glucose     Anxiety     Arthritis     Back pain     Controlled type 2 diabetes mellitus without complication, without long-term current use of insulin 02/27/2017    DDD (degenerative disc disease), lumbar     Depression     Diverticulosis of colon 01/09/2020    Fibromyalgia      Flank pain 09/13/2021    History of colon polyps 11/06/2019    Hyperlipidemia     Hypertension     Mild intermittent asthma without complication 11/14/2023    Thyroid disease     Tobacco dependence     Trouble in sleeping     Urinary incontinence     wears pads daily       Past Surgical History:   Procedure Laterality Date    COLONOSCOPY N/A 01/09/2020    Procedure: COLONOSCOPY;  Surgeon: Jacob Lauren MD;  Location: [facility];  Service: Endoscopy;  Laterality: N/A;    HYSTERECTOMY  2003    complete    OOPHORECTOMY Bilateral 2003    SHOULDER ARTHROSCOPY Right 05/16/2017    Dr. Nima Coyne    TONSILLECTOMY         Family History   Problem Relation Name Age of Onset    Glaucoma Mother      Diabetes Mother      Hypertension Brother      Diabetes Brother      Heart disease Maternal Uncle         Social History     Tobacco Use    Smoking status: Every Day     Current packs/day: 0.00     Types: Cigarettes     Last attempt to quit: 7/20/2020     Years since quitting: 3.7    Smokeless tobacco: Never    Tobacco comments:     I quit a year, started smoking again.   Substance Use Topics    Alcohol use: Yes     Alcohol/week: 0.0 standard drinks of alcohol     Comment: rarely glass of wine    Drug use: No       Current Outpatient Medications   Medication Sig Dispense Refill    albuterol (VENTOLIN HFA) 90 mcg/actuation inhaler Inhale 2 puffs into the lungs every 6 (six) hours as needed for Wheezing. Rescue 18 g 5    aspirin 81 mg Tab Take 1 tablet by mouth Daily.      atorvastatin (LIPITOR) 40 MG tablet Take 1 tablet (40 mg total) by mouth once daily. 90 tablet 1    cholecalciferol, vitamin D3, 2,000 unit Tab Take 1 tablet by mouth Daily.      clonazePAM (KLONOPIN) 0.5 MG tablet Take 1 tablet (0.5 mg total) by mouth daily as needed for Anxiety. 30 tablet 2    desvenlafaxine succinate (PRISTIQ) 100 MG Tb24 Take 1 tablet (100 mg total) by mouth once daily. 30 tablet 3    fluticasone-salmeterol diskus inhaler 250-50 mcg Inhale 1  puff into the lungs 2 (two) times daily. Controller 60 each 5    hydrocodone-acetaminophen 10-325mg (NORCO)  mg Tab Take 1 tablet by mouth every 8 (eight) hours as needed. 75 tablet 0    losartan-hydrochlorothiazide 100-12.5 mg (HYZAAR) 100-12.5 mg Tab Take 1 tablet by mouth once daily. 90 tablet 1    metFORMIN (GLUCOPHAGE-XR) 500 MG ER 24hr tablet Take 1 tablet (500 mg total) by mouth 2 (two) times daily with meals. 180 tablet 1    multivitamin-Ca-iron-minerals 18-0.4 mg Tab Take 1 tablet by mouth Daily.      ondansetron (ZOFRAN-ODT) 4 MG TbDL Take 1 tablet (4 mg total) by mouth every 8 (eight) hours as needed (nausea). 20 tablet 0    oxybutynin (DITROPAN-XL) 10 MG 24 hr tablet Take 1 tablet (10 mg total) by mouth once daily. 90 tablet 4    pantoprazole (PROTONIX) 40 MG tablet Take 1 tablet (40 mg total) by mouth once daily. 90 tablet 1    tamsulosin (FLOMAX) 0.4 mg Cap Take 1 capsule (0.4 mg total) by mouth once daily. for 20 days 20 capsule 0    tiZANidine (ZANAFLEX) 4 MG tablet TAKE 1 TABLET (4 MG TOTAL) BY MOUTH EVERY 8 (EIGHT) HOURS. 90 tablet 0    traZODone (DESYREL) 100 MG tablet Take 1/2 to 1 tablet at bedtime as needed for sleep. 30 tablet 2    WOMEN'S DAILY FORMULA 18 mg iron-400 mcg-500 mg Ca Tab        No current facility-administered medications for this visit.       Review of patient's allergies indicates:   Allergen Reactions    Fish containing products Other (See Comments)     Difficulty breathing    Iodine Other (See Comments)    Shellfish containing products      Other reaction(s): Difficulty breathing  Other reaction(s): Difficulty breathing    Latex Rash       Physical Exam  Vitals:    04/23/24 1105   BP: (!) 156/88   Pulse: 84   Resp: 17   Temp: 98.1 °F (36.7 °C)     General: Well-developed, well-nourished, in no acute distress  HEENT: Normocephalic, atraumatic, extraocular movements intact  Neck: Supple, no supraclavicular or cervical lymphadenopathy, trachea midline  Respirations: even  and unlabored  Back: midline spine, +R CVA tenderness  Abdomen: soft, +RLQ tenderness, non-distended, no palpable masses, no rebound or guarding, well-healed pfannensteil scar  : 1/11/22-Normal external female genitalia without lesions. Orthotopic urethral meatus. atrophic vaginal mucosa. No Cysotcele, Grade 2 Rectocele, No apical prolapse, + cough stress test, no significant urethral hypermobility  Extremities: moves all equally, no clubbing, cyanosis or edema  Skin: Warm and dry. No lesions  Psych: normal affect  Neuro: Alert and oriented x 3. Cranial nerves II-XII intact      Urinalysis  pH, UA   Date Value Ref Range Status   04/23/2024 5.5  Final   09/13/2021 7.0 5.0 - 8.0 Final     Protein, UA   Date Value Ref Range Status   09/13/2021 Negative Negative Final     Comment:     Recommend a 24 hour urine protein or a urine   protein/creatinine ratio if globulin induced proteinuria is  clinically suspected.       Glucose, UA   Date Value Ref Range Status   09/13/2021 Negative Negative Final     Occult Blood UA   Date Value Ref Range Status   09/13/2021 Negative Negative Final     Nitrite, UA   Date Value Ref Range Status   01/11/2022 -  Final   09/13/2021 Negative Negative Final     Leukocytes, UA   Date Value Ref Range Status   09/13/2021 Negative Negative Final     STAT CT scan personally reviewed, noting non-obstructing renal nephroliths. Possible L proximal ureteral stone measuring 2mm, but suspect this is actually a phlebolith. No hydronephrosis.     Assessment:  1. Mixed incontinence  POCT Urinalysis, Dipstick, Automated, W/O Scope      2. Calculus of urinary tract in diseases classified elsewhere  CT Renal Stone Study ABD Pelvis WO      3. OAB (overactive bladder)        4. Flank pain                Plan:   Mixed incontinence  -     POCT Urinalysis, Dipstick, Automated, W/O Scope    Calculus of urinary tract in diseases classified elsewhere  -     CT Renal Stone Study ABD Pelvis WO; Future; Expected date:  04/23/2024    OAB (overactive bladder)    Flank pain    Other orders  -     oxybutynin (DITROPAN-XL) 10 MG 24 hr tablet; Take 1 tablet (10 mg total) by mouth once daily.  Dispense: 90 tablet; Refill: 4  -     tamsulosin (FLOMAX) 0.4 mg Cap; Take 1 capsule (0.4 mg total) by mouth once daily. for 20 days  Dispense: 20 capsule; Refill: 0  -     ondansetron (ZOFRAN-ODT) 4 MG TbDL; Take 1 tablet (4 mg total) by mouth every 8 (eight) hours as needed (nausea).  Dispense: 20 tablet; Refill: 0      Pt already on norco with pain management. Suspect calcification is a phlebolith. Pt to call if she is not improved in 2 weeks.   Follow up in about 1 year (around 4/23/2025).

## 2024-05-03 RX ORDER — TIZANIDINE 4 MG/1
4 TABLET ORAL EVERY 8 HOURS
Qty: 90 TABLET | Refills: 0 | Status: SHIPPED | OUTPATIENT
Start: 2024-05-03

## 2024-05-03 NOTE — TELEPHONE ENCOUNTER
Refill Routing Note   Medication(s) are not appropriate for processing by [facility] for the following reason(s):        Non-participating provider  Outside of protocol    [facility] action(s):  Route               Appointments  past 12m or future 3m with PCP    Date Provider   Last Visit   9/18/2023 Teofilo Morton MD   Next Visit   Visit date not found Teofilo Morton MD   ED visits in past 90 days: 0        Note composed:1:50 PM 05/03/2024

## 2024-06-20 ENCOUNTER — TELEPHONE (OUTPATIENT)
Dept: PSYCHIATRY | Facility: CLINIC | Age: 64
End: 2024-06-20
Payer: MEDICARE

## 2024-06-28 RX ORDER — TIZANIDINE 4 MG/1
4 TABLET ORAL EVERY 8 HOURS
Qty: 90 TABLET | Refills: 0 | Status: SHIPPED | OUTPATIENT
Start: 2024-06-28

## 2024-07-10 DIAGNOSIS — I51.89 LEFT VENTRICULAR DIASTOLIC DYSFUNCTION WITH PRESERVED SYSTOLIC FUNCTION: Primary | ICD-10-CM

## 2024-07-10 DIAGNOSIS — E11.69 HYPERLIPIDEMIA ASSOCIATED WITH TYPE 2 DIABETES MELLITUS: ICD-10-CM

## 2024-07-10 DIAGNOSIS — E78.5 HYPERLIPIDEMIA ASSOCIATED WITH TYPE 2 DIABETES MELLITUS: ICD-10-CM

## 2024-07-15 ENCOUNTER — HOSPITAL ENCOUNTER (OUTPATIENT)
Dept: CARDIOLOGY | Facility: HOSPITAL | Age: 64
Discharge: HOME | End: 2024-07-15
Attending: INTERNAL MEDICINE
Payer: MEDICARE

## 2024-07-15 ENCOUNTER — OFFICE VISIT (OUTPATIENT)
Dept: CARDIOLOGY | Facility: CLINIC | Age: 64
End: 2024-07-15
Payer: MEDICARE

## 2024-07-15 VITALS
WEIGHT: 170.88 LBS | OXYGEN SATURATION: 96 % | DIASTOLIC BLOOD PRESSURE: 82 MMHG | HEART RATE: 72 BPM | SYSTOLIC BLOOD PRESSURE: 144 MMHG | BODY MASS INDEX: 30.28 KG/M2 | HEIGHT: 63 IN

## 2024-07-15 DIAGNOSIS — I15.2 HYPERTENSION ASSOCIATED WITH DIABETES: ICD-10-CM

## 2024-07-15 DIAGNOSIS — E78.5 HYPERLIPIDEMIA ASSOCIATED WITH TYPE 2 DIABETES MELLITUS: ICD-10-CM

## 2024-07-15 DIAGNOSIS — Z87.891 EX-SMOKER: ICD-10-CM

## 2024-07-15 DIAGNOSIS — Z72.0 TOBACCO USE: ICD-10-CM

## 2024-07-15 DIAGNOSIS — E11.69 HYPERLIPIDEMIA ASSOCIATED WITH TYPE 2 DIABETES MELLITUS: ICD-10-CM

## 2024-07-15 DIAGNOSIS — E11.59 HYPERTENSION ASSOCIATED WITH DIABETES: ICD-10-CM

## 2024-07-15 DIAGNOSIS — E66.01 MORBID OBESITY: ICD-10-CM

## 2024-07-15 DIAGNOSIS — I70.0 AORTIC ATHEROSCLEROSIS: ICD-10-CM

## 2024-07-15 DIAGNOSIS — I51.89 LEFT VENTRICULAR DIASTOLIC DYSFUNCTION WITH PRESERVED SYSTOLIC FUNCTION: ICD-10-CM

## 2024-07-15 DIAGNOSIS — R06.09 DOE (DYSPNEA ON EXERTION): ICD-10-CM

## 2024-07-15 DIAGNOSIS — R07.89 OTHER CHEST PAIN: Primary | ICD-10-CM

## 2024-07-15 LAB
OHS QRS DURATION: 80 MS
OHS QTC CALCULATION: 427 MS

## 2024-07-15 PROCEDURE — 93005 ELECTROCARDIOGRAM TRACING: CPT | Mod: PO

## 2024-07-15 PROCEDURE — 3008F BODY MASS INDEX DOCD: CPT | Mod: CPTII,S$GLB,, | Performed by: INTERNAL MEDICINE

## 2024-07-15 PROCEDURE — 1159F MED LIST DOCD IN RCRD: CPT | Mod: CPTII,S$GLB,, | Performed by: INTERNAL MEDICINE

## 2024-07-15 PROCEDURE — 93010 ELECTROCARDIOGRAM REPORT: CPT | Mod: ,,, | Performed by: INTERNAL MEDICINE

## 2024-07-15 PROCEDURE — 3077F SYST BP >= 140 MM HG: CPT | Mod: CPTII,S$GLB,, | Performed by: INTERNAL MEDICINE

## 2024-07-15 PROCEDURE — 99214 OFFICE O/P EST MOD 30 MIN: CPT | Mod: S$GLB,,, | Performed by: INTERNAL MEDICINE

## 2024-07-15 PROCEDURE — G2211 COMPLEX E/M VISIT ADD ON: HCPCS | Mod: S$GLB,,, | Performed by: INTERNAL MEDICINE

## 2024-07-15 PROCEDURE — 99999 PR PBB SHADOW E&M-EST. PATIENT-LVL IV: CPT | Mod: PBBFAC,,, | Performed by: INTERNAL MEDICINE

## 2024-07-15 PROCEDURE — 3079F DIAST BP 80-89 MM HG: CPT | Mod: CPTII,S$GLB,, | Performed by: INTERNAL MEDICINE

## 2024-07-15 PROCEDURE — 3072F LOW RISK FOR RETINOPATHY: CPT | Mod: CPTII,S$GLB,, | Performed by: INTERNAL MEDICINE

## 2024-07-15 PROCEDURE — 1160F RVW MEDS BY RX/DR IN RCRD: CPT | Mod: CPTII,S$GLB,, | Performed by: INTERNAL MEDICINE

## 2024-07-15 RX ORDER — AMLODIPINE BESYLATE 5 MG/1
5 TABLET ORAL DAILY
Qty: 30 TABLET | Refills: 1 | Status: SHIPPED | OUTPATIENT
Start: 2024-07-15 | End: 2025-07-15

## 2024-07-15 RX ORDER — IBUPROFEN 200 MG
1 TABLET ORAL DAILY
Qty: 30 PATCH | Refills: 1 | Status: SHIPPED | OUTPATIENT
Start: 2024-07-15

## 2024-07-15 NOTE — PROGRESS NOTES
Subjective:   Patient ID:  Melita Romero is a 63 y.o. female who presents for cardiac consult of No chief complaint on file.    Referring Physician: Teofilo Morton MD   [id number] [place] [address]    Reason for consult: CHF, HTN      HPI  The patient came in today for cardiac consult of No chief complaint on file.      Melita Romero is a 63 y.o. female pt with HTN, diastolic dysfunction, aortic atherosc, HLD, obesity, NALLELY, GERD, tobacco abuse presents for follow up CV eval.     10/9/23  Follow up since 12/2021. Recent lipids improved Tc - 159, LDL 80,  - 9/2023.   BP elevated 130s/90s. HR 80s. BMI 31 - 176 lbs.     She is still smoking wants to quit soon. Still has mild TAYLOR.   ECG - NSR , poor RWP - old     7/15/24  BP mildly elevated. HR 70s. BMI 30 - 170 lbs   She is losing weight. She is still smoking wants to quit.     ECG  - NSR, LAD         CONCLUSIONS     1 - Concentric hypertrophy.     2 - Normal left ventricular systolic function (EF 60-65%).     3 - Left ventricular diastolic dysfunction.     4 - Normal right ventricular systolic function .     This document has been electronically    SIGNED BY: Brian Joyner MD On: 04/16/2014 08:02    Past Medical History:   Diagnosis Date    Abnormal glucose     Anxiety     Arthritis     Back pain     Controlled type 2 diabetes mellitus without complication, without long-term current use of insulin 02/27/2017    DDD (degenerative disc disease), lumbar     Depression     Diverticulosis of colon 01/09/2020    Fibromyalgia     Flank pain 09/13/2021    History of colon polyps 11/06/2019    Hyperlipidemia     Hypertension     Mild intermittent asthma without complication 11/14/2023    Thyroid disease     Tobacco dependence     Trouble in sleeping     Urinary incontinence     wears pads daily       Past Surgical History:   Procedure Laterality Date    COLONOSCOPY N/A 01/09/2020    Procedure: COLONOSCOPY;  Surgeon: Jacob Lauren MD;  Location:  [facility] ENDO;  Service: Endoscopy;  Laterality: N/A;    HYSTERECTOMY  2003    complete    OOPHORECTOMY Bilateral 2003    SHOULDER ARTHROSCOPY Right 05/16/2017    Dr. Nima Coyne    TONSILLECTOMY         Social History     Tobacco Use    Smoking status: Every Day     Current packs/day: 0.00     Types: Cigarettes     Last attempt to quit: 7/20/2020     Years since quitting: 3.9    Smokeless tobacco: Never    Tobacco comments:     I quit a year, started smoking again.   Substance Use Topics    Alcohol use: Yes     Alcohol/week: 0.0 standard drinks of alcohol     Comment: rarely glass of wine    Drug use: No       Family History   Problem Relation Name Age of Onset    Glaucoma Mother      Diabetes Mother      Hypertension Brother      Diabetes Brother      Heart disease Maternal Uncle         Patient's Medications   New Prescriptions    No medications on file   Previous Medications    ALBUTEROL (VENTOLIN HFA) 90 MCG/ACTUATION INHALER    Inhale 2 puffs into the lungs every 6 (six) hours as needed for Wheezing. Rescue    ASPIRIN 81 MG TAB    Take 1 tablet by mouth Daily.    ATORVASTATIN (LIPITOR) 40 MG TABLET    Take 1 tablet (40 mg total) by mouth once daily.    CHOLECALCIFEROL, VITAMIN D3, 2,000 UNIT TAB    Take 1 tablet by mouth Daily.    CLONAZEPAM (KLONOPIN) 0.5 MG TABLET    Take 1 tablet (0.5 mg total) by mouth daily as needed for Anxiety.    DESVENLAFAXINE SUCCINATE (PRISTIQ) 100 MG TB24    Take 1 tablet (100 mg total) by mouth once daily.    FLUTICASONE-SALMETEROL DISKUS INHALER 250-50 MCG    Inhale 1 puff into the lungs 2 (two) times daily. Controller    HYDROCODONE-ACETAMINOPHEN 10-325MG (NORCO)  MG TAB    Take 1 tablet by mouth every 8 (eight) hours as needed.    LOSARTAN-HYDROCHLOROTHIAZIDE 100-12.5 MG (HYZAAR) 100-12.5 MG TAB    Take 1 tablet by mouth once daily.    METFORMIN (GLUCOPHAGE-XR) 500 MG ER 24HR TABLET    Take 1 tablet (500 mg total) by mouth 2 (two) times daily with meals.     "MULTIVITAMIN-CA-IRON-MINERALS 18-0.4 MG TAB    Take 1 tablet by mouth Daily.    ONDANSETRON (ZOFRAN-ODT) 4 MG TBDL    Take 1 tablet (4 mg total) by mouth every 8 (eight) hours as needed (nausea).    OXYBUTYNIN (DITROPAN-XL) 10 MG 24 HR TABLET    Take 1 tablet (10 mg total) by mouth once daily.    PANTOPRAZOLE (PROTONIX) 40 MG TABLET    Take 1 tablet (40 mg total) by mouth once daily.    TAMSULOSIN (FLOMAX) 0.4 MG CAP    Take 1 capsule (0.4 mg total) by mouth once daily. for 20 days    TIZANIDINE (ZANAFLEX) 4 MG TABLET    TAKE 1 TABLET BY MOUTH EVERY 8 (EIGHT) HOURS    TRAZODONE (DESYREL) 100 MG TABLET    Take 1/2 to 1 tablet at bedtime as needed for sleep.    WOMEN'S DAILY FORMULA 18 MG IRON-400 MCG-500 MG CA TAB       Modified Medications    No medications on file   Discontinued Medications    No medications on file       Review of Systems   Constitutional:  Positive for malaise/fatigue.   HENT: Negative.     Eyes: Negative.    Respiratory:  Positive for shortness of breath.    Cardiovascular:  Positive for chest pain.   Gastrointestinal: Negative.    Genitourinary: Negative.    Musculoskeletal: Negative.    Skin: Negative.    Neurological: Negative.    Endo/Heme/Allergies: Negative.    Psychiatric/Behavioral: Negative.     All 12 systems otherwise negative.      Wt Readings from Last 3 Encounters:   07/15/24 77.5 kg (170 lb 13.7 oz)   04/23/24 76.5 kg (168 lb 10.4 oz)   04/10/24 79.4 kg (175 lb 0.7 oz)     Temp Readings from Last 3 Encounters:   04/23/24 98.1 °F (36.7 °C) (Oral)   04/10/24 99 °F (37.2 °C) (Tympanic)   09/18/23 98.1 °F (36.7 °C)     BP Readings from Last 3 Encounters:   07/15/24 (!) 144/82   04/23/24 (!) 156/88   04/10/24 (!) 150/84     Pulse Readings from Last 3 Encounters:   07/15/24 72   04/23/24 84   04/10/24 104       BP (!) 144/82 (BP Location: Right arm, Patient Position: Sitting, BP Method: Medium (Manual))   Pulse 72   Ht 5' 3" (1.6 m)   Wt 77.5 kg (170 lb 13.7 oz)   SpO2 96%   BMI " 30.27 kg/m²     Objective:   Physical Exam  Vitals and nursing note reviewed.   Constitutional:       General: She is not in acute distress.     Appearance: She is well-developed. She is not diaphoretic.   HENT:      Head: Normocephalic and atraumatic.      Nose: Nose normal.   Eyes:      General: No scleral icterus.     Conjunctiva/sclera: Conjunctivae normal.   Neck:      Thyroid: No thyromegaly.      Vascular: No JVD.   Cardiovascular:      Rate and Rhythm: Normal rate and regular rhythm.      Heart sounds: S1 normal and S2 normal. No murmur heard.     No friction rub. No gallop. No S3 or S4 sounds.   Pulmonary:      Effort: Pulmonary effort is normal. No respiratory distress.      Breath sounds: Normal breath sounds. No stridor. No wheezing or rales.   Chest:      Chest wall: No tenderness.   Abdominal:      General: Bowel sounds are normal. There is no distension.      Palpations: Abdomen is soft. There is no mass.      Tenderness: There is no abdominal tenderness. There is no rebound.   Genitourinary:     Comments: Deferred  Musculoskeletal:         General: No tenderness or deformity. Normal range of motion.      Cervical back: Normal range of motion and neck supple.   Lymphadenopathy:      Cervical: No cervical adenopathy.   Skin:     General: Skin is warm and dry.      Coloration: Skin is not pale.      Findings: No erythema or rash.   Neurological:      Mental Status: She is alert and oriented to person, place, and time.      Motor: No abnormal muscle tone.      Coordination: Coordination normal.   Psychiatric:         Behavior: Behavior normal.         Thought Content: Thought content normal.         Judgment: Judgment normal.         Lab Results   Component Value Date     09/18/2023    K 4.6 09/18/2023     09/18/2023    CO2 33 (H) 09/18/2023    BUN 9 09/18/2023    CREATININE 0.8 09/18/2023    GLU 94 09/18/2023    HGBA1C 5.2 09/18/2023    AST 19 09/18/2023    ALT 20 09/18/2023    ALBUMIN 4.4  09/18/2023    ALBUMIN 4.5 05/01/2022    PROT 7.4 09/18/2023    BILITOT 0.3 09/18/2023    WBC 6.94 09/18/2023    HGB 15.0 09/18/2023    HCT 45.5 09/18/2023    MCV 97 09/18/2023     09/18/2023    TSH 1.508 09/18/2023    CHOL 159 09/18/2023    HDL 48 09/18/2023    LDLCALC 80.6 09/18/2023    TRIG 152 (H) 09/18/2023     Assessment:      1. Other chest pain    2. Morbid obesity    3. Aortic atherosclerosis    4. Ex-smoker    5. Hyperlipidemia associated with type 2 diabetes mellitus    6. Left ventricular diastolic dysfunction with preserved systolic function    7. TAYLOR (dyspnea on exertion)    8. Hypertension associated with diabetes          Plan:   1. HTN with diastolic dysfunction - elevated   - elevated at times  - titrate meds - add Norvasc 5mg     2. PreDM/DM2 - A1c 5.7 --> 5.2  - cont tx - metformin    3. HLD with aortic atheros  - titrate meds  - Recent lipids improved Tc - 159, LDL 80,  - 9/2023.     4. Tobacco abuse  - is trying to quit   - will refer to cessation  - start nicotine patch     5. CP/TAYLOR  - mild,  - r/o non cardiac etiologies   - will f/u pulm    6. GERD  - cont PPI    7. Obesity -  BMI 31 - 176 lbs.   - cont weight loss     Visit today included increased complexity associated with the care of the episodic problem HTN  addressed and managing the longitudinal care of the patient due to the serious and/or complex managed problem(s) .      Thank you for allowing me to participate in this patient's care. Please do not hesitate to contact me with any questions or concerns. Consult note has been forwarded to the referral physician.

## 2024-07-16 ENCOUNTER — TELEPHONE (OUTPATIENT)
Dept: CARDIOLOGY | Facility: CLINIC | Age: 64
End: 2024-07-16
Payer: MEDICARE

## 2024-07-16 NOTE — TELEPHONE ENCOUNTER
(Nicoderm Patch) You asked for an over-the-counter (OTC) drug. The Medicare rule in the Prescription Drug Benefit Manual (Chapter 6, Section 10.10) says OTC drugs are excluded from Part D coverage. Per Medicare rules, this drug cannot be covered.

## 2024-07-16 NOTE — TELEPHONE ENCOUNTER
You asked for an over-the-counter (OTC) drug. The Medicare rule in the Prescription Drug Benefit Manual (Chapter 6, Section 10.10) says OTC drugs are excluded from Part D coverage. Per Medicare rules, this drug cannot be covered. (Click CANCEL or DELETE if this coverage request is no longer needed.)  for her mack patches does she not qualify for smoking cessation?

## 2024-07-22 ENCOUNTER — LAB VISIT (OUTPATIENT)
Dept: LAB | Facility: HOSPITAL | Age: 64
End: 2024-07-22
Attending: NURSE PRACTITIONER
Payer: MEDICARE

## 2024-07-22 DIAGNOSIS — E11.36 TYPE 2 DIABETES MELLITUS WITH DIABETIC CATARACT, WITHOUT LONG-TERM CURRENT USE OF INSULIN: ICD-10-CM

## 2024-07-22 LAB
ALBUMIN/CREAT UR: NORMAL UG/MG (ref 0–30)
CREAT UR-MCNC: 55 MG/DL (ref 15–325)
MICROALBUMIN UR DL<=1MG/L-MCNC: <5 UG/ML

## 2024-07-22 PROCEDURE — 82570 ASSAY OF URINE CREATININE: CPT | Mod: HCNC | Performed by: NURSE PRACTITIONER

## 2024-07-22 PROCEDURE — 82043 UR ALBUMIN QUANTITATIVE: CPT | Mod: HCNC | Performed by: NURSE PRACTITIONER

## 2024-07-29 ENCOUNTER — OFFICE VISIT (OUTPATIENT)
Dept: INTERNAL MEDICINE | Facility: CLINIC | Age: 64
End: 2024-07-29
Payer: MEDICARE

## 2024-07-29 VITALS
DIASTOLIC BLOOD PRESSURE: 80 MMHG | OXYGEN SATURATION: 97 % | HEIGHT: 63 IN | BODY MASS INDEX: 30.54 KG/M2 | WEIGHT: 172.38 LBS | SYSTOLIC BLOOD PRESSURE: 120 MMHG | TEMPERATURE: 97 F | HEART RATE: 120 BPM

## 2024-07-29 DIAGNOSIS — J45.20 MILD INTERMITTENT ASTHMA WITHOUT COMPLICATION: Chronic | ICD-10-CM

## 2024-07-29 DIAGNOSIS — F17.210 NICOTINE DEPENDENCE, CIGARETTES, UNCOMPLICATED: ICD-10-CM

## 2024-07-29 DIAGNOSIS — E78.5 HYPERLIPIDEMIA ASSOCIATED WITH TYPE 2 DIABETES MELLITUS: Chronic | ICD-10-CM

## 2024-07-29 DIAGNOSIS — K21.9 GASTROESOPHAGEAL REFLUX DISEASE, UNSPECIFIED WHETHER ESOPHAGITIS PRESENT: Chronic | ICD-10-CM

## 2024-07-29 DIAGNOSIS — F41.1 GAD (GENERALIZED ANXIETY DISORDER): Chronic | ICD-10-CM

## 2024-07-29 DIAGNOSIS — E11.69 HYPERLIPIDEMIA ASSOCIATED WITH TYPE 2 DIABETES MELLITUS: Chronic | ICD-10-CM

## 2024-07-29 DIAGNOSIS — E11.59 HYPERTENSION ASSOCIATED WITH DIABETES: Primary | Chronic | ICD-10-CM

## 2024-07-29 DIAGNOSIS — E11.36 TYPE 2 DIABETES MELLITUS WITH DIABETIC CATARACT, WITHOUT LONG-TERM CURRENT USE OF INSULIN: Chronic | ICD-10-CM

## 2024-07-29 DIAGNOSIS — I15.2 HYPERTENSION ASSOCIATED WITH DIABETES: Primary | Chronic | ICD-10-CM

## 2024-07-29 PROCEDURE — 99214 OFFICE O/P EST MOD 30 MIN: CPT | Mod: HCNC,S$GLB,, | Performed by: FAMILY MEDICINE

## 2024-07-29 PROCEDURE — 3008F BODY MASS INDEX DOCD: CPT | Mod: HCNC,CPTII,S$GLB, | Performed by: FAMILY MEDICINE

## 2024-07-29 PROCEDURE — 3044F HG A1C LEVEL LT 7.0%: CPT | Mod: HCNC,CPTII,S$GLB, | Performed by: FAMILY MEDICINE

## 2024-07-29 PROCEDURE — 99999 PR PBB SHADOW E&M-EST. PATIENT-LVL IV: CPT | Mod: PBBFAC,HCNC,, | Performed by: FAMILY MEDICINE

## 2024-07-29 PROCEDURE — 1160F RVW MEDS BY RX/DR IN RCRD: CPT | Mod: HCNC,CPTII,S$GLB, | Performed by: FAMILY MEDICINE

## 2024-07-29 PROCEDURE — 3072F LOW RISK FOR RETINOPATHY: CPT | Mod: HCNC,CPTII,S$GLB, | Performed by: FAMILY MEDICINE

## 2024-07-29 PROCEDURE — 3066F NEPHROPATHY DOC TX: CPT | Mod: HCNC,CPTII,S$GLB, | Performed by: FAMILY MEDICINE

## 2024-07-29 PROCEDURE — G2211 COMPLEX E/M VISIT ADD ON: HCPCS | Mod: HCNC,S$GLB,, | Performed by: FAMILY MEDICINE

## 2024-07-29 PROCEDURE — 1159F MED LIST DOCD IN RCRD: CPT | Mod: HCNC,CPTII,S$GLB, | Performed by: FAMILY MEDICINE

## 2024-07-29 PROCEDURE — 3074F SYST BP LT 130 MM HG: CPT | Mod: HCNC,CPTII,S$GLB, | Performed by: FAMILY MEDICINE

## 2024-07-29 PROCEDURE — 3061F NEG MICROALBUMINURIA REV: CPT | Mod: HCNC,CPTII,S$GLB, | Performed by: FAMILY MEDICINE

## 2024-07-29 PROCEDURE — 3079F DIAST BP 80-89 MM HG: CPT | Mod: HCNC,CPTII,S$GLB, | Performed by: FAMILY MEDICINE

## 2024-07-29 RX ORDER — TIZANIDINE 4 MG/1
4 TABLET ORAL EVERY 8 HOURS
Qty: 90 TABLET | Refills: 1 | Status: SHIPPED | OUTPATIENT
Start: 2024-07-29

## 2024-07-29 RX ORDER — DESVENLAFAXINE 100 MG/1
100 TABLET, EXTENDED RELEASE ORAL DAILY
Qty: 30 TABLET | Refills: 2 | Status: SHIPPED | OUTPATIENT
Start: 2024-07-29 | End: 2025-07-29

## 2024-07-29 RX ORDER — LOSARTAN POTASSIUM AND HYDROCHLOROTHIAZIDE 12.5; 1 MG/1; MG/1
1 TABLET ORAL DAILY
Qty: 90 TABLET | Refills: 3 | Status: SHIPPED | OUTPATIENT
Start: 2024-07-29

## 2024-07-29 RX ORDER — ATORVASTATIN CALCIUM 40 MG/1
40 TABLET, FILM COATED ORAL DAILY
Qty: 90 TABLET | Refills: 3 | Status: SHIPPED | OUTPATIENT
Start: 2024-07-29

## 2024-07-29 RX ORDER — PANTOPRAZOLE SODIUM 40 MG/1
40 TABLET, DELAYED RELEASE ORAL DAILY
Qty: 90 TABLET | Refills: 3 | Status: SHIPPED | OUTPATIENT
Start: 2024-07-29

## 2024-07-29 RX ORDER — METFORMIN HYDROCHLORIDE 500 MG/1
500 TABLET, EXTENDED RELEASE ORAL 2 TIMES DAILY WITH MEALS
Qty: 180 TABLET | Refills: 3 | Status: SHIPPED | OUTPATIENT
Start: 2024-07-29

## 2024-07-29 NOTE — PROGRESS NOTES
Subjective     Patient ID: Melita Romero is a 63 y.o. female.    Chief Complaint: Establish Care    History of Present Illness    Melita presents today to establish care. She has Hypertension, Type 2 Diabetes, Hyperlipidmia, Gerd, Asthma, Depression, Anxiety and Insomnia.    Current medications include Protonix, Atorvastatin, Metformin, Amlodipine (recently prescribed for hypertension), Aspirin (OTC), Vitamin D (OTC), Clonazepam (for anxiety), Pristiq, Oxybutynin (for bladder issues), Tizanidine (due for refill), and Trazodone (as needed for sleep, sometimes half dose). Melita takes Trazodone after two consecutive sleepless nights, reporting improved rest but preferring not to use regularly. She uses [facility] pharmacy in [place] for prescriptions. No longer using Nicoderm patches or tamsulosin.    Melita expresses readiness to quit smoking and plans to set up a cessation class. She has made previous attempts, including quitting for 11 months independently and through a class, but relapsed due to a triggering event. She acknowledges the challenge of quitting, particularly due to stress and anxiety, and recognizes it may take multiple attempts. Melita views smoking as both an addiction and a habit, noting difficulty in breaking morning routines. She is motivated to quit for improved breathing and recognizes the influence of family history, mentioning her father's long-term smoking before quitting.    Melita has a history of asthma (stable, followed by pulmonology), kidney stones (past, short-term tamsulosin treatment), anxiety (treated by Dr. Jerardo Chaudhari with clonazepam), and depression (managed with Pristiq). She reports occasional chest tightness but denies current wheezing or chest pain.    Cardiology: For blood pressure management. Pulmonology: Diagnosed with mild intermittent asthma in November, prescribed two inhalers. CT showed no emphysema, and patient denies COPD diagnosis. Pain  Management: For medication management, patient emphasizes strict adherence. Urology (Dr. Upton): For bladder issues, prescribed oxybutynin. Psychiatry (Dr. Rae): For mental health management, prescribing clonazepam and Pristiq.    Recent labs show excellent overall health. A1C 5.2 (normal range), good cholesterol levels, normal thyroid function, sodium, potassium, glucose, liver function, and kidney function tests.    Eye exam in November, received new glasses two weeks ago. No recent foot exam, denies foot issues. Unsure about shingles vaccine, no recent tetanus vaccine. Acknowledges need for dental visit.    Melita describes herself as empathetic, viewing it as both a gift and a challenge that can increase her anxiety. She is coping with the loss of her , which prompted her to question her purpose. She has found meaning in helping others, viewing it as a divine purpose and blessing.    Currently stable and followed by pulmonology. Uses prescribed inhalers and reports occasional tightness but denies current wheezing.    ROS:  General: -fever, -chills, -fatigue, -weight gain, -weight loss  Eyes: -vision changes, -redness, -discharge  ENT: -ear pain, -nasal congestion, -sore throat  Cardiovascular: -chest pain, -palpitations, -lower extremity edema  Respiratory: -cough, -shortness of breath  Gastrointestinal: -abdominal pain, -nausea, -vomiting, -diarrhea, -constipation, -blood in stool  Genitourinary: -dysuria, -hematuria, -frequency  Musculoskeletal: -joint pain, -muscle pain  Skin: -rash, -lesion  Neurological: -headache, -dizziness, -numbness, -tingling  Psychiatric: -anxiety, -depression, -sleep difficulty            Objective     Physical Exam  Vitals and nursing note reviewed.   Constitutional:       Appearance: Normal appearance. She is obese.   HENT:      Head: Normocephalic and atraumatic.      Right Ear: Tympanic membrane, ear canal and external ear normal.      Left Ear: Tympanic membrane,  ear canal and external ear normal.      Nose: Nose normal.      Mouth/Throat:      Mouth: Mucous membranes are moist.      Pharynx: Oropharynx is clear.   Eyes:      Extraocular Movements: Extraocular movements intact.      Conjunctiva/sclera: Conjunctivae normal.   Cardiovascular:      Rate and Rhythm: Normal rate and regular rhythm.      Pulses: Normal pulses.      Heart sounds: Normal heart sounds.   Pulmonary:      Effort: Pulmonary effort is normal.      Breath sounds: Normal breath sounds.   Abdominal:      General: Abdomen is flat. Bowel sounds are normal.      Palpations: Abdomen is soft.   Musculoskeletal:         General: Normal range of motion.      Cervical back: Normal range of motion and neck supple.      Right lower leg: No edema.      Left lower leg: No edema.   Lymphadenopathy:      Cervical: No cervical adenopathy.   Skin:     General: Skin is warm and dry.   Neurological:      General: No focal deficit present.      Mental Status: She is alert and oriented to person, place, and time.   Psychiatric:         Mood and Affect: Mood normal.         Behavior: Behavior normal.                    Assessment and Plan     1. Hypertension associated with diabetes  Overview:  Chronic, Stable, cont losartan-hctz    Orders:  -     losartan-hydrochlorothiazide 100-12.5 mg (HYZAAR) 100-12.5 mg Tab; Take 1 tablet by mouth once daily.  Dispense: 90 tablet; Refill: 3    2. Type 2 diabetes mellitus with diabetic cataract, without long-term current use of insulin  Overview:  Chronic, Stable, cont metformin    Orders:  -     atorvastatin (LIPITOR) 40 MG tablet; Take 1 tablet (40 mg total) by mouth once daily.  Dispense: 90 tablet; Refill: 3  -     metFORMIN (GLUCOPHAGE-XR) 500 MG ER 24hr tablet; Take 1 tablet (500 mg total) by mouth 2 (two) times daily with meals.  Dispense: 180 tablet; Refill: 3  -     Ambulatory referral/consult to Podiatry; Future; Expected date: 08/05/2024    3. Gastroesophageal reflux disease,  unspecified whether esophagitis present  Comments:  stable, cont protonix  Orders:  -     pantoprazole (PROTONIX) 40 MG tablet; Take 1 tablet (40 mg total) by mouth once daily.  Dispense: 90 tablet; Refill: 3    4. Hyperlipidemia associated with type 2 diabetes mellitus  Comments:  stable, cont atorvastatin  Overview:  Chronic, Stable, cont statin    Orders:  -     atorvastatin (LIPITOR) 40 MG tablet; Take 1 tablet (40 mg total) by mouth once daily.  Dispense: 90 tablet; Refill: 3    5. Mild intermittent asthma without complication  Comments:  stable, cont LABA inhaler per pulm    6. Nicotine dependence, cigarettes, uncomplicated    7. NALLELY (generalized anxiety disorder)  Comments:  stable, cont prn clonazepam per psych    Other orders  -     tiZANidine (ZANAFLEX) 4 MG tablet; Take 1 tablet (4 mg total) by mouth every 8 (eight) hours.  Dispense: 90 tablet; Refill: 1        Assessment & Plan    BLADDER ISSUES:   The patient is taking oxybutynin prescribed by Dr. Upton for bladder issues.  ASTHMA:   The patient was diagnosed with asthma by pulmonology in November after undergoing pulmonary function tests and a walk test.   Noted that the patient is following with pulmonology and condition is stable.  CHRONIC PAIN:   The patient is seeing pain management for chronic pain treatment.   Continued tizanidine prescribed for pain management, with a 90-day supply last refilled in June.   Provided 1 refill on file.  SHINGLES VACCINATION:   Discussed shingles vaccine with the patient, explaining its importance in reducing risk and severity of shingles outbreaks.   Advised follow up for shingles vaccine, if desired, at next visit with Yajaira.  TETANUS VACCINATION:   Recommend tetanus vaccine and offered the option to schedule a nurse visit to receive it at any time.  INSOMNIA:   The patient is taking trazodone prescribed by Dr. Kurtz for sleep, taken intermittently at half dose.   Discussed the patient's use of trazodone,  noting occasional use and sometimes only taking half a dose.   Suggested the patient may need to take trazodone more regularly during smoking cessation to help with sleep.  ANXIETY:   The patient is taking clonazepam and Pristiq prescribed by Dr. Jerardo Chaudhari for anxiety.   Acknowledged the patient's anxiety and its impact on smoking cessation and empathy towards others.  SMOKING CESSATION:   Discussed the patient's readiness to quit smoking and acknowledged the challenges associated with quitting.   The patient plans to set up a smoking cessation class.   Kept Nicoderm patches prescription active for future use.   Encouraged the patient to be patient with themselves during the quitting process.  DENTAL CARE:   Educated on importance of regular dental care to monitor for infection.  PODIATRY REFERRAL:   Referred to Podiatry for routine diabetic foot exam and to monitor for peripheral vascular disease given smoking history.              Follow up in about 3 months (around 10/29/2024) for Annual Exam.    This note was generated with the assistance of ambient listening technology. Verbal consent was obtained by the patient and accompanying visitor(s) for the recording of patient appointment to facilitate this note. I attest to having reviewed and edited the generated note for accuracy, though some syntax or spelling errors may persist. Please contact the author of this note for any clarification.    Visit today included increased complexity associated with the care of the episodic problem  addressed and managing the longitudinal care of the patient due to the serious and/or complex managed problem(s) .

## 2024-08-02 ENCOUNTER — PATIENT MESSAGE (OUTPATIENT)
Dept: PSYCHIATRY | Facility: CLINIC | Age: 64
End: 2024-08-02
Payer: MEDICARE

## 2024-08-06 ENCOUNTER — OFFICE VISIT (OUTPATIENT)
Dept: PSYCHIATRY | Facility: CLINIC | Age: 64
End: 2024-08-06
Payer: MEDICARE

## 2024-08-06 DIAGNOSIS — F32.A CHRONIC DEPRESSION: Primary | ICD-10-CM

## 2024-08-06 DIAGNOSIS — F41.1 GENERALIZED ANXIETY DISORDER: ICD-10-CM

## 2024-08-06 PROCEDURE — 3066F NEPHROPATHY DOC TX: CPT | Mod: HCNC,CPTII,S$GLB, | Performed by: SOCIAL WORKER

## 2024-08-06 PROCEDURE — 3061F NEG MICROALBUMINURIA REV: CPT | Mod: HCNC,CPTII,S$GLB, | Performed by: SOCIAL WORKER

## 2024-08-06 PROCEDURE — 90834 PSYTX W PT 45 MINUTES: CPT | Mod: HCNC,S$GLB,, | Performed by: SOCIAL WORKER

## 2024-08-06 PROCEDURE — 3072F LOW RISK FOR RETINOPATHY: CPT | Mod: HCNC,CPTII,S$GLB, | Performed by: SOCIAL WORKER

## 2024-08-06 PROCEDURE — 3044F HG A1C LEVEL LT 7.0%: CPT | Mod: HCNC,CPTII,S$GLB, | Performed by: SOCIAL WORKER

## 2024-08-13 ENCOUNTER — OFFICE VISIT (OUTPATIENT)
Dept: PODIATRY | Facility: CLINIC | Age: 64
End: 2024-08-13
Payer: MEDICARE

## 2024-08-13 VITALS — WEIGHT: 172.38 LBS | HEIGHT: 63 IN | BODY MASS INDEX: 30.54 KG/M2

## 2024-08-13 DIAGNOSIS — Z71.6 TOBACCO ABUSE COUNSELING: Primary | ICD-10-CM

## 2024-08-13 DIAGNOSIS — E11.36 TYPE 2 DIABETES MELLITUS WITH DIABETIC CATARACT, WITHOUT LONG-TERM CURRENT USE OF INSULIN: Chronic | ICD-10-CM

## 2024-08-13 PROCEDURE — 3066F NEPHROPATHY DOC TX: CPT | Mod: HCNC,CPTII,S$GLB, | Performed by: PODIATRIST

## 2024-08-13 PROCEDURE — 3072F LOW RISK FOR RETINOPATHY: CPT | Mod: HCNC,CPTII,S$GLB, | Performed by: PODIATRIST

## 2024-08-13 PROCEDURE — 99999 PR PBB SHADOW E&M-EST. PATIENT-LVL IV: CPT | Mod: PBBFAC,HCNC,, | Performed by: PODIATRIST

## 2024-08-13 PROCEDURE — 3044F HG A1C LEVEL LT 7.0%: CPT | Mod: HCNC,CPTII,S$GLB, | Performed by: PODIATRIST

## 2024-08-13 PROCEDURE — 3061F NEG MICROALBUMINURIA REV: CPT | Mod: HCNC,CPTII,S$GLB, | Performed by: PODIATRIST

## 2024-08-13 PROCEDURE — 3008F BODY MASS INDEX DOCD: CPT | Mod: HCNC,CPTII,S$GLB, | Performed by: PODIATRIST

## 2024-08-13 PROCEDURE — 99213 OFFICE O/P EST LOW 20 MIN: CPT | Mod: HCNC,S$GLB,, | Performed by: PODIATRIST

## 2024-08-13 PROCEDURE — 1159F MED LIST DOCD IN RCRD: CPT | Mod: HCNC,CPTII,S$GLB, | Performed by: PODIATRIST

## 2024-08-13 NOTE — PROGRESS NOTES
Podiatry Department    Patient ID: Melita Romero is a 63 y.o. female.    Chief Complaint: Diabetic Foot Exam (DFE, pt rates pain 0/10, pt states sometimes she has pain at the top of her right foot, pt is diabetic, pt last seen pcp Sonya Pak MD 7/29/24/)    History of Present Illness    CHIEF COMPLAINT:  Patient presents today for a diabetic foot exam.    FOOT PAIN:  She reports intermittent pain on the top of her right foot, specifically on the dorsal aspect. She denies experiencing burning, tingling, numbness, or leg cramps in her feet.    MEDICAL HISTORY:  She reports no changes in her medical history over the past year.    SURGICAL HISTORY:  She expresses aversion to surgery.    SOCIAL HISTORY:  She is a current smoker, consuming half a pack per day for the past 20 years. She expresses interest in smoking cessation but has not yet initiated the process.    ROS:  Musculoskeletal: +joint pain  Neurological: -numbness, -tingling            Physical Exam    Skin: Normal.  Musculoskeletal: Bilateral bunion deformities with right worse than left. Calluses plantar aspect of hallux. Digital contractures noted on bilateral feet. Mild pain on the dorsal aspect of the right foot.  Cardiovascular: Palpable pulses on bilateral feet.  Extremities: No edema.  Neurological: Sensation intact in all 10 sites tested with Ramsey-Charles monofilament.           Diagnoses:  1. Tobacco abuse counseling  -     Ambulatory referral/consult to Smoking Cessation Program; Future; Expected date: 08/20/2024    2. Type 2 diabetes mellitus with diabetic cataract, without long-term current use of insulin  Overview:  Chronic, Stable, cont metformin    Orders:  -     Ambulatory referral/consult to Podiatry        Assessment & Plan    - Conducted diabetic foot exam  - Noted bilateral bunion deformities, right worse than left  - Observed palpable pulses on bilateral feet, no edema  - Identified calluses on plantar  aspect of hallux and digital contractures on bilateral feet  - Performed protective sensation test, patient able to feel all 10 sites  - Detected mild, intermittent pain on dorsal aspect of right foot, likely due to arthritis  - Considered conservative management options for foot pain before surgical intervention  DIABETES AND CIRCULATORY ISSUES:  - Emphasized the importance of maintaining circulation, especially with diabetes and smoking history.  - Patient to increase walking to improve blood flow.  FOOT PAIN:  - Discussed the risk of arthritis in the foot and conservative management options.  - Recommend Tylenol Arthritis (OTC) for foot pain as needed.  FOLLOW UP:  - Follow up if foot pain becomes significant and consistent for x-ray and potential steroid injection.            Future Appointments   Date Time Provider Department Center   8/21/2024  2:00 PM Paddy Morgan, [place] [facility] [place]   10/8/2024  8:00 AM Jerardo Gutierrez MD [facility] PSYCH [place]   10/28/2024  2:40 PM Aram Mendoza MD [facility] CARDIO [place]   10/29/2024 10:00 AM Yajaira Taveras NP [facility] IM [place]   4/22/2025 11:15 AM Janet Upton MD [facility] [place]        This note was generated with the assistance of ambient listening technology. Verbal consent was obtained by the patient and accompanying visitor(s) for the recording of patient appointment to facilitate this note. I attest to having reviewed and edited the generated note for accuracy, though some syntax or spelling errors may persist. Please contact the author of this note for any clarification.      Report Electronically Signed By:     Verónica Walker DPM   Podiatry  [facility]  8/13/2024

## 2024-08-21 ENCOUNTER — CLINICAL SUPPORT (OUTPATIENT)
Dept: SMOKING CESSATION | Facility: CLINIC | Age: 64
End: 2024-08-21
Payer: COMMERCIAL

## 2024-08-21 DIAGNOSIS — F17.200 NICOTINE DEPENDENCE: Primary | ICD-10-CM

## 2024-08-21 PROCEDURE — 99404 PREV MED CNSL INDIV APPRX 60: CPT | Mod: S$GLB,,, | Performed by: GENERAL PRACTICE

## 2024-08-21 PROCEDURE — 99999 PR PBB SHADOW E&M-EST. PATIENT-LVL II: CPT | Mod: PBBFAC,,,

## 2024-08-21 RX ORDER — IBUPROFEN 200 MG
1 TABLET ORAL DAILY
Qty: 28 PATCH | Refills: 0 | Status: SHIPPED | OUTPATIENT
Start: 2024-08-21

## 2024-09-19 ENCOUNTER — TELEPHONE (OUTPATIENT)
Dept: SMOKING CESSATION | Facility: CLINIC | Age: 64
End: 2024-09-19
Payer: MEDICARE

## 2024-10-02 ENCOUNTER — TELEPHONE (OUTPATIENT)
Dept: PSYCHIATRY | Facility: CLINIC | Age: 64
End: 2024-10-02
Payer: MEDICARE

## 2024-10-02 RX ORDER — DESVENLAFAXINE 100 MG/1
100 TABLET, EXTENDED RELEASE ORAL DAILY
Qty: 30 TABLET | Refills: 2 | Status: SHIPPED | OUTPATIENT
Start: 2024-10-02 | End: 2025-10-02

## 2024-10-02 RX ORDER — CLONAZEPAM 0.5 MG/1
0.5 TABLET ORAL DAILY PRN
Qty: 30 TABLET | Refills: 1 | Status: SHIPPED | OUTPATIENT
Start: 2024-10-02 | End: 2025-10-02

## 2024-10-02 RX ORDER — TRAZODONE HYDROCHLORIDE 100 MG/1
TABLET ORAL
Qty: 30 TABLET | Refills: 1 | Status: SHIPPED | OUTPATIENT
Start: 2024-10-02

## 2024-10-02 NOTE — TELEPHONE ENCOUNTER
Called pt R/S 10/8 appointment to 10/23 in office with Dr. Jerardo Gutierrez due to book out pt state that she will need RX refill till her appointment

## 2024-10-04 RX ORDER — AMLODIPINE BESYLATE 5 MG/1
5 TABLET ORAL DAILY
Qty: 90 TABLET | Refills: 1 | Status: SHIPPED | OUTPATIENT
Start: 2024-10-04 | End: 2025-10-04

## 2024-10-07 DIAGNOSIS — E11.36 TYPE 2 DIABETES MELLITUS WITH DIABETIC CATARACT, WITHOUT LONG-TERM CURRENT USE OF INSULIN: Chronic | ICD-10-CM

## 2024-10-07 DIAGNOSIS — K21.9 GASTROESOPHAGEAL REFLUX DISEASE, UNSPECIFIED WHETHER ESOPHAGITIS PRESENT: Chronic | ICD-10-CM

## 2024-10-07 DIAGNOSIS — E11.59 HYPERTENSION ASSOCIATED WITH DIABETES: Chronic | ICD-10-CM

## 2024-10-07 DIAGNOSIS — E78.5 HYPERLIPIDEMIA ASSOCIATED WITH TYPE 2 DIABETES MELLITUS: Chronic | ICD-10-CM

## 2024-10-07 DIAGNOSIS — I15.2 HYPERTENSION ASSOCIATED WITH DIABETES: Chronic | ICD-10-CM

## 2024-10-07 DIAGNOSIS — E11.69 HYPERLIPIDEMIA ASSOCIATED WITH TYPE 2 DIABETES MELLITUS: Chronic | ICD-10-CM

## 2024-10-07 RX ORDER — METFORMIN HYDROCHLORIDE 500 MG/1
500 TABLET, EXTENDED RELEASE ORAL 2 TIMES DAILY WITH MEALS
Qty: 180 TABLET | Refills: 1 | Status: SHIPPED | OUTPATIENT
Start: 2024-10-07

## 2024-10-07 RX ORDER — LOSARTAN POTASSIUM AND HYDROCHLOROTHIAZIDE 12.5; 1 MG/1; MG/1
1 TABLET ORAL DAILY
Qty: 90 TABLET | Refills: 3 | Status: SHIPPED | OUTPATIENT
Start: 2024-10-07

## 2024-10-07 NOTE — TELEPHONE ENCOUNTER
No care due was identified.  [facility] Embedded Care Due Messages. Reference number: [id number].   10/07/2024 11:45:04 AM CDT

## 2024-10-07 NOTE — TELEPHONE ENCOUNTER
No care due was identified.  [facility] Embedded Care Due Messages. Reference number: [id number].   10/07/2024 11:43:35 AM CDT

## 2024-10-07 NOTE — TELEPHONE ENCOUNTER
No care due was identified.  [facility] Embedded Care Due Messages. Reference number: [id number].   10/07/2024 11:43:04 AM CDT

## 2024-10-07 NOTE — TELEPHONE ENCOUNTER
No care due was identified.  [facility] Embedded Care Due Messages. Reference number: [id number].   10/07/2024 11:44:25 AM CDT

## 2024-10-07 NOTE — TELEPHONE ENCOUNTER
No care due was identified.  [facility] Embedded Care Due Messages. Reference number: [id number].   10/07/2024 11:42:29 AM CDT

## 2024-10-08 RX ORDER — ATORVASTATIN CALCIUM 40 MG/1
40 TABLET, FILM COATED ORAL DAILY
Qty: 90 TABLET | Refills: 3 | OUTPATIENT
Start: 2024-10-08

## 2024-10-08 RX ORDER — PANTOPRAZOLE SODIUM 40 MG/1
40 TABLET, DELAYED RELEASE ORAL DAILY
Qty: 90 TABLET | Refills: 3 | Status: SHIPPED | OUTPATIENT
Start: 2024-10-08

## 2024-10-08 RX ORDER — TIZANIDINE 4 MG/1
4 TABLET ORAL EVERY 8 HOURS
Qty: 90 TABLET | Refills: 1 | Status: SHIPPED | OUTPATIENT
Start: 2024-10-08

## 2024-10-08 NOTE — TELEPHONE ENCOUNTER
Refill Decision Note   Melita Parkermarry  is requesting a refill authorization.  Brief Assessment and Rationale for Refill:  Quick Discontinue     Medication Therapy Plan: rx was approved 7.29.24 for 90 tablets plus 3 refills      Comments:     Note composed:12:40 AM 10/08/2024

## 2024-10-08 NOTE — TELEPHONE ENCOUNTER
Refill Decision Note   Melita Heather  is requesting a refill authorization.  Brief Assessment and Rationale for Refill:  Approve     Medication Therapy Plan:         Comments:     Note composed:10:43 PM 10/07/2024

## 2024-10-08 NOTE — TELEPHONE ENCOUNTER
Refill Routing Note   Medication(s) are not appropriate for processing by [facility] for the following reason(s):        Outside of protocol    [facility] action(s):  Route               Appointments  past 12m or future 3m with PCP    Date Provider   Last Visit   7/29/2024 Sonya Pak MD   Next Visit   10/7/2024 Sonya Pak MD   ED visits in past 90 days: 0        Note composed:9:07 PM 10/07/2024

## 2024-10-08 NOTE — TELEPHONE ENCOUNTER
Refill Decision Note   Melita Heather  is requesting a refill authorization.  Brief Assessment and Rationale for Refill:  Approve     Medication Therapy Plan:        Comments:     Note composed:12:37 AM 10/08/2024

## 2024-10-08 NOTE — TELEPHONE ENCOUNTER
Refill Decision Note   Melita Heather  is requesting a refill authorization.  Brief Assessment and Rationale for Refill:  Approve     Medication Therapy Plan:         Comments:     Note composed:10:41 PM 10/07/2024

## 2024-10-11 PROBLEM — F33.9 MAJOR DEPRESSION, RECURRENT, CHRONIC: Status: ACTIVE | Noted: 2024-10-11

## 2024-10-11 PROBLEM — N20.0 KIDNEY STONE: Status: ACTIVE | Noted: 2024-10-11

## 2024-10-11 PROBLEM — Z12.11 SPECIAL SCREENING FOR MALIGNANT NEOPLASMS, COLON: Status: RESOLVED | Noted: 2020-01-09 | Resolved: 2024-10-11

## 2024-10-11 PROBLEM — Z28.9 DELAYED IMMUNIZATIONS: Status: RESOLVED | Noted: 2022-06-10 | Resolved: 2024-10-11

## 2024-10-11 PROBLEM — E66.811 CLASS 1 OBESITY: Status: ACTIVE | Noted: 2017-02-27

## 2024-10-21 ENCOUNTER — TELEPHONE (OUTPATIENT)
Dept: PSYCHIATRY | Facility: CLINIC | Age: 64
End: 2024-10-21
Payer: MEDICARE

## 2024-10-29 ENCOUNTER — PATIENT MESSAGE (OUTPATIENT)
Dept: PREADMISSION TESTING | Facility: HOSPITAL | Age: 64
End: 2024-10-29
Payer: MEDICARE

## 2024-10-29 ENCOUNTER — OFFICE VISIT (OUTPATIENT)
Dept: INTERNAL MEDICINE | Facility: CLINIC | Age: 64
End: 2024-10-29
Payer: MEDICARE

## 2024-10-29 VITALS
WEIGHT: 174.63 LBS | DIASTOLIC BLOOD PRESSURE: 90 MMHG | SYSTOLIC BLOOD PRESSURE: 150 MMHG | HEIGHT: 63 IN | HEART RATE: 82 BPM | BODY MASS INDEX: 30.94 KG/M2 | RESPIRATION RATE: 20 BRPM | OXYGEN SATURATION: 96 %

## 2024-10-29 DIAGNOSIS — E11.36 TYPE 2 DIABETES MELLITUS WITH DIABETIC CATARACT, WITHOUT LONG-TERM CURRENT USE OF INSULIN: ICD-10-CM

## 2024-10-29 DIAGNOSIS — E11.69 HYPERLIPIDEMIA ASSOCIATED WITH TYPE 2 DIABETES MELLITUS: ICD-10-CM

## 2024-10-29 DIAGNOSIS — M47.816 LUMBAR SPONDYLOSIS: ICD-10-CM

## 2024-10-29 DIAGNOSIS — E55.9 VITAMIN D DEFICIENCY DISEASE: ICD-10-CM

## 2024-10-29 DIAGNOSIS — F33.9 MAJOR DEPRESSION, RECURRENT, CHRONIC: ICD-10-CM

## 2024-10-29 DIAGNOSIS — Z91.89 NEED FOR DENTAL CARE: ICD-10-CM

## 2024-10-29 DIAGNOSIS — F17.200 TOBACCO DEPENDENCE: ICD-10-CM

## 2024-10-29 DIAGNOSIS — M54.17 LUMBOSACRAL RADICULOPATHY: ICD-10-CM

## 2024-10-29 DIAGNOSIS — G89.4 CHRONIC PAIN SYNDROME: ICD-10-CM

## 2024-10-29 DIAGNOSIS — N20.0 KIDNEY STONE: ICD-10-CM

## 2024-10-29 DIAGNOSIS — J45.20 MILD INTERMITTENT ASTHMA WITHOUT COMPLICATION: ICD-10-CM

## 2024-10-29 DIAGNOSIS — I15.2 HYPERTENSION ASSOCIATED WITH DIABETES: ICD-10-CM

## 2024-10-29 DIAGNOSIS — Z00.00 ENCOUNTER FOR MEDICARE ANNUAL WELLNESS EXAM: Primary | ICD-10-CM

## 2024-10-29 DIAGNOSIS — N39.46 MIXED STRESS AND URGE URINARY INCONTINENCE: ICD-10-CM

## 2024-10-29 DIAGNOSIS — F41.1 GAD (GENERALIZED ANXIETY DISORDER): ICD-10-CM

## 2024-10-29 DIAGNOSIS — E66.811 CLASS 1 OBESITY: ICD-10-CM

## 2024-10-29 DIAGNOSIS — F51.01 PRIMARY INSOMNIA: ICD-10-CM

## 2024-10-29 DIAGNOSIS — E78.5 HYPERLIPIDEMIA ASSOCIATED WITH TYPE 2 DIABETES MELLITUS: ICD-10-CM

## 2024-10-29 DIAGNOSIS — Z12.11 COLON CANCER SCREENING: ICD-10-CM

## 2024-10-29 DIAGNOSIS — I70.0 AORTIC ATHEROSCLEROSIS: ICD-10-CM

## 2024-10-29 DIAGNOSIS — E11.59 HYPERTENSION ASSOCIATED WITH DIABETES: ICD-10-CM

## 2024-10-29 DIAGNOSIS — Z78.0 POSTMENOPAUSAL: ICD-10-CM

## 2024-10-29 DIAGNOSIS — I51.89 LEFT VENTRICULAR DIASTOLIC DYSFUNCTION WITH PRESERVED SYSTOLIC FUNCTION: ICD-10-CM

## 2024-10-29 DIAGNOSIS — E04.2 MULTINODULAR THYROID: ICD-10-CM

## 2024-10-29 PROCEDURE — G0439 PPPS, SUBSEQ VISIT: HCPCS | Mod: HCNC,S$GLB,, | Performed by: NURSE PRACTITIONER

## 2024-10-29 PROCEDURE — 1159F MED LIST DOCD IN RCRD: CPT | Mod: HCNC,CPTII,S$GLB, | Performed by: NURSE PRACTITIONER

## 2024-10-29 PROCEDURE — 3066F NEPHROPATHY DOC TX: CPT | Mod: HCNC,CPTII,S$GLB, | Performed by: NURSE PRACTITIONER

## 2024-10-29 PROCEDURE — 3077F SYST BP >= 140 MM HG: CPT | Mod: HCNC,CPTII,S$GLB, | Performed by: NURSE PRACTITIONER

## 2024-10-29 PROCEDURE — 3061F NEG MICROALBUMINURIA REV: CPT | Mod: HCNC,CPTII,S$GLB, | Performed by: NURSE PRACTITIONER

## 2024-10-29 PROCEDURE — 3080F DIAST BP >= 90 MM HG: CPT | Mod: HCNC,CPTII,S$GLB, | Performed by: NURSE PRACTITIONER

## 2024-10-29 PROCEDURE — 3044F HG A1C LEVEL LT 7.0%: CPT | Mod: HCNC,CPTII,S$GLB, | Performed by: NURSE PRACTITIONER

## 2024-10-29 PROCEDURE — 3072F LOW RISK FOR RETINOPATHY: CPT | Mod: HCNC,CPTII,S$GLB, | Performed by: NURSE PRACTITIONER

## 2024-10-29 PROCEDURE — 1160F RVW MEDS BY RX/DR IN RCRD: CPT | Mod: HCNC,CPTII,S$GLB, | Performed by: NURSE PRACTITIONER

## 2024-10-29 PROCEDURE — 99406 BEHAV CHNG SMOKING 3-10 MIN: CPT | Mod: HCNC,S$GLB,, | Performed by: NURSE PRACTITIONER

## 2024-10-29 PROCEDURE — 99999 PR PBB SHADOW E&M-EST. PATIENT-LVL V: CPT | Mod: PBBFAC,HCNC,, | Performed by: NURSE PRACTITIONER

## 2024-11-13 ENCOUNTER — OFFICE VISIT (OUTPATIENT)
Dept: PSYCHIATRY | Facility: CLINIC | Age: 64
End: 2024-11-13
Payer: MEDICARE

## 2024-11-13 ENCOUNTER — TELEPHONE (OUTPATIENT)
Dept: SMOKING CESSATION | Facility: CLINIC | Age: 64
End: 2024-11-13
Payer: MEDICARE

## 2024-11-13 DIAGNOSIS — F41.1 GENERALIZED ANXIETY DISORDER: Primary | ICD-10-CM

## 2024-11-13 DIAGNOSIS — G47.00 INSOMNIA, UNSPECIFIED TYPE: ICD-10-CM

## 2024-11-13 DIAGNOSIS — F32.A CHRONIC DEPRESSION: ICD-10-CM

## 2024-11-13 PROCEDURE — 99214 OFFICE O/P EST MOD 30 MIN: CPT | Mod: HCNC,95,, | Performed by: PSYCHIATRY & NEUROLOGY

## 2024-11-13 PROCEDURE — 3072F LOW RISK FOR RETINOPATHY: CPT | Mod: HCNC,CPTII,95, | Performed by: PSYCHIATRY & NEUROLOGY

## 2024-11-13 PROCEDURE — 3044F HG A1C LEVEL LT 7.0%: CPT | Mod: HCNC,CPTII,95, | Performed by: PSYCHIATRY & NEUROLOGY

## 2024-11-13 PROCEDURE — 3066F NEPHROPATHY DOC TX: CPT | Mod: HCNC,CPTII,95, | Performed by: PSYCHIATRY & NEUROLOGY

## 2024-11-13 PROCEDURE — 3061F NEG MICROALBUMINURIA REV: CPT | Mod: HCNC,CPTII,95, | Performed by: PSYCHIATRY & NEUROLOGY

## 2024-11-13 RX ORDER — SERTRALINE HYDROCHLORIDE 50 MG/1
50 TABLET, FILM COATED ORAL DAILY
Qty: 30 TABLET | Refills: 3 | Status: SHIPPED | OUTPATIENT
Start: 2024-11-13 | End: 2025-11-13

## 2024-11-13 RX ORDER — CLONAZEPAM 0.5 MG/1
0.5 TABLET ORAL DAILY PRN
Qty: 30 TABLET | Refills: 2 | Status: SHIPPED | OUTPATIENT
Start: 2024-11-13 | End: 2025-11-13

## 2024-11-13 RX ORDER — DESVENLAFAXINE 100 MG/1
100 TABLET, EXTENDED RELEASE ORAL DAILY
Qty: 30 TABLET | Refills: 3 | Status: SHIPPED | OUTPATIENT
Start: 2024-11-13 | End: 2025-11-13
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
## 2024-11-27 ENCOUNTER — TELEPHONE (OUTPATIENT)
Dept: SMOKING CESSATION | Facility: CLINIC | Age: 64
End: 2024-11-27
Payer: MEDICARE

## 2024-12-16 ENCOUNTER — TELEPHONE (OUTPATIENT)
Dept: PULMONOLOGY | Facility: CLINIC | Age: 64
End: 2024-12-16
Payer: MEDICARE

## 2024-12-16 DIAGNOSIS — J45.20 MILD INTERMITTENT ASTHMA WITHOUT COMPLICATION: ICD-10-CM

## 2024-12-16 RX ORDER — FLUTICASONE PROPIONATE AND SALMETEROL 250; 50 UG/1; UG/1
1 POWDER RESPIRATORY (INHALATION) 2 TIMES DAILY
Qty: 60 EACH | Refills: 5 | Status: CANCELLED | OUTPATIENT
Start: 2024-12-16 | End: 2025-12-16

## 2024-12-16 RX ORDER — ALBUTEROL SULFATE 90 UG/1
2 INHALANT RESPIRATORY (INHALATION) EVERY 6 HOURS PRN
Qty: 18 G | Refills: 5 | Status: CANCELLED | OUTPATIENT
Start: 2024-12-16 | End: 2025-12-16

## 2024-12-16 NOTE — TELEPHONE ENCOUNTER
----- Message from Edwina sent at 12/16/2024  1:50 PM CST -----  Contact: self  [phone]  Type:  RX Refill Request    Who Called: GERARD  Refill or New Rx:REFILL  RX Name and Strength:fluticasone-salmeterol diskus inhaler 250-50 mcg  How is the patient currently taking it? (ex. 1XDay)  Is this a 30 day or 90 day RX  Preferred Pharmacy with phone number    [facility] - [place], [place] - [address]  [address]  [place] [address]  Phone: [phone] Fax: [phone]     Local or Mail Order:LOCAL  Ordering Provider:MIDYETT  Would the patient rather a call back or a response via [facility]?   Best Call Back Number: [phone]  Additional Information:     Type:  RX Refill Request    Who Called: GERARD  Refill or New Rx:REFILL  RX Name and Strength:Abuterol (VENTOLIN HFA) 90 mcg/actuation inhaler   How is the patient currently taking it? (ex. 1XDay)  Is this a 30 day or 90 day RX:  Preferred Pharmacy with phone number:  [facility] - [place], [place] - [address]  [address]  [place] [address]  Phone: [phone] Fax: [phone]     Local or Mail Order:LOCAL  Ordering Provider:MIDYETT  Would the patient rather a call back or a response via [facility]? CB  Best Call Back Number:  Additional Information:

## 2025-01-09 ENCOUNTER — HOSPITAL ENCOUNTER (OUTPATIENT)
Dept: PREADMISSION TESTING | Facility: HOSPITAL | Age: 65
Discharge: HOME | End: 2025-01-09
Attending: FAMILY MEDICINE
Payer: MEDICARE

## 2025-01-09 DIAGNOSIS — Z12.11 COLON CANCER SCREENING: ICD-10-CM

## 2025-01-29 DIAGNOSIS — E11.9 TYPE 2 DIABETES MELLITUS WITHOUT COMPLICATION: ICD-10-CM

## 2025-02-04 ENCOUNTER — OFFICE VISIT (OUTPATIENT)
Dept: PULMONOLOGY | Facility: CLINIC | Age: 65
End: 2025-02-04
Attending: HOSPITALIST
Payer: MEDICARE

## 2025-02-04 ENCOUNTER — HOSPITAL ENCOUNTER (OUTPATIENT)
Dept: RADIOLOGY | Facility: HOSPITAL | Age: 65
Discharge: HOME | End: 2025-02-04
Attending: HOSPITALIST
Payer: MEDICARE

## 2025-02-04 VITALS
HEART RATE: 85 BPM | BODY MASS INDEX: 31.13 KG/M2 | SYSTOLIC BLOOD PRESSURE: 130 MMHG | HEIGHT: 63 IN | RESPIRATION RATE: 17 BRPM | OXYGEN SATURATION: 95 % | DIASTOLIC BLOOD PRESSURE: 90 MMHG | WEIGHT: 175.69 LBS

## 2025-02-04 DIAGNOSIS — E66.01 MORBID OBESITY: ICD-10-CM

## 2025-02-04 DIAGNOSIS — J45.20 MILD INTERMITTENT ASTHMA WITHOUT COMPLICATION: ICD-10-CM

## 2025-02-04 DIAGNOSIS — F17.210 NICOTINE DEPENDENCE, CIGARETTES, UNCOMPLICATED: Primary | ICD-10-CM

## 2025-02-04 DIAGNOSIS — Z23 NEED FOR VACCINATION: ICD-10-CM

## 2025-02-04 DIAGNOSIS — F17.210 NICOTINE DEPENDENCE, CIGARETTES, UNCOMPLICATED: ICD-10-CM

## 2025-02-04 PROCEDURE — 71271 CT THORAX LUNG CANCER SCR C-: CPT | Mod: TC,HCNC

## 2025-02-04 PROCEDURE — 3008F BODY MASS INDEX DOCD: CPT | Mod: HCNC,CPTII,S$GLB, | Performed by: HOSPITALIST

## 2025-02-04 PROCEDURE — G0008 ADMIN INFLUENZA VIRUS VAC: HCPCS | Mod: HCNC,S$GLB,, | Performed by: HOSPITALIST

## 2025-02-04 PROCEDURE — 71271 CT THORAX LUNG CANCER SCR C-: CPT | Mod: 26,HCNC,, | Performed by: STUDENT IN AN ORGANIZED HEALTH CARE EDUCATION/TRAINING PROGRAM

## 2025-02-04 PROCEDURE — 99999 PR PBB SHADOW E&M-EST. PATIENT-LVL V: CPT | Mod: PBBFAC,HCNC,, | Performed by: HOSPITALIST

## 2025-02-04 PROCEDURE — 3075F SYST BP GE 130 - 139MM HG: CPT | Mod: HCNC,CPTII,S$GLB, | Performed by: HOSPITALIST

## 2025-02-04 PROCEDURE — 90656 IIV3 VACC NO PRSV 0.5 ML IM: CPT | Mod: HCNC,S$GLB,, | Performed by: HOSPITALIST

## 2025-02-04 PROCEDURE — 1160F RVW MEDS BY RX/DR IN RCRD: CPT | Mod: HCNC,CPTII,S$GLB, | Performed by: HOSPITALIST

## 2025-02-04 PROCEDURE — 99214 OFFICE O/P EST MOD 30 MIN: CPT | Mod: HCNC,S$GLB,, | Performed by: HOSPITALIST

## 2025-02-04 PROCEDURE — 1159F MED LIST DOCD IN RCRD: CPT | Mod: HCNC,CPTII,S$GLB, | Performed by: HOSPITALIST

## 2025-02-04 PROCEDURE — 3080F DIAST BP >= 90 MM HG: CPT | Mod: HCNC,CPTII,S$GLB, | Performed by: HOSPITALIST

## 2025-02-04 RX ORDER — ALBUTEROL SULFATE 0.83 MG/ML
2.5 SOLUTION RESPIRATORY (INHALATION) EVERY 6 HOURS PRN
Qty: 180 ML | Refills: 3 | Status: SHIPPED | OUTPATIENT
Start: 2025-02-04 | End: 2026-02-04

## 2025-02-04 RX ORDER — ALBUTEROL SULFATE 90 UG/1
2 INHALANT RESPIRATORY (INHALATION) EVERY 6 HOURS PRN
Qty: 18 G | Refills: 5 | Status: SHIPPED | OUTPATIENT
Start: 2025-02-04 | End: 2026-02-04

## 2025-02-04 RX ORDER — FLUTICASONE PROPIONATE AND SALMETEROL 250; 50 UG/1; UG/1
1 POWDER RESPIRATORY (INHALATION) 2 TIMES DAILY
Qty: 60 EACH | Refills: 11 | Status: SHIPPED | OUTPATIENT
Start: 2025-02-04 | End: 2026-02-04

## 2025-02-04 NOTE — PROGRESS NOTES
Subjective:      Patient ID: Melita Romero is a 64 y.o. female.    Chief Complaint: Tobacco Use, Asthma    Interval Hx 2/4/25:    Mrs. Romero presents today for follow up tobacco use. She was last seen 11/2023- started on Advair and Albuterol.     Did not get flu or RSV vaccine. Having some nasal congestion, no fever. Has noticed a little more chest tightness lately- ran out of Advair about a month ago and could tell the difference.     Interim Testing:  - LDCT 2/4/25- Lung-rads 2    Pulmonary Interventions:  - Albuterol hfa- using daily since running out of advair  - Advair 250mcg- out of for about a month    Smoking: half a pack    Interval Hx 11/14/23:     Mrs. Romero presents for follow up tobacco use. She was seen for the first and most recent time 10/10/23- reported mild resp symptoms when she increased her cigarette volume. Further work up was ordered in the way of PFT, walk, and updated LDCT.      Mrs. Romero reports that symptoms have been about the same. She has not yet decided to stop smoking, but it is heavy on her mind. She reports that she could tell an improvement in her breathing after the albuterol treatment.     Interim Work Up:  LDCT- Lung-rads 1, no emphysema changes, RML atelectasis  PFT- Normal spirometry with significant bronchodilator response (15.7%, .25L), normal lung volumes, normal diffusion, mild obstruction with flow loop  6MW- Resting SpO2 98%, Range 90-97%, walked 464m, 97% predicted, no pauses     Pulmonary Interventions:   No inhalers     HPI 10/10/23:     62 year old female with history of depression, HLD, GERD, HTN, tobacco use, DM2, who presents to Pulmonary clinic to establish care given her continued tobacco use.  She was seen for the first and last time in clinic 7/2020 by Dr. Loo, at that visit  a PSG was ordered. This was negative for sleep apnea.     Mrs. Romero reports no significant coughing, dyspnea, or wheezing when she is not smoking. She notices  "worsening of these symptoms though when she smokes more than a half pack per day. She is motivated to quit and is planning to contact the smoking cessation program.      Pertinent Work Up:  LDCT 2020- "There is a subpleural nodular opacity in the right lobe lower lobe medially which appears solid and measures 0.4 cm on series 4, image 291.  No nodular opacities identified in the left lung.  The lungs show no findings consistent with emphysema.  Mild scattered parenchymal scarring appears unchanged from prior, remaining most severe in the right middle lobe."     Pulmonary Interventions:   No inhalers     Smoking hx: half pack per day, has quit intermittently in the past, around 20 pack years  Environmental hx: None known, worked in a grocery store    Review of Systems   HENT:  Positive for postnasal drip and congestion.    Respiratory:  Positive for cough, chest tightness and wheezing. Negative for sputum production.      Objective:     Physical Exam   Constitutional: She is oriented to person, place, and time. She appears well-developed and well-nourished. She is obese.   Cardiovascular: Normal rate and regular rhythm.   Pulmonary/Chest:   Mild exp wheeze at bilateral bases, normal effort at rest on room air with conversation   Neurological: She is alert and oriented to person, place, and time.     Personal Diagnostic Review  As Above      10/29/2024     9:54 AM 8/13/2024     9:47 AM 7/29/2024     8:55 AM 7/15/2024    10:56 AM 4/23/2024    11:05 AM 4/10/2024     9:54 AM 11/14/2023     2:50 PM   Pulmonary Function Tests   SpO2 96 %  97 % 96 %  95 % 98 %   Height 5' 3" (1.6 m) 5' 3" (1.6 m) 5' 3" (1.6 m) 5' 3" (1.6 m) 5' 3" (1.6 m)  5' 3" (1.6 m)   Weight 79.2 kg (174 lb 9.7 oz) 78.2 kg (172 lb 6.4 oz) 78.2 kg (172 lb 6.4 oz) 77.5 kg (170 lb 13.7 oz) 76.5 kg (168 lb 10.4 oz) 79.4 kg (175 lb 0.7 oz) 79.7 kg (175 lb 11.3 oz)   BMI (Calculated) 30.9 30.5 30.5 30.3 29.9  31.1        Assessment:     No diagnosis found.   "   Outpatient Encounter Medications as of 2/4/2025   Medication Sig Dispense Refill    albuterol (VENTOLIN HFA) 90 mcg/actuation inhaler Inhale 2 puffs into the lungs every 6 (six) hours as needed for Wheezing. Rescue 18 g 5    amLODIPine (NORVASC) 5 MG tablet Take 1 tablet (5 mg total) by mouth once daily. 90 tablet 1    aspirin 81 mg Tab Take 1 tablet by mouth Daily.      atorvastatin (LIPITOR) 40 MG tablet Take 1 tablet (40 mg total) by mouth once daily. 90 tablet 3    cholecalciferol, vitamin D3, 2,000 unit Tab Take 1 tablet by mouth Daily.      clonazePAM (KLONOPIN) 0.5 MG tablet Take 1 tablet (0.5 mg total) by mouth daily as needed for Anxiety. 30 tablet 2    desvenlafaxine succinate (PRISTIQ) 100 MG Tb24 Take 1 tablet (100 mg total) by mouth once daily. 30 tablet 3    fluticasone-salmeterol diskus inhaler 250-50 mcg Inhale 1 puff into the lungs 2 (two) times daily. Controller 60 each 5    hydrocodone-acetaminophen 10-325mg (NORCO)  mg Tab Take 1 tablet by mouth every 8 (eight) hours as needed. 75 tablet 0    losartan-hydrochlorothiazide 100-12.5 mg (HYZAAR) 100-12.5 mg Tab Take 1 tablet by mouth once daily. 90 tablet 3    metFORMIN (GLUCOPHAGE-XR) 500 MG ER 24hr tablet Take 1 tablet (500 mg total) by mouth 2 (two) times daily with meals. 180 tablet 1    multivitamin-Ca-iron-minerals 18-0.4 mg Tab Take 1 tablet by mouth Daily.      nicotine (NICODERM CQ) 21 mg/24 hr Place 1 patch onto the skin once daily. 28 patch 0    oxybutynin (DITROPAN-XL) 10 MG 24 hr tablet Take 1 tablet (10 mg total) by mouth once daily. 90 tablet 4    pantoprazole (PROTONIX) 40 MG tablet Take 1 tablet (40 mg total) by mouth once daily. 90 tablet 3    sertraline (ZOLOFT) 50 MG tablet Take 1 tablet (50 mg total) by mouth once daily. 30 tablet 3    tiZANidine (ZANAFLEX) 4 MG tablet Take 1 tablet (4 mg total) by mouth every 8 (eight) hours. 90 tablet 1    traZODone (DESYREL) 100 MG tablet Take 1/2 to 1 tablet at bedtime as needed for  sleep. 30 tablet 1    WOMEN'S DAILY FORMULA 18 mg iron-400 mcg-500 mg Ca Tab        No facility-administered encounter medications on file as of 2/4/2025.     No orders of the defined types were placed in this encounter.      Plan:     Problem List Items Addressed This Visit       Nicotine dependence, cigarettes, uncomplicated - Primary     - ldct 2/25- lung-rads 2  - continue annual screening  - current, half pack per day, previously enrolled in smoking cessation         Relevant Orders    CT Chest Lung Screening Low Dose    Mild intermittent asthma without complication     - restart advair  - albuterol as needed  - prescribing nebulizer  - follow up with jayy, feno, and walk         Relevant Medications    albuterol (VENTOLIN HFA) 90 mcg/actuation inhaler    fluticasone-salmeterol diskus inhaler 250-50 mcg    albuterol (PROVENTIL) 2.5 mg /3 mL (0.083 %) nebulizer solution    Other Relevant Orders    NEBULIZER KIT (SUPPLIES) FOR HOME USE    NEBULIZER FOR HOME USE    Stress test, pulmonary    Spirometry with/without bronchodilator    Morbid obesity     Other Visit Diagnoses       Need for vaccination        Relevant Orders    Influenza - Trivalent - PF (ADULT) (Completed)          Follow up in one year or sooner as needed. Flu shot today.

## 2025-02-04 NOTE — ASSESSMENT & PLAN NOTE
- ldct 2/25- lung-rads 2  - continue annual screening  - current, half pack per day, previously enrolled in smoking cessation

## 2025-02-04 NOTE — ASSESSMENT & PLAN NOTE
- restart advair  - albuterol as needed  - prescribing nebulizer  - follow up with jayy, feno, and walk

## 2025-02-10 RX ORDER — PREGABALIN 50 MG/1
50 CAPSULE ORAL 2 TIMES DAILY
COMMUNITY
Start: 2025-02-05

## 2025-02-10 RX ORDER — TIZANIDINE 4 MG/1
4 TABLET ORAL EVERY 8 HOURS
Qty: 90 TABLET | Refills: 0 | Status: SHIPPED | OUTPATIENT
Start: 2025-02-10

## 2025-02-10 NOTE — TELEPHONE ENCOUNTER
Care Due:                  Date            Visit Type   Department     Provider  --------------------------------------------------------------------------------                                             [facility] INTERNAL  Sonya  Last Visit: 07-      [facility]          DANE Pak  Next Visit: None Scheduled  None         None Found                                                            Last  Test          Frequency    Reason                     Performed    Due Date  --------------------------------------------------------------------------------    HBA1C.......  6 months...  metFORMIN................  07- 01-    [facility] Embedded Care Due Messages. Reference number: [id number].   2/10/2025 1:58:00 PM CST

## 2025-02-12 ENCOUNTER — CLINICAL SUPPORT (OUTPATIENT)
Dept: SMOKING CESSATION | Facility: CLINIC | Age: 65
End: 2025-02-12
Payer: COMMERCIAL

## 2025-02-12 DIAGNOSIS — F17.200 NICOTINE DEPENDENCE: Primary | ICD-10-CM

## 2025-02-12 PROCEDURE — 99999 PR PBB SHADOW E&M-EST. PATIENT-LVL I: CPT | Mod: PBBFAC,,,

## 2025-02-12 PROCEDURE — 99407 BEHAV CHNG SMOKING > 10 MIN: CPT | Mod: S$GLB,,, | Performed by: GENERAL PRACTICE

## 2025-02-12 NOTE — PROGRESS NOTES
Spoke with patient today in regard to smoking cessation progress for 6 month telephone follow up. Patient states that she is not tobacco free at this time. Scheduled appointment for patient to return for Quit #4.  Informed patient of benefit period, future follow ups, and contact information if any further help or support is needed. Will complete smart form for 3/6 month follow up on Quit attempt #3.

## 2025-02-13 DIAGNOSIS — I15.2 HYPERTENSION ASSOCIATED WITH DIABETES: Primary | ICD-10-CM

## 2025-02-13 DIAGNOSIS — I51.89 LEFT VENTRICULAR DIASTOLIC DYSFUNCTION WITH PRESERVED SYSTOLIC FUNCTION: ICD-10-CM

## 2025-02-13 DIAGNOSIS — E11.59 HYPERTENSION ASSOCIATED WITH DIABETES: Primary | ICD-10-CM

## 2025-02-14 ENCOUNTER — OFFICE VISIT (OUTPATIENT)
Dept: INTERNAL MEDICINE | Facility: CLINIC | Age: 65
End: 2025-02-14
Payer: MEDICARE

## 2025-02-14 VITALS
HEIGHT: 63 IN | OXYGEN SATURATION: 95 % | DIASTOLIC BLOOD PRESSURE: 88 MMHG | RESPIRATION RATE: 16 BRPM | WEIGHT: 178.13 LBS | TEMPERATURE: 98 F | BODY MASS INDEX: 31.56 KG/M2 | HEART RATE: 106 BPM | SYSTOLIC BLOOD PRESSURE: 132 MMHG

## 2025-02-14 DIAGNOSIS — L03.012 CELLULITIS OF LEFT MIDDLE FINGER: Primary | ICD-10-CM

## 2025-02-14 DIAGNOSIS — S61.213A LACERATION OF LEFT MIDDLE FINGER WITHOUT FOREIGN BODY WITHOUT DAMAGE TO NAIL, INITIAL ENCOUNTER: ICD-10-CM

## 2025-02-14 DIAGNOSIS — M79.645 PAIN OF FINGER OF LEFT HAND: ICD-10-CM

## 2025-02-14 DIAGNOSIS — M79.89 SWELLING OF FINGER OF LEFT HAND: ICD-10-CM

## 2025-02-14 PROCEDURE — 99999 PR PBB SHADOW E&M-EST. PATIENT-LVL V: CPT | Mod: PBBFAC,HCNC,,

## 2025-02-14 RX ORDER — SULFAMETHOXAZOLE AND TRIMETHOPRIM 800; 160 MG/1; MG/1
1 TABLET ORAL 2 TIMES DAILY
Qty: 20 TABLET | Refills: 0 | Status: SHIPPED | OUTPATIENT
Start: 2025-02-14 | End: 2025-02-24

## 2025-02-14 NOTE — PROGRESS NOTES
"Subjective:       Patient ID: Melita Romero is a 64 y.o. female.    Chief Complaint: Joint Swelling (W/ Redness & Pain, Lt. Ring Finger)    History of Present Illness    CHIEF COMPLAINT:  Patient presents today with left middle finger swelling and pain.    HISTORY OF PRESENT ILLNESS:  She sustained a left middle finger injury on 2/9 while cleaning a  saw at work consisting of a nick from the blade to the distal phalanx. Pt did not think much of it and did yard work the following day involving handling trees and limbs. She developed progressive swelling and tightness yesterday on 2/13, with throbbing pain affecting her sleep. This morning, she noticed purplish discoloration and increased swelling of the entire top of the finger, with swelling spreading down the finger since last night. She attempted home treatment by soaking the finger in epsom salt and hot water.    MEDICATIONS:  She is taking ibuprofen for pain management and has Lortab at home prescribed for other conditions.    ALLERGIES:  She denies allergies to antibiotics.    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           /88 (BP Location: Left arm, Patient Position: Sitting)   Pulse 106   Temp 97.9 °F (36.6 °C) (Tympanic)   Resp 16   Ht 5' 3" (1.6 m)   Wt 80.8 kg (178 lb 2.1 oz)   SpO2 95%   BMI 31.55 kg/m²     Review of Systems   Constitutional:  Negative for chills, fever and unexpected weight change.   HENT:  Negative for hearing loss, rhinorrhea and trouble swallowing.    Eyes:  Negative for discharge and visual disturbance.   Respiratory:  Negative for chest tightness, shortness of breath and wheezing.    Cardiovascular:  Negative for chest pain, palpitations and leg swelling.   Gastrointestinal:  Negative for blood in stool, constipation, diarrhea, nausea and vomiting.   Endocrine: Negative for polydipsia and polyuria.   Genitourinary:  Negative for difficulty urinating, " dysuria, hematuria and menstrual problem.   Musculoskeletal:  Negative for arthralgias, joint swelling and neck pain.   Skin:  Positive for color change and wound.   Neurological:  Negative for weakness and headaches.   Psychiatric/Behavioral:  Negative for confusion.    All other systems reviewed and are negative.      Objective:      Physical Exam  Vitals reviewed.   Constitutional:       General: She is not in acute distress.     Appearance: Normal appearance. She is not ill-appearing or toxic-appearing.   HENT:      Head: Normocephalic and atraumatic.   Eyes:      Pupils: Pupils are equal, round, and reactive to light.   Cardiovascular:      Rate and Rhythm: Normal rate and regular rhythm.   Pulmonary:      Effort: Pulmonary effort is normal.      Breath sounds: Normal breath sounds.   Abdominal:      General: Bowel sounds are normal.      Palpations: Abdomen is soft.   Musculoskeletal:         General: Swelling, tenderness and signs of injury present.      Left hand: Swelling, laceration and tenderness present. Decreased range of motion.        Arms:       Cervical back: Normal range of motion and neck supple.      Comments: Left middle finger swollen, red, decreased ROM, small laceration noted near tip of finger on distal phalanx on palm side.  See photos.   Skin:     General: Skin is warm and dry.      Findings: Erythema and laceration present.   Neurological:      General: No focal deficit present.      Mental Status: She is alert and oriented to person, place, and time.   Psychiatric:         Mood and Affect: Mood normal.         Behavior: Behavior normal.         Thought Content: Thought content normal.         Judgment: Judgment normal.               Assessment:       1. Cellulitis of left middle finger    2. Laceration of left middle finger without foreign body without damage to nail, initial encounter    3. Pain of finger of left hand    4. Swelling of finger of left hand        Plan:   Diagnoses and  all orders for this visit:    Cellulitis of left middle finger  -     sulfamethoxazole-trimethoprim 800-160mg (BACTRIM DS) 800-160 mg Tab; Take 1 tablet by mouth 2 (two) times daily. for 10 days    Laceration of middle finger    Pain of finger of left hand    Swelling of finger of left hand    Assessment & Plan    IMPRESSION:  - Assessed left middle finger injury, likely cellulitis from occupational exposure at [employer]  - Considered potential for worsening infection due to progression of swelling and discoloration  - Opted for oral antibiotic treatment with Bactrim, suitable for patient with no known antibiotic allergies    PAIN:  - Continued current pain management with ibuprofen as needed.  - Advised patient to use Lortab or ibuprofen for pain control.    FINGER INFECTION:  - Evaluated patient's left middle finger presenting with swelling, tightness, and throbbing pain that started yesterday, worsened overnight, and darkened this morning.  - Observed swelling and limited finger mobility.  - Prescribed Bactrim antibiotic twice daily for 10 days.  - Recommend rest, ice application, and elevation of the affected finger above heart level.  - Emphasized completing the full antibiotic course and monitoring for signs of worsening infection.    FOLLOW UP:  Follow up in about 3 months (around 5/14/2025), or if symptoms worsen or fail to improve, for annual exam.

## 2025-02-21 DIAGNOSIS — Z00.00 ENCOUNTER FOR MEDICARE ANNUAL WELLNESS EXAM: ICD-10-CM

## 2025-03-04 ENCOUNTER — PATIENT MESSAGE (OUTPATIENT)
Dept: PSYCHIATRY | Facility: CLINIC | Age: 65
End: 2025-03-04
Payer: MEDICARE

## 2025-03-10 ENCOUNTER — HOSPITAL ENCOUNTER (OUTPATIENT)
Dept: CARDIOLOGY | Facility: HOSPITAL | Age: 65
Discharge: HOME | End: 2025-03-10
Attending: INTERNAL MEDICINE
Payer: MEDICARE

## 2025-03-10 ENCOUNTER — OFFICE VISIT (OUTPATIENT)
Dept: CARDIOLOGY | Facility: CLINIC | Age: 65
End: 2025-03-10
Payer: MEDICARE

## 2025-03-10 VITALS
SYSTOLIC BLOOD PRESSURE: 122 MMHG | WEIGHT: 179.88 LBS | HEART RATE: 81 BPM | BODY MASS INDEX: 31.87 KG/M2 | OXYGEN SATURATION: 95 % | DIASTOLIC BLOOD PRESSURE: 80 MMHG

## 2025-03-10 DIAGNOSIS — E78.5 HYPERLIPIDEMIA ASSOCIATED WITH TYPE 2 DIABETES MELLITUS: ICD-10-CM

## 2025-03-10 DIAGNOSIS — I70.0 AORTIC ATHEROSCLEROSIS: ICD-10-CM

## 2025-03-10 DIAGNOSIS — E11.69 HYPERLIPIDEMIA ASSOCIATED WITH TYPE 2 DIABETES MELLITUS: ICD-10-CM

## 2025-03-10 DIAGNOSIS — R07.89 OTHER CHEST PAIN: ICD-10-CM

## 2025-03-10 DIAGNOSIS — E11.59 HYPERTENSION ASSOCIATED WITH DIABETES: Primary | ICD-10-CM

## 2025-03-10 DIAGNOSIS — I51.89 LEFT VENTRICULAR DIASTOLIC DYSFUNCTION WITH PRESERVED SYSTOLIC FUNCTION: ICD-10-CM

## 2025-03-10 DIAGNOSIS — Z72.0 TOBACCO USE: ICD-10-CM

## 2025-03-10 DIAGNOSIS — E11.36 TYPE 2 DIABETES MELLITUS WITH DIABETIC CATARACT, WITHOUT LONG-TERM CURRENT USE OF INSULIN: Chronic | ICD-10-CM

## 2025-03-10 DIAGNOSIS — I15.2 HYPERTENSION ASSOCIATED WITH DIABETES: ICD-10-CM

## 2025-03-10 DIAGNOSIS — F17.200 TOBACCO DEPENDENCE: ICD-10-CM

## 2025-03-10 DIAGNOSIS — E11.69 HYPERLIPIDEMIA ASSOCIATED WITH TYPE 2 DIABETES MELLITUS: Chronic | ICD-10-CM

## 2025-03-10 DIAGNOSIS — F41.1 GAD (GENERALIZED ANXIETY DISORDER): ICD-10-CM

## 2025-03-10 DIAGNOSIS — I15.2 HYPERTENSION ASSOCIATED WITH DIABETES: Primary | ICD-10-CM

## 2025-03-10 DIAGNOSIS — R06.09 DOE (DYSPNEA ON EXERTION): ICD-10-CM

## 2025-03-10 DIAGNOSIS — E78.5 HYPERLIPIDEMIA ASSOCIATED WITH TYPE 2 DIABETES MELLITUS: Chronic | ICD-10-CM

## 2025-03-10 DIAGNOSIS — E11.59 HYPERTENSION ASSOCIATED WITH DIABETES: ICD-10-CM

## 2025-03-10 PROCEDURE — 3079F DIAST BP 80-89 MM HG: CPT | Mod: HCNC,CPTII,S$GLB, | Performed by: INTERNAL MEDICINE

## 2025-03-10 PROCEDURE — 93010 ELECTROCARDIOGRAM REPORT: CPT | Mod: HCNC,,, | Performed by: INTERNAL MEDICINE

## 2025-03-10 PROCEDURE — 3008F BODY MASS INDEX DOCD: CPT | Mod: HCNC,CPTII,S$GLB, | Performed by: INTERNAL MEDICINE

## 2025-03-10 PROCEDURE — 99999 PR PBB SHADOW E&M-EST. PATIENT-LVL III: CPT | Mod: PBBFAC,HCNC,, | Performed by: INTERNAL MEDICINE

## 2025-03-10 PROCEDURE — 99214 OFFICE O/P EST MOD 30 MIN: CPT | Mod: HCNC,S$GLB,, | Performed by: INTERNAL MEDICINE

## 2025-03-10 PROCEDURE — 93005 ELECTROCARDIOGRAM TRACING: CPT | Mod: HCNC,PO

## 2025-03-10 PROCEDURE — 3074F SYST BP LT 130 MM HG: CPT | Mod: HCNC,CPTII,S$GLB, | Performed by: INTERNAL MEDICINE

## 2025-03-10 PROCEDURE — 1160F RVW MEDS BY RX/DR IN RCRD: CPT | Mod: HCNC,CPTII,S$GLB, | Performed by: INTERNAL MEDICINE

## 2025-03-10 PROCEDURE — G2211 COMPLEX E/M VISIT ADD ON: HCPCS | Mod: HCNC,S$GLB,, | Performed by: INTERNAL MEDICINE

## 2025-03-10 PROCEDURE — 1159F MED LIST DOCD IN RCRD: CPT | Mod: HCNC,CPTII,S$GLB, | Performed by: INTERNAL MEDICINE

## 2025-03-10 RX ORDER — ATORVASTATIN CALCIUM 40 MG/1
40 TABLET, FILM COATED ORAL DAILY
Qty: 90 TABLET | Refills: 3 | Status: SHIPPED | OUTPATIENT
Start: 2025-03-10

## 2025-03-10 RX ORDER — LOSARTAN POTASSIUM AND HYDROCHLOROTHIAZIDE 12.5; 1 MG/1; MG/1
1 TABLET ORAL DAILY
Qty: 90 TABLET | Refills: 3 | Status: SHIPPED | OUTPATIENT
Start: 2025-03-10

## 2025-03-10 RX ORDER — AMLODIPINE BESYLATE 5 MG/1
5 TABLET ORAL DAILY
Qty: 90 TABLET | Refills: 1 | Status: SHIPPED | OUTPATIENT
Start: 2025-03-10 | End: 2026-03-10

## 2025-03-10 NOTE — PROGRESS NOTES
Subjective:   Patient ID:  Melita Romero is a 64 y.o. female who presents for cardiac consult of No chief complaint on file.    Referring Physician: Teofilo Morton MD   [id number] [place] [address]    Reason for consult: CHF, HTN      HPI  The patient came in today for cardiac consult of No chief complaint on file.      Melita Romero is a 64 y.o. female pt with HTN, diastolic dysfunction, aortic atherosc, HLD, obesity, NALLELY, GERD, tobacco abuse presents for follow up CV eval.     7/15/24  BP mildly elevated. HR 70s. BMI 30 - 170 lbs   She is losing weight. She is still smoking wants to quit.   ECG  - NSR, LAD       3/10/25  BP and HR stable. BMI 31 - 179 lbs   Overall stable.   ECG - NSR, LAD, poor RWP  May quit smoking this year.   Has more stress lately.     CONCLUSIONS     1 - Concentric hypertrophy.     2 - Normal left ventricular systolic function (EF 60-65%).     3 - Left ventricular diastolic dysfunction.     4 - Normal right ventricular systolic function .     This document has been electronically    SIGNED BY: Brian Joyner MD On: 04/16/2014 08:02    Past Medical History:   Diagnosis Date    Abnormal glucose     Anxiety     Arthritis     Back pain     Controlled type 2 diabetes mellitus without complication, without long-term current use of insulin 02/27/2017    DDD (degenerative disc disease), lumbar     Depression     Diverticulosis of colon 01/09/2020    Fibromyalgia     Flank pain 09/13/2021    History of colon polyps 11/06/2019    Hyperlipidemia     Hypertension     Mild intermittent asthma without complication 11/14/2023    Thyroid disease     Tobacco dependence     Trouble in sleeping     Urinary incontinence     wears pads daily       Past Surgical History:   Procedure Laterality Date    COLONOSCOPY N/A 01/09/2020    Procedure: COLONOSCOPY;  Surgeon: Jacob Lauren MD;  [facility];  Service: Endoscopy;  Laterality: N/A;    HYSTERECTOMY  2003    complete    OOPHORECTOMY  Bilateral 2003    SHOULDER ARTHROSCOPY Right 2017    Dr. Nima Coyne    TONSILLECTOMY         Social History     Tobacco Use    Smoking status: Every Day     Current packs/day: 0.00     Average packs/day: 0.5 packs/day for 25.7 years (12.9 ttl pk-yrs)     Types: Cigarettes     Start date: 10/29/1994     Last attempt to quit: 2020     Years since quittin.6     Passive exposure: Current    Smokeless tobacco: Never   Substance Use Topics    Alcohol use: Yes     Alcohol/week: 2.0 standard drinks of alcohol     Comment: rarely glass of wine    Drug use: No       Family History   Problem Relation Name Age of Onset    Glaucoma Mother Crystal     Diabetes Mother Crystal     Arthritis Mother Crystal     Cancer Mother Crystal     Cancer Father O     Hypertension Brother K     Diabetes Brother K     Arthritis Brother K     Heart disease Maternal Uncle V     Arthritis Son Anthony     COPD Paternal Aunt Bambi        Patient's Medications   New Prescriptions    No medications on file   Previous Medications    ALBUTEROL (PROVENTIL) 2.5 MG /3 ML (0.083 %) NEBULIZER SOLUTION    Take 3 mLs (2.5 mg total) by nebulization every 6 (six) hours as needed for Wheezing. Rescue    ALBUTEROL (VENTOLIN HFA) 90 MCG/ACTUATION INHALER    Inhale 2 puffs into the lungs every 6 (six) hours as needed for Wheezing. Rescue    AMLODIPINE (NORVASC) 5 MG TABLET    Take 1 tablet (5 mg total) by mouth once daily.    ASPIRIN 81 MG TAB    Take 1 tablet by mouth Daily.    ATORVASTATIN (LIPITOR) 40 MG TABLET    Take 1 tablet (40 mg total) by mouth once daily.    CHOLECALCIFEROL, VITAMIN D3, 2,000 UNIT TAB    Take 1 tablet by mouth Daily.    CLONAZEPAM (KLONOPIN) 0.5 MG TABLET    Take 1 tablet (0.5 mg total) by mouth daily as needed for Anxiety.    DESVENLAFAXINE SUCCINATE (PRISTIQ) 100 MG TB24    Take 1 tablet (100 mg total) by mouth once daily.    FLUTICASONE-SALMETEROL DISKUS INHALER 250-50 MCG    Inhale 1 puff into the lungs 2 (two) times daily. Controller     HYDROCODONE-ACETAMINOPHEN 10-325MG (NORCO)  MG TAB    Take 1 tablet by mouth every 8 (eight) hours as needed.    LOSARTAN-HYDROCHLOROTHIAZIDE 100-12.5 MG (HYZAAR) 100-12.5 MG TAB    Take 1 tablet by mouth once daily.    METFORMIN (GLUCOPHAGE-XR) 500 MG ER 24HR TABLET    Take 1 tablet (500 mg total) by mouth 2 (two) times daily with meals.    MULTIVITAMIN-CA-IRON-MINERALS 18-0.4 MG TAB    Take 1 tablet by mouth Daily.    OXYBUTYNIN (DITROPAN-XL) 10 MG 24 HR TABLET    Take 1 tablet (10 mg total) by mouth once daily.    PANTOPRAZOLE (PROTONIX) 40 MG TABLET    Take 1 tablet (40 mg total) by mouth once daily.    PREGABALIN (LYRICA) 50 MG CAPSULE    Take 50 mg by mouth 2 (two) times daily.    SERTRALINE (ZOLOFT) 50 MG TABLET    Take 1 tablet (50 mg total) by mouth once daily.    TIZANIDINE (ZANAFLEX) 4 MG TABLET    TAKE 1 TABLET (4 MG TOTAL) BY MOUTH EVERY 8 (EIGHT) HOURS.    TRAZODONE (DESYREL) 100 MG TABLET    Take 1/2 to 1 tablet at bedtime as needed for sleep.    WOMEN'S DAILY FORMULA 18 MG IRON-400 MCG-500 MG CA TAB       Modified Medications    No medications on file   Discontinued Medications    No medications on file       Review of Systems   Constitutional:  Positive for malaise/fatigue.   HENT: Negative.     Eyes: Negative.    Respiratory:  Positive for shortness of breath.    Cardiovascular:  Positive for chest pain.   Gastrointestinal: Negative.    Genitourinary: Negative.    Musculoskeletal: Negative.    Skin: Negative.    Neurological: Negative.    Endo/Heme/Allergies: Negative.    Psychiatric/Behavioral: Negative.     All 12 systems otherwise negative.      Wt Readings from Last 3 Encounters:   03/10/25 81.6 kg (179 lb 14.3 oz)   02/14/25 80.8 kg (178 lb 2.1 oz)   02/04/25 79.7 kg (175 lb 11.3 oz)     Temp Readings from Last 3 Encounters:   02/14/25 97.9 °F (36.6 °C) (Tympanic)   07/29/24 97 °F (36.1 °C) (Tympanic)   04/23/24 98.1 °F (36.7 °C) (Oral)     BP Readings from Last 3 Encounters:   03/10/25  122/80   02/14/25 132/88   02/04/25 (!) 130/90     Pulse Readings from Last 3 Encounters:   03/10/25 81   02/14/25 106   02/04/25 85       /80 (BP Location: Right arm, Patient Position: Sitting)   Pulse 81   Wt 81.6 kg (179 lb 14.3 oz)   SpO2 95%   BMI 31.87 kg/m²     Objective:   Physical Exam  Vitals and nursing note reviewed.   Constitutional:       General: She is not in acute distress.     Appearance: She is well-developed. She is not diaphoretic.   HENT:      Head: Normocephalic and atraumatic.      Nose: Nose normal.   Eyes:      General: No scleral icterus.     Conjunctiva/sclera: Conjunctivae normal.   Neck:      Thyroid: No thyromegaly.      Vascular: No JVD.   Cardiovascular:      Rate and Rhythm: Normal rate and regular rhythm.      Heart sounds: S1 normal and S2 normal. No murmur heard.     No friction rub. No gallop. No S3 or S4 sounds.   Pulmonary:      Effort: Pulmonary effort is normal. No respiratory distress.      Breath sounds: Normal breath sounds. No stridor. No wheezing or rales.   Chest:      Chest wall: No tenderness.   Abdominal:      General: Bowel sounds are normal. There is no distension.      Palpations: Abdomen is soft. There is no mass.      Tenderness: There is no abdominal tenderness. There is no rebound.   Genitourinary:     Comments: Deferred  Musculoskeletal:         General: No tenderness or deformity. Normal range of motion.      Cervical back: Normal range of motion and neck supple.   Lymphadenopathy:      Cervical: No cervical adenopathy.   Skin:     General: Skin is warm and dry.      Coloration: Skin is not pale.      Findings: No erythema or rash.   Neurological:      Mental Status: She is alert and oriented to person, place, and time.      Motor: No abnormal muscle tone.      Coordination: Coordination normal.   Psychiatric:         Behavior: Behavior normal.         Thought Content: Thought content normal.         Judgment: Judgment normal.         Lab Results    Component Value Date     07/22/2024    K 4.0 07/22/2024     07/22/2024    CO2 30 (H) 07/22/2024    BUN 14 07/22/2024    CREATININE 0.8 07/22/2024    GLU 93 07/22/2024    HGBA1C 5.2 07/22/2024    AST 19 07/22/2024    ALT 20 07/22/2024    ALBUMIN 4.0 07/22/2024    ALBUMIN 4.5 05/01/2022    PROT 6.9 07/22/2024    BILITOT 0.3 07/22/2024    WBC 7.95 07/22/2024    HGB 14.2 07/22/2024    HCT 43.6 07/22/2024    MCV 99 (H) 07/22/2024     07/22/2024    TSH 2.338 07/22/2024    CHOL 161 07/22/2024    HDL 57 07/22/2024    LDLCALC 87.0 07/22/2024    TRIG 85 07/22/2024     Assessment:      1. Hypertension associated with diabetes    2. Left ventricular diastolic dysfunction with preserved systolic function    3. Aortic atherosclerosis    4. Other chest pain    5. Hyperlipidemia associated with type 2 diabetes mellitus    6. TAYLOR (dyspnea on exertion)    7. Tobacco use    8. NALLELY (generalized anxiety disorder)    9. Tobacco dependence            Plan:   1. HTN with diastolic dysfunction - improved    - elevated at times  - titrate meds - add Norvasc 5mg     2. PreDM/DM2 - A1c 5.7 --> 5.2  - cont tx - metformin    3. HLD with aortic atheros  - titrate meds  - Recent lipids improved     4. Tobacco abuse  - is trying to quit   - will refer to cessation - went once  - start nicotine patch     5. CP/TAYLOR  - mild,  - r/o non cardiac etiologies   - will f/u pulm, neg for JAMIR  - has nebulizer     6. GERD  - cont PPI    7. Obesity -  BMI 31 - 176 lbs.  . BMI 31 - 179 lbs   - cont weight loss     8. Anxiety  - cont tx per psych     Visit today included increased complexity associated with the care of the episodic problem HTN  addressed and managing the longitudinal care of the patient due to the serious and/or complex managed problem(s) .      Thank you for allowing me to participate in this patient's care. Please do not hesitate to contact me with any questions or concerns. Consult note has been forwarded to the referral  physician.

## 2025-03-11 ENCOUNTER — HOSPITAL ENCOUNTER (OUTPATIENT)
Dept: RADIOLOGY | Facility: HOSPITAL | Age: 65
Discharge: HOME | End: 2025-03-11
Payer: MEDICARE

## 2025-03-11 ENCOUNTER — OFFICE VISIT (OUTPATIENT)
Dept: INTERNAL MEDICINE | Facility: CLINIC | Age: 65
End: 2025-03-11
Payer: MEDICARE

## 2025-03-11 ENCOUNTER — RESULTS FOLLOW-UP (OUTPATIENT)
Dept: INTERNAL MEDICINE | Facility: CLINIC | Age: 65
End: 2025-03-11

## 2025-03-11 VITALS
OXYGEN SATURATION: 95 % | HEART RATE: 104 BPM | WEIGHT: 181 LBS | RESPIRATION RATE: 16 BRPM | SYSTOLIC BLOOD PRESSURE: 132 MMHG | TEMPERATURE: 99 F | DIASTOLIC BLOOD PRESSURE: 80 MMHG | BODY MASS INDEX: 32.07 KG/M2 | HEIGHT: 63 IN

## 2025-03-11 DIAGNOSIS — L03.90 RECURRENT CELLULITIS: ICD-10-CM

## 2025-03-11 DIAGNOSIS — L03.012 CELLULITIS OF LEFT MIDDLE FINGER: ICD-10-CM

## 2025-03-11 DIAGNOSIS — L03.012 CELLULITIS OF LEFT MIDDLE FINGER: Primary | ICD-10-CM

## 2025-03-11 LAB
OHS QRS DURATION: 88 MS
OHS QTC CALCULATION: 408 MS

## 2025-03-11 PROCEDURE — 3008F BODY MASS INDEX DOCD: CPT | Mod: HCNC,CPTII,S$GLB,

## 2025-03-11 PROCEDURE — 1160F RVW MEDS BY RX/DR IN RCRD: CPT | Mod: HCNC,CPTII,S$GLB,

## 2025-03-11 PROCEDURE — 99214 OFFICE O/P EST MOD 30 MIN: CPT | Mod: HCNC,S$GLB,,

## 2025-03-11 PROCEDURE — 3075F SYST BP GE 130 - 139MM HG: CPT | Mod: HCNC,CPTII,S$GLB,

## 2025-03-11 PROCEDURE — 73140 X-RAY EXAM OF FINGER(S): CPT | Mod: TC,HCNC,FY,PO

## 2025-03-11 PROCEDURE — 99999 PR PBB SHADOW E&M-EST. PATIENT-LVL V: CPT | Mod: PBBFAC,HCNC,,

## 2025-03-11 PROCEDURE — 73140 X-RAY EXAM OF FINGER(S): CPT | Mod: 26,HCNC,LT, | Performed by: RADIOLOGY

## 2025-03-11 PROCEDURE — 3079F DIAST BP 80-89 MM HG: CPT | Mod: HCNC,CPTII,S$GLB,

## 2025-03-11 PROCEDURE — 1159F MED LIST DOCD IN RCRD: CPT | Mod: HCNC,CPTII,S$GLB,

## 2025-03-11 RX ORDER — CEPHALEXIN 500 MG/1
500 CAPSULE ORAL EVERY 12 HOURS
Qty: 20 CAPSULE | Refills: 0 | Status: CANCELLED | OUTPATIENT
Start: 2025-03-11 | End: 2025-03-21

## 2025-03-11 RX ORDER — DOXYCYCLINE 100 MG/1
100 CAPSULE ORAL 2 TIMES DAILY
Qty: 20 CAPSULE | Refills: 0 | Status: SHIPPED | OUTPATIENT
Start: 2025-03-11 | End: 2025-03-21

## 2025-03-11 NOTE — PROGRESS NOTES
"Subjective:       Patient ID: Melita Romero is a 64 y.o. female.    Chief Complaint: Finger Infection (Lt. Middle Finger w/ Swelling & Pain)    History of Present Illness    CHIEF COMPLAINT:  Patient presents today for follow up of finger infection.    HISTORY OF PRESENT ILLNESS:  Patient was seen for the same thing on 2/14.  Diagnosed with cellulitis of left middle finger.  Prescribed Bactrim.  Pt completed Bactrim on 2/24, condition improved somewhat.  She noted her left middle finger had  two small spots on the finger pad on 2/19. On February 21st, skin began peeling in the affected area. The condition initially improved but did not completely resolve with a 10-day course of antibiotics. Starting March 8th, she experienced worsening symptoms documented through photographs. She currently reports pain, swelling, redness, and decreased range of motion in the affected finger. The area feels warm at times, but she denies fever.           /80 (BP Location: Right arm, Patient Position: Sitting)   Pulse 104   Temp 98.5 °F (36.9 °C) (Tympanic)   Resp 16   Ht 5' 3" (1.6 m)   Wt 82.1 kg (181 lb)   SpO2 95%   BMI 32.06 kg/m²     Review of Systems   Constitutional:  Negative for chills and fever.   Eyes:  Negative for visual disturbance.   Respiratory:  Negative for chest tightness and shortness of breath.    Cardiovascular:  Negative for chest pain, palpitations and leg swelling.   Gastrointestinal:  Negative for constipation, diarrhea, nausea and vomiting.   Genitourinary:  Negative for difficulty urinating.   Musculoskeletal:  Positive for arthralgias.   Skin:  Positive for color change.   Neurological:  Negative for headaches.   All other systems reviewed and are negative.      Objective:      Physical Exam  Vitals reviewed.   Constitutional:       General: She is not in acute distress.     Appearance: Normal appearance. She is not ill-appearing or toxic-appearing.   HENT:      Head: Normocephalic and " atraumatic.   Eyes:      Pupils: Pupils are equal, round, and reactive to light.   Cardiovascular:      Rate and Rhythm: Normal rate and regular rhythm.   Pulmonary:      Effort: Pulmonary effort is normal.      Breath sounds: Normal breath sounds.   Abdominal:      General: Bowel sounds are normal.      Palpations: Abdomen is soft.   Musculoskeletal:         General: Normal range of motion.      Cervical back: Normal range of motion and neck supple.   Skin:     General: Skin is warm and dry.      Findings: Erythema present.      Comments: Left middle finger with erythema, warmth, and swelling.  Skin intact.  See photos   Neurological:      General: No focal deficit present.      Mental Status: She is alert and oriented to person, place, and time.   Psychiatric:         Mood and Affect: Mood normal.         Behavior: Behavior normal.         Thought Content: Thought content normal.         Judgment: Judgment normal.                   Assessment:       1. Cellulitis of left middle finger    2. Recurrent cellulitis        Plan:       Melita was seen today for finger infection.    Diagnoses and all orders for this visit:    Cellulitis of left middle finger  -     doxycycline (VIBRAMYCIN) 100 MG Cap; Take 1 capsule (100 mg total) by mouth 2 (two) times daily. for 10 days  -     X-Ray Finger 2 or More Views; Future    Recurrent cellulitis  -     doxycycline (VIBRAMYCIN) 100 MG Cap; Take 1 capsule (100 mg total) by mouth 2 (two) times daily. for 10 days  -     X-Ray Finger 2 or More Views; Future    We will follow x-ray results and make recommendations prn.  Patient instructed to elevat left hand above level of heart as much as possible.  May apply ice prn pain.    May take Tylenol or ibuprofen for pain prn  Monitor for worsening symptoms.    Follow up if symptoms worsen or fail to improve.

## 2025-03-17 RX ORDER — SERTRALINE HYDROCHLORIDE 50 MG/1
50 TABLET, FILM COATED ORAL
Qty: 30 TABLET | Refills: 1 | Status: SHIPPED | OUTPATIENT
Start: 2025-03-17

## 2025-03-19 ENCOUNTER — PATIENT MESSAGE (OUTPATIENT)
Dept: UROLOGY | Facility: CLINIC | Age: 65
End: 2025-03-19
Payer: MEDICARE

## 2025-03-19 ENCOUNTER — TELEPHONE (OUTPATIENT)
Dept: UROLOGY | Facility: CLINIC | Age: 65
End: 2025-03-19
Payer: MEDICARE

## 2025-03-19 NOTE — TELEPHONE ENCOUNTER
.Outgoing call attempted to notify patient regarding rescheduling her upcoming appointment but no answer, left voice message with contact information letting patient know she can contact us at her earliest convenience or she may reschedule via [employer] .

## 2025-03-31 NOTE — TELEPHONE ENCOUNTER
No care due was identified.  [facility] Embedded Care Due Messages. Reference number: [id number].   3/31/2025 12:27:35 PM CDT

## 2025-04-01 ENCOUNTER — OFFICE VISIT (OUTPATIENT)
Dept: PSYCHIATRY | Facility: CLINIC | Age: 65
End: 2025-04-01
Payer: MEDICARE

## 2025-04-01 ENCOUNTER — PATIENT MESSAGE (OUTPATIENT)
Dept: PSYCHIATRY | Facility: CLINIC | Age: 65
End: 2025-04-01
Payer: MEDICARE

## 2025-04-01 VITALS
DIASTOLIC BLOOD PRESSURE: 83 MMHG | BODY MASS INDEX: 31.79 KG/M2 | HEART RATE: 86 BPM | SYSTOLIC BLOOD PRESSURE: 154 MMHG | WEIGHT: 179.44 LBS

## 2025-04-01 DIAGNOSIS — F41.1 GENERALIZED ANXIETY DISORDER: ICD-10-CM

## 2025-04-01 DIAGNOSIS — F32.A CHRONIC DEPRESSION: Primary | ICD-10-CM

## 2025-04-01 DIAGNOSIS — G47.00 INSOMNIA, UNSPECIFIED TYPE: ICD-10-CM

## 2025-04-01 PROCEDURE — 99999 PR PBB SHADOW E&M-EST. PATIENT-LVL II: CPT | Mod: PBBFAC,HCNC,, | Performed by: PSYCHIATRY & NEUROLOGY

## 2025-04-01 PROCEDURE — 3079F DIAST BP 80-89 MM HG: CPT | Mod: HCNC,CPTII,S$GLB, | Performed by: PSYCHIATRY & NEUROLOGY

## 2025-04-01 PROCEDURE — 99214 OFFICE O/P EST MOD 30 MIN: CPT | Mod: HCNC,S$GLB,, | Performed by: PSYCHIATRY & NEUROLOGY

## 2025-04-01 PROCEDURE — 3077F SYST BP >= 140 MM HG: CPT | Mod: HCNC,CPTII,S$GLB, | Performed by: PSYCHIATRY & NEUROLOGY

## 2025-04-01 PROCEDURE — 3008F BODY MASS INDEX DOCD: CPT | Mod: HCNC,CPTII,S$GLB, | Performed by: PSYCHIATRY & NEUROLOGY

## 2025-04-01 RX ORDER — VILAZODONE HYDROCHLORIDE 20 MG/1
TABLET ORAL
Qty: 30 TABLET | Refills: 3 | Status: SHIPPED | OUTPATIENT
Start: 2025-04-01

## 2025-04-01 RX ORDER — MIRTAZAPINE 15 MG/1
15 TABLET, FILM COATED ORAL NIGHTLY
Qty: 30 TABLET | Refills: 3 | Status: SHIPPED | OUTPATIENT
Start: 2025-04-01 | End: 2026-04-01
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
## 2025-04-01 NOTE — TELEPHONE ENCOUNTER
No care due was identified.  [facility] Embedded Care Due Messages. Reference number: [id number].   4/01/2025 5:39:52 PM CDT   I agree with the fellow's findings and plans as written above with the following additions/amendments:    Seen and examined at bedside, no complaints, working with PT. ELVI workup pending as above, stable. Further recs as above

## 2025-04-02 RX ORDER — TIZANIDINE 4 MG/1
4 TABLET ORAL EVERY 8 HOURS
Qty: 90 TABLET | Refills: 0 | Status: SHIPPED | OUTPATIENT
Start: 2025-04-02

## 2025-04-04 ENCOUNTER — PATIENT MESSAGE (OUTPATIENT)
Dept: PSYCHIATRY | Facility: CLINIC | Age: 65
End: 2025-04-04
Payer: MEDICARE

## 2025-04-22 ENCOUNTER — PATIENT MESSAGE (OUTPATIENT)
Dept: ADMINISTRATIVE | Facility: HOSPITAL | Age: 65
End: 2025-04-22
Payer: MEDICARE

## 2025-04-30 ENCOUNTER — OFFICE VISIT (OUTPATIENT)
Dept: UROLOGY | Facility: CLINIC | Age: 65
End: 2025-04-30
Payer: MEDICARE

## 2025-04-30 VITALS
DIASTOLIC BLOOD PRESSURE: 77 MMHG | SYSTOLIC BLOOD PRESSURE: 121 MMHG | RESPIRATION RATE: 18 BRPM | HEART RATE: 83 BPM | HEIGHT: 63 IN | WEIGHT: 182.13 LBS | TEMPERATURE: 98 F | BODY MASS INDEX: 32.27 KG/M2

## 2025-04-30 DIAGNOSIS — N39.46 MIXED INCONTINENCE: Primary | ICD-10-CM

## 2025-04-30 DIAGNOSIS — N22 CALCULUS OF URINARY TRACT IN DISEASES CLASSIFIED ELSEWHERE: ICD-10-CM

## 2025-04-30 DIAGNOSIS — R10.2 PELVIC PAIN: ICD-10-CM

## 2025-04-30 DIAGNOSIS — N32.81 OAB (OVERACTIVE BLADDER): ICD-10-CM

## 2025-04-30 LAB
BILIRUBIN, UA POC OHS: NEGATIVE
BLOOD, UA POC OHS: NEGATIVE
CLARITY, UA POC OHS: CLEAR
COLOR, UA POC OHS: YELLOW
GLUCOSE, UA POC OHS: NEGATIVE
KETONES, UA POC OHS: NEGATIVE
LEUKOCYTES, UA POC OHS: NEGATIVE
NITRITE, UA POC OHS: NEGATIVE
PH, UA POC OHS: 7
POC RESIDUAL URINE VOLUME: 109 ML (ref 0–100)
PROTEIN, UA POC OHS: NEGATIVE
SPECIFIC GRAVITY, UA POC OHS: 1.01
UROBILINOGEN, UA POC OHS: 0.2

## 2025-04-30 PROCEDURE — 3008F BODY MASS INDEX DOCD: CPT | Mod: CPTII,HCNC,S$GLB, | Performed by: UROLOGY

## 2025-04-30 PROCEDURE — 99999 PR PBB SHADOW E&M-EST. PATIENT-LVL V: CPT | Mod: PBBFAC,HCNC,, | Performed by: UROLOGY

## 2025-04-30 PROCEDURE — 3078F DIAST BP <80 MM HG: CPT | Mod: CPTII,HCNC,S$GLB, | Performed by: UROLOGY

## 2025-04-30 PROCEDURE — 1159F MED LIST DOCD IN RCRD: CPT | Mod: CPTII,HCNC,S$GLB, | Performed by: UROLOGY

## 2025-04-30 PROCEDURE — 81003 URINALYSIS AUTO W/O SCOPE: CPT | Mod: QW,HCNC,S$GLB, | Performed by: UROLOGY

## 2025-04-30 PROCEDURE — 3074F SYST BP LT 130 MM HG: CPT | Mod: CPTII,HCNC,S$GLB, | Performed by: UROLOGY

## 2025-04-30 PROCEDURE — 1160F RVW MEDS BY RX/DR IN RCRD: CPT | Mod: CPTII,HCNC,S$GLB, | Performed by: UROLOGY

## 2025-04-30 PROCEDURE — 99214 OFFICE O/P EST MOD 30 MIN: CPT | Mod: HCNC,S$GLB,, | Performed by: UROLOGY

## 2025-04-30 PROCEDURE — 51798 US URINE CAPACITY MEASURE: CPT | Mod: HCNC,S$GLB,, | Performed by: UROLOGY

## 2025-04-30 PROCEDURE — 87086 URINE CULTURE/COLONY COUNT: CPT | Mod: HCNC | Performed by: UROLOGY

## 2025-04-30 RX ORDER — OXYBUTYNIN CHLORIDE 10 MG/1
10 TABLET, EXTENDED RELEASE ORAL DAILY
Qty: 90 TABLET | Refills: 4 | Status: SHIPPED | OUTPATIENT
Start: 2025-04-30 | End: 2026-04-30

## 2025-04-30 NOTE — PROGRESS NOTES
"  Chief Complaint   Patient presents with    Follow-up     Kidney infection         History of Present Illness:   Melita Romero is a 64 y.o. female here for evaluation of Follow-up (Kidney infection)    4/30/25- pt reports a "sticking sensation" in her vagina and more urinary frquency. She has chronic back pain. Sensation doesn't improve with urination. Incontinence has been okay up until a week ago. She wears 1 -2 pads per day. More UUI over the last week or 2. She continues to take oxybutynin. No fever.   4/23/24-Pt thinks she is passing a stone. She reports RLQ and R flank pain, which seemed to acutely worsen over the past 2 days. Slight dysuria. Not urinating much. Taking oxybutynin, which helps significantly.   4/18/23-oxybutynin helps significantly. Still wears 1 pad a day just in case. She has learned to not wait to go. No recent stone passage. She does have some right sided pain, unsure of etiology. No gross hematuria. No constipation.   KUB today personally reviewed, showing no obvious stone.   1/11/22-Pt reports h/o urolithiasis. She does take chronic pain medication, and states that she may not feel the pain. However, she does report sometimes she has bladder pain and right flank pain. She also has ADDY with sneeze, cough, run and laugh. She wears 2 pads per day. She does have some UUI first thing in the am. Incontinence has been ongoing for a year or so. Maybe more often now. She reports that a long time ago, she was on a bladder medication, but stopped it. She states that it did help. UUI>ADDY. No gross hematuria. Sometimes she has frequency up to 50 times a day.         Review of Systems   Constitutional:  Negative for chills and fever.   Respiratory:  Negative for shortness of breath.    Cardiovascular:  Negative for chest pain.   Gastrointestinal:  Negative for constipation and diarrhea.   Genitourinary:  Negative for difficulty urinating.   All other systems reviewed and are negative.      Past " Medical History:   Diagnosis Date    Abnormal glucose     Anxiety     Arthritis     Back pain     Controlled type 2 diabetes mellitus without complication, without long-term current use of insulin 2017    DDD (degenerative disc disease), lumbar     Depression     Diverticulosis of colon 2020    Fibromyalgia     Flank pain 2021    History of colon polyps 2019    Hyperlipidemia     Hypertension     Mild intermittent asthma without complication 2023    Thyroid disease     Tobacco dependence     Trouble in sleeping     Urinary incontinence     wears pads daily       Past Surgical History:   Procedure Laterality Date    COLONOSCOPY N/A 2020    Procedure: COLONOSCOPY;  Surgeon: Jacob Lauren MD;  Location: [facility] ENDO;  Service: Endoscopy;  Laterality: N/A;    HYSTERECTOMY  2003    complete    OOPHORECTOMY Bilateral     SHOULDER ARTHROSCOPY Right 2017    Dr. Nima Coyne    TONSILLECTOMY         Family History   Problem Relation Name Age of Onset    Glaucoma Mother Crystal     Diabetes Mother Crystal     Arthritis Mother Crystal     Cancer Mother Crystal     Cancer Father O     Hypertension Brother K     Diabetes Brother K     Arthritis Brother K     Heart disease Maternal Uncle V     Arthritis Son Anthony     COPD Paternal Aunt Bambi        Social History     Tobacco Use    Smoking status: Every Day     Current packs/day: 0.00     Average packs/day: 0.5 packs/day for 25.7 years (12.9 ttl pk-yrs)     Types: Cigarettes     Start date: 10/29/1994     Last attempt to quit: 2020     Years since quittin.7     Passive exposure: Current    Smokeless tobacco: Never   Substance Use Topics    Alcohol use: Yes     Alcohol/week: 2.0 standard drinks of alcohol     Comment: rarely glass of wine    Drug use: No       Current Outpatient Medications   Medication Sig Dispense Refill    albuterol (PROVENTIL) 2.5 mg /3 mL (0.083 %) nebulizer solution Take 3 mLs (2.5 mg total) by nebulization every 6  (six) hours as needed for Wheezing. Rescue 180 mL 3    albuterol (VENTOLIN HFA) 90 mcg/actuation inhaler Inhale 2 puffs into the lungs every 6 (six) hours as needed for Wheezing. Rescue 18 g 5    amLODIPine (NORVASC) 5 MG tablet Take 1 tablet (5 mg total) by mouth once daily. 90 tablet 1    aspirin 81 mg Tab Take 1 tablet by mouth Daily.      atorvastatin (LIPITOR) 40 MG tablet Take 1 tablet (40 mg total) by mouth once daily. 90 tablet 3    cholecalciferol, vitamin D3, 2,000 unit Tab Take 1 tablet by mouth Daily.      clonazePAM (KLONOPIN) 0.5 MG tablet Take 1 tablet (0.5 mg total) by mouth daily as needed for Anxiety. 30 tablet 2    fluticasone-salmeterol diskus inhaler 250-50 mcg Inhale 1 puff into the lungs 2 (two) times daily. Controller 60 each 11    hydrocodone-acetaminophen 10-325mg (NORCO)  mg Tab Take 1 tablet by mouth every 8 (eight) hours as needed. 75 tablet 0    losartan-hydrochlorothiazide 100-12.5 mg (HYZAAR) 100-12.5 mg Tab Take 1 tablet by mouth once daily. 90 tablet 3    metFORMIN (GLUCOPHAGE-XR) 500 MG ER 24hr tablet Take 1 tablet (500 mg total) by mouth 2 (two) times daily with meals. 180 tablet 1    mirtazapine (REMERON) 15 MG tablet Take 1 tablet (15 mg total) by mouth every evening. 30 tablet 3    multivitamin-Ca-iron-minerals 18-0.4 mg Tab Take 1 tablet by mouth Daily.      pantoprazole (PROTONIX) 40 MG tablet Take 1 tablet (40 mg total) by mouth once daily. 90 tablet 3    pregabalin (LYRICA) 50 MG capsule Take 50 mg by mouth 2 (two) times daily.      tiZANidine (ZANAFLEX) 4 MG tablet TAKE 1 TABLET (4 MG TOTAL) BY MOUTH EVERY 8 (EIGHT) HOURS. 90 tablet 0    tiZANidine (ZANAFLEX) 4 MG tablet Take 1 tablet (4 mg total) by mouth every 8 (eight) hours. 90 tablet 0    vilazodone (VIIBRYD) 20 mg Tab Take 1/2 tablet daily for 7 days then take 1 daily thereafter 30 tablet 3    WOMEN'S DAILY FORMULA 18 mg iron-400 mcg-500 mg Ca Tab       oxybutynin (DITROPAN-XL) 10 MG 24 hr tablet Take 1 tablet  (10 mg total) by mouth once daily. 90 tablet 4     No current facility-administered medications for this visit.       Review of patient's allergies indicates:   Allergen Reactions    Fish containing products Other (See Comments)     Difficulty breathing    Iodine Other (See Comments)    Shellfish containing products      Other reaction(s): Difficulty breathing  Other reaction(s): Difficulty breathing    Latex Rash       Physical Exam  Vitals:    04/30/25 1457   BP: 121/77   Pulse: 83   Resp: 18   Temp: 98.2 °F (36.8 °C)     General: Well-developed, well-nourished, in no acute distress  HEENT: Normocephalic, atraumatic, extraocular movements intact  Neck: Supple, no supraclavicular or cervical lymphadenopathy, trachea midline  Respirations: even and unlabored  Abdomen: soft, suprapubic tenderness, non-distended, no palpable masses, no rebound or guarding, well-healed pfannensteil scar  : 1/11/22-Normal external female genitalia without lesions. Orthotopic urethral meatus. atrophic vaginal mucosa. No Cysotcele, Grade 2 Rectocele, No apical prolapse, + cough stress test, no significant urethral hypermobility  Extremities: moves all equally, no clubbing, cyanosis or edema  Skin: Warm and dry. No lesions  Psych: normal affect  Neuro: Alert and oriented x 3. Cranial nerves II-XII intact    PVR: 109cc    Urinalysis  Negative for blood, LE, nit      Assessment:  1. Mixed incontinence  POCT Urinalysis(Instrument)    POCT Bladder Scan      2. OAB (overactive bladder)  POCT Urinalysis(Instrument)    POCT Bladder Scan      3. Pelvic pain  Urine Culture High Risk      4. Calculus of urinary tract in diseases classified elsewhere  US Retroperitoneal Complete              Plan:   Mixed incontinence  -     POCT Urinalysis(Instrument)  -     POCT Bladder Scan    OAB (overactive bladder)  -     POCT Urinalysis(Instrument)  -     POCT Bladder Scan    Pelvic pain  -     Urine Culture High Risk    Calculus of urinary tract in diseases  classified elsewhere  -     US Retroperitoneal Complete; Future; Expected date: 04/30/2025    Other orders  -     oxybutynin (DITROPAN-XL) 10 MG 24 hr tablet; Take 1 tablet (10 mg total) by mouth once daily.  Dispense: 90 tablet; Refill: 4        Follow up in about 1 year (around 4/30/2026).

## 2025-05-02 LAB — BACTERIA UR CULT: NO GROWTH

## 2025-05-06 ENCOUNTER — HOSPITAL ENCOUNTER (OUTPATIENT)
Dept: RADIOLOGY | Facility: HOSPITAL | Age: 65
Discharge: HOME | End: 2025-05-06
Attending: UROLOGY
Payer: MEDICARE

## 2025-05-06 DIAGNOSIS — N22 CALCULUS OF URINARY TRACT IN DISEASES CLASSIFIED ELSEWHERE: ICD-10-CM

## 2025-05-06 PROCEDURE — 76770 US EXAM ABDO BACK WALL COMP: CPT | Mod: TC,HCNC,PO

## 2025-05-06 PROCEDURE — 76770 US EXAM ABDO BACK WALL COMP: CPT | Mod: 26,HCNC,, | Performed by: RADIOLOGY

## 2025-05-07 ENCOUNTER — PATIENT MESSAGE (OUTPATIENT)
Dept: OPHTHALMOLOGY | Facility: CLINIC | Age: 65
End: 2025-05-07
Payer: MEDICARE

## 2025-05-19 DIAGNOSIS — E11.36 TYPE 2 DIABETES MELLITUS WITH DIABETIC CATARACT, WITHOUT LONG-TERM CURRENT USE OF INSULIN: Chronic | ICD-10-CM

## 2025-05-19 RX ORDER — TIZANIDINE 4 MG/1
4 TABLET ORAL EVERY 8 HOURS
Qty: 90 TABLET | Refills: 0 | Status: SHIPPED | OUTPATIENT
Start: 2025-05-19

## 2025-05-19 RX ORDER — METFORMIN HYDROCHLORIDE 500 MG/1
500 TABLET, EXTENDED RELEASE ORAL 2 TIMES DAILY WITH MEALS
Qty: 180 TABLET | Refills: 1 | Status: SHIPPED | OUTPATIENT
Start: 2025-05-19

## 2025-05-19 NOTE — TELEPHONE ENCOUNTER
LOV 02/2025 for Cellulitis with Anali. LOV for chronic issues was 07/2024 , No future appt scheduled

## 2025-05-19 NOTE — TELEPHONE ENCOUNTER
Care Due:                  Date            Visit Type   Department     Provider  --------------------------------------------------------------------------------                                             [facility] INTERNAL  Sonya  Last Visit: 07-      [facility]          DANE Pak  Next Visit: None Scheduled  None         None Found                                                            Last  Test          Frequency    Reason                     Performed    Due Date  --------------------------------------------------------------------------------    Cr..........  12 months..  metFORMIN................  07- 07-    HBA1C.......  6 months...  metFORMIN................  07- 01-    [employer] Embedded Care Due Messages. Reference number: [id number].   5/19/2025 2:35:58 PM CDT

## 2025-07-01 ENCOUNTER — OFFICE VISIT (OUTPATIENT)
Dept: PSYCHIATRY | Facility: CLINIC | Age: 65
End: 2025-07-01
Payer: MEDICARE

## 2025-07-01 DIAGNOSIS — F32.A CHRONIC DEPRESSION: ICD-10-CM

## 2025-07-01 DIAGNOSIS — F41.1 GENERALIZED ANXIETY DISORDER: Primary | ICD-10-CM

## 2025-07-01 PROCEDURE — 98006 SYNCH AUDIO-VIDEO EST MOD 30: CPT | Mod: HCNC,95,, | Performed by: PSYCHIATRY & NEUROLOGY

## 2025-07-01 RX ORDER — MIRTAZAPINE 15 MG/1
15 TABLET, FILM COATED ORAL NIGHTLY
Qty: 90 TABLET | Refills: 1 | Status: SHIPPED | OUTPATIENT
Start: 2025-07-01 | End: 2026-07-01

## 2025-07-01 RX ORDER — CLONAZEPAM 0.5 MG/1
0.5 TABLET ORAL DAILY PRN
Qty: 30 TABLET | Refills: 1 | Status: SHIPPED | OUTPATIENT
Start: 2025-07-01 | End: 2026-07-01

## 2025-07-01 RX ORDER — VILAZODONE HYDROCHLORIDE 20 MG/1
TABLET ORAL
Qty: 90 TABLET | Refills: 1 | Status: SHIPPED | OUTPATIENT
Start: 2025-07-01

## 2025-07-01 NOTE — TELEPHONE ENCOUNTER
LOV 02/2025. NOV 08/08/2025 Annual. Rx pended. I will also put in and schedule pt's annual labs for the week before her annual appointment Rx pended

## 2025-07-01 NOTE — TELEPHONE ENCOUNTER
No care due was identified.  [facility] Embedded Care Due Messages. Reference number: [id number].   7/01/2025 10:56:04 AM CDT

## 2025-07-01 NOTE — PROGRESS NOTES
"The patient location is: [place]  The chief complaint leading to consultation is: follow-up, depression, anxiety    Visit type: audiovisual    Face to Face time with patient:     30 minutes of total time spent on the encounter, which includes face to face time and non-face to face time preparing to see the pt (eg, review of tests), Obtaining and/or reviewing separately obtained history, Documenting clinical information in the electronic or other health record, Independently interpreting results (not separately reported) & communicating results to the pt/family/caregiver, or care coordination (not separately reported).     Each pt to whom he or she provides medical services by telemedicine is: (1) informed of the relationship between the physician & patient and the respective role of any other health care provider with respect to management of the pt; & (2) notified that he or she may decline to receive medical services by telemedicine and may withdraw from such care at any time.    Outpatient Psychiatry Follow-up Visit (MD/NP)    7/1/2025    Melita Romero, a 64 y.o. female, presenting for follow-up visit. Met with patient.    Reason for Encounter: Patient complains of depression, anxiety    History of Present Illness: Patient presents for follow-up to discuss her response to psychiatric medication for depression and anxiety, which was last prescribed approximately three months ago in early April.    Patient reports significant improvement in her condition since starting the prescribed medication, describing it as having made "an extreme difference" in her overall well-being. Specific improvements include better sleep, reduced frequency of crying (from daily to none), and decreased stress levels. She also notes less aggravation and frustration in her daily life.    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 She takes half a Klonopin tablet "maybe twice weekly" when feeling particularly anxious, reporting that it "cuts the edge" and allows her to continue functioning.    Patient is currently taking fluvoxamine 20 mg daily, typically in the mid-morning, and mirtazapine 15 mg at bedtime. She reports no noticeable side effects from these medications.    Patient mentions having scheduled her yearly follow-up for the beginning of August and upcoming appointments with her therapist, Mr. Garrido, scheduled for August.    MEDICATIONS: Patient is on Fluoxetine (Prozac) 20 mg daily, which has been effective in improving mood and reducing stress. She takes Mirtazapine 15 mg at bedtime. For anxiety, she uses Clonazepam (Klonopin) 0.5 mg as needed, about twice weekly. This medication was previously discontinued but recently resumed at half dose and is effective in reducing anxiety. Patient is also on Vilazodone 20 mg daily, taken mid-morning or morning, which has been effective in improving her overall condition without noticeable side effects.    FAMILY HISTORY: Family history is significant for brother with Stage IV cancer and a left kidney removal. Patient's sister-in-law passed away 3.5 years ago.         Review Of Systems:     GENERAL:  No weight gain or loss  SKIN:  No rashes or lacerations  HEAD:  No headaches  EYES:  No exophthalmos, jaundice or blindness  URINARY:  No frequency, dysuria or sexual dysfunction  ENDOCRINE:  No polydipsia, polyuria  MUSCULOSKELETAL:  No pain or stiffness of the joints  NEUROLOGIC:  No weakness, sensory changes, seizures, confusion, memory loss, tremor or other abnormal movements    Current Evaluation:     Nutritional Screening: Considering the patient's height and weight, medications, medical history and preferences, should a referral be made to the dietitian? no    Constitutional  Vitals:  Most recent vital signs, dated " less than 90 days prior to this appointment, were reviewed.    There were no vitals filed for this visit.     General:  unremarkable, age appropriate     Musculoskeletal  Muscle Strength/Tone:  no tremor, no tic   Gait & Station:  non-ataxic     Psychiatric  Appearance: casually dressed & groomed;   Behavior: calm,   Cooperation: cooperative with assessment  Speech: normal rate, volume, tone  Thought Process: linear, goal-directed  Thought Content: No suicidal or homicidal ideation; no delusions  Affect: normal range  Mood: euthymic  Perceptions: No auditory or visual hallucinations  Level of Consciousness: alert throughout interview  Insight: fair  Cognition: Oriented to person, place, time, & situation  Memory: no apparent deficits to general clinical interview; not formally assessed  Attention/Concentration: no apparent deficits to general clinical interview; not formally assessed  Fund of Knowledge: average by vocabulary/education    Laboratory Data  No visits with results within 1 Month(s) from this visit.   Latest known visit with results is:   Office Visit on 04/30/2025   Component Date Value Ref Range Status    Color, POC UA 04/30/2025 Yellow  Yellow, Straw, Colorless Final    Clarity, POC UA 04/30/2025 Clear  Clear Final    Glucose, POC UA 04/30/2025 Negative  Negative Final    Bilirubin, POC UA 04/30/2025 Negative  Negative Final    Ketones, POC UA 04/30/2025 Negative  Negative Final    Spec Grav POC UA 04/30/2025 1.010  1.005 - 1.030 Final    Blood, POC UA 04/30/2025 Negative  Negative Final    pH, POC UA 04/30/2025 7.0  5.0 - 8.0 Final    Protein, POC UA 04/30/2025 Negative  Negative Final    Urobilinogen, POC UA 04/30/2025 0.2  <=1.0 Final    Nitrite, POC UA 04/30/2025 Negative  Negative Final    WBC, POC UA 04/30/2025 Negative  Negative Final    POC Residual Urine Volume 04/30/2025 109 (A)  0 - 100 mL Final    Urine Culture 04/30/2025 No Growth   Final     Medications  Encounter  Medications[1]    Assessment - Diagnosis - Goals:     Impression: 64 year old woman with chronic depression and anxiety, symptoms improved, stable. Tolerating treatment.     Treatment Plan/Recommendations:    Major depressive disorder, recurrent, partial remission  Generalized Anxiety disorder, unspecified    Patient reports significant improvement in symptoms with current medication regimen, including better sleep, reduced crying, and less stress.  Discussed appropriate use of clonazepam for occasional anxiety management, particularly in relation to new caregiver role for brother with cancer.  Current medication efficacy and side effects assessed, determining no changes needed at this time.    F32.9 MAJOR DEPRESSIVE DISORDER  1. Continue fluoxetine 20 mg daily, to be taken mid-morning.  2. Continue mirtazapine 15 mg at bedtime.  3. Continue scheduled appointments with therapist Mr. Ponce.  4. Follow up in 4 months, unless sooner appointment is needed.    GENERALIZED ANXIETY DISORDER:  1. Continue fluoxetine  2. Restart clonazepam 0.5 mg as needed for anxiety, with instructions to take half a tablet (0.25 mg) when feeling anxious.    LIFESTYLE CHANGES:  1. Given option to schedule next appointment either in-clinic or via video, based on preference.  2. Contact the office if any issues arise before next scheduled appointment.          Return to Clinic: 4 months    HAYDE Doss MD  Psychiatry  [facility]   [id number] [place] [address].  [phone]    This note was generated with the assistance of ambient listening technology. Verbal consent was obtained by the patient and accompanying visitor(s) for the recording of patient appointment to facilitate this note. I attest to having reviewed and edited the generated note for accuracy, though some syntax or spelling errors may persist. Please contact the author of this note for any clarification.                  [1]   Outpatient Encounter Medications as of  7/1/2025   Medication Sig Dispense Refill    albuterol (PROVENTIL) 2.5 mg /3 mL (0.083 %) nebulizer solution Take 3 mLs (2.5 mg total) by nebulization every 6 (six) hours as needed for Wheezing. Rescue 180 mL 3    albuterol (VENTOLIN HFA) 90 mcg/actuation inhaler Inhale 2 puffs into the lungs every 6 (six) hours as needed for Wheezing. Rescue 18 g 5    amLODIPine (NORVASC) 5 MG tablet Take 1 tablet (5 mg total) by mouth once daily. 90 tablet 1    aspirin 81 mg Tab Take 1 tablet by mouth Daily.      atorvastatin (LIPITOR) 40 MG tablet Take 1 tablet (40 mg total) by mouth once daily. 90 tablet 3    cholecalciferol, vitamin D3, 2,000 unit Tab Take 1 tablet by mouth Daily.      clonazePAM (KLONOPIN) 0.5 MG tablet Take 1 tablet (0.5 mg total) by mouth daily as needed for Anxiety. 30 tablet 1    fluticasone-salmeterol diskus inhaler 250-50 mcg Inhale 1 puff into the lungs 2 (two) times daily. Controller 60 each 11    hydrocodone-acetaminophen 10-325mg (NORCO)  mg Tab Take 1 tablet by mouth every 8 (eight) hours as needed. 75 tablet 0    losartan-hydrochlorothiazide 100-12.5 mg (HYZAAR) 100-12.5 mg Tab Take 1 tablet by mouth once daily. 90 tablet 3    metFORMIN (GLUCOPHAGE-XR) 500 MG ER 24hr tablet Take 1 tablet (500 mg total) by mouth 2 (two) times daily with meals. 180 tablet 1    mirtazapine (REMERON) 15 MG tablet Take 1 tablet (15 mg total) by mouth every evening. 90 tablet 1    multivitamin-Ca-iron-minerals 18-0.4 mg Tab Take 1 tablet by mouth Daily.      oxybutynin (DITROPAN-XL) 10 MG 24 hr tablet Take 1 tablet (10 mg total) by mouth once daily. 90 tablet 4    pantoprazole (PROTONIX) 40 MG tablet Take 1 tablet (40 mg total) by mouth once daily. 90 tablet 3    pregabalin (LYRICA) 50 MG capsule Take 50 mg by mouth 2 (two) times daily.      tiZANidine (ZANAFLEX) 4 MG tablet Take 1 tablet (4 mg total) by mouth every 8 (eight) hours. 90 tablet 0    vilazodone (VIIBRYD) 20 mg Tab Take 1 daily. 90 tablet 1    WOMEN'S  DAILY FORMULA 18 mg iron-400 mcg-500 mg Ca Tab       [DISCONTINUED] clonazePAM (KLONOPIN) 0.5 MG tablet Take 1 tablet (0.5 mg total) by mouth daily as needed for Anxiety. 30 tablet 2    [DISCONTINUED] mirtazapine (REMERON) 15 MG tablet Take 1 tablet (15 mg total) by mouth every evening. 30 tablet 3    [DISCONTINUED] tiZANidine (ZANAFLEX) 4 MG tablet Take 1 tablet (4 mg total) by mouth every 8 (eight) hours. 90 tablet 0    [DISCONTINUED] vilazodone (VIIBRYD) 20 mg Tab Take 1/2 tablet daily for 7 days then take 1 daily thereafter 30 tablet 3     No facility-administered encounter medications on file as of 7/1/2025.

## 2025-07-02 ENCOUNTER — TELEPHONE (OUTPATIENT)
Dept: PHARMACY | Facility: CLINIC | Age: 65
End: 2025-07-02
Payer: MEDICARE

## 2025-07-02 RX ORDER — TIZANIDINE 4 MG/1
4 TABLET ORAL EVERY 8 HOURS
Qty: 90 TABLET | Refills: 0 | Status: SHIPPED | OUTPATIENT
Start: 2025-07-02

## 2025-07-02 NOTE — TELEPHONE ENCOUNTER
[facility]/Population Health Chart Review & Patient Outreach Details For Medication Adherence Project    Reason for Outreach Encounter: 3rd Party payor non-compliance report ([employer], [employer], C, etc)  2.  Patient Outreach Method: Reviewed patient chart   3.   Medication in question:    Diabetes Medications              metFORMIN (GLUCOPHAGE-XR) 500 MG ER 24hr tablet Take 1 tablet (500 mg total) by mouth 2 (two) times daily with meals.                 metformin  last filled  5/19 for 90 day supply      4.  Reviewed and or Updates Made To: Patient Chart  5. Outreach Outcomes and/or actions taken: Patient filled medication and is on track to be adherent  Additional Notes:

## 2025-07-09 ENCOUNTER — PATIENT OUTREACH (OUTPATIENT)
Dept: ADMINISTRATIVE | Facility: HOSPITAL | Age: 65
End: 2025-07-09
Payer: MEDICARE

## 2025-07-14 ENCOUNTER — PATIENT OUTREACH (OUTPATIENT)
Dept: ADMINISTRATIVE | Facility: HOSPITAL | Age: 65
End: 2025-07-14
Payer: MEDICARE

## 2025-07-14 NOTE — PROGRESS NOTES
Working BR A1C REPORT: Chart searched, Labs due: A1c, Microalbumin, lipid panel, CMP  ; Called pt to schedule labs: No answer, Mailbox is full. LM with pt's son.

## 2025-07-16 ENCOUNTER — PATIENT MESSAGE (OUTPATIENT)
Dept: INTERNAL MEDICINE | Facility: CLINIC | Age: 65
End: 2025-07-16
Payer: MEDICARE

## 2025-07-16 DIAGNOSIS — E11.36 TYPE 2 DIABETES MELLITUS WITH DIABETIC CATARACT, WITHOUT LONG-TERM CURRENT USE OF INSULIN: Primary | ICD-10-CM

## 2025-07-16 DIAGNOSIS — Z00.00 ENCOUNTER FOR MEDICARE ANNUAL WELLNESS EXAM: ICD-10-CM

## 2025-07-16 DIAGNOSIS — E11.69 HYPERLIPIDEMIA ASSOCIATED WITH TYPE 2 DIABETES MELLITUS: ICD-10-CM

## 2025-07-16 DIAGNOSIS — E04.2 MULTINODULAR THYROID: ICD-10-CM

## 2025-07-16 DIAGNOSIS — E78.5 HYPERLIPIDEMIA ASSOCIATED WITH TYPE 2 DIABETES MELLITUS: ICD-10-CM

## 2025-07-29 ENCOUNTER — PATIENT OUTREACH (OUTPATIENT)
Dept: ADMINISTRATIVE | Facility: HOSPITAL | Age: 65
End: 2025-07-29
Payer: MEDICARE

## 2025-07-30 ENCOUNTER — OFFICE VISIT (OUTPATIENT)
Dept: URGENT CARE | Facility: CLINIC | Age: 65
End: 2025-07-30
Payer: MEDICARE

## 2025-07-30 VITALS
BODY MASS INDEX: 31.71 KG/M2 | HEIGHT: 63 IN | SYSTOLIC BLOOD PRESSURE: 144 MMHG | TEMPERATURE: 99 F | WEIGHT: 179 LBS | RESPIRATION RATE: 16 BRPM | HEART RATE: 78 BPM | DIASTOLIC BLOOD PRESSURE: 77 MMHG | OXYGEN SATURATION: 95 %

## 2025-07-30 DIAGNOSIS — K04.7 DENTAL INFECTION: Primary | ICD-10-CM

## 2025-07-30 PROCEDURE — 99214 OFFICE O/P EST MOD 30 MIN: CPT | Mod: S$GLB,,, | Performed by: PHYSICIAN ASSISTANT

## 2025-07-30 RX ORDER — AMOXICILLIN 500 MG/1
500 TABLET, FILM COATED ORAL EVERY 12 HOURS
Qty: 20 TABLET | Refills: 0 | Status: SHIPPED | OUTPATIENT
Start: 2025-07-30 | End: 2025-08-09

## 2025-07-31 ENCOUNTER — PATIENT MESSAGE (OUTPATIENT)
Dept: INTERNAL MEDICINE | Facility: CLINIC | Age: 65
End: 2025-07-31
Payer: MEDICARE

## 2025-07-31 NOTE — PROGRESS NOTES
"Subjective:      Patient ID: Melita Romero is a 64 y.o. female.    Vitals:  height is 5' 3" (1.6 m) and weight is 81.2 kg (179 lb). Her oral temperature is 98.6 °F (37 °C). Her blood pressure is 144/77 (abnormal) and her pulse is 78. Her respiration is 16 and oxygen saturation is 95%.     Chief Complaint: Dental Pain    Patient present for oral pain. Patient fractured her left front tooth in June 2025. Oral surgery scheduled for Tuesday August 5. Administered Advil. She has increased pain to the gum line and feels that it might be infected.    Dental Pain   The dental pain is located in She's tooth (teeth) and palate. This is a new problem. The current episode started in the past 7 days. The problem has been gradually worsening. The pain is at a severity of 8/10. The pain is moderate. Associated symptoms include facial pain and thermal sensitivity. Pertinent negatives include no difficulty swallowing, fever, oral bleeding or sinus pressure. She has tried NSAIDs for the symptoms. The treatment provided no relief.       Constitution: Negative for fever.   HENT:  Positive for dental problem. Negative for sinus pressure.       Objective:     Physical Exam   Constitutional: She is oriented to person, place, and time. She appears well-developed.   HENT:   Head: Normocephalic and atraumatic.   Ears:   Right Ear: External ear normal.   Left Ear: External ear normal.   Nose: Nose normal.   Mouth/Throat: Oropharynx is clear and moist.       Eyes: Conjunctivae, EOM and lids are normal.   Neck: Neck supple.   Cardiovascular: Normal rate.   Pulmonary/Chest: Effort normal.   Musculoskeletal: Normal range of motion.         General: Normal range of motion.   Neurological: She is alert and oriented to person, place, and time.   Skin: Skin is warm, dry and intact.   Psychiatric: Her speech is normal and behavior is normal. Judgment and thought content normal.   Nursing note and vitals reviewed.      Assessment:     1. Dental " infection        Plan:   VSS. Patient non-toxic appearing. Discussed medication being prescribed.  Advised patient to follow up with oral surgeon.  Patient verbalized understanding, agrees with the plan, and is comfortable with discharge.      Dental infection  -     amoxicillin (AMOXIL) 500 MG Tab; Take 1 tablet (500 mg total) by mouth every 12 (twelve) hours. for 10 days  Dispense: 20 tablet; Refill: 0

## 2025-08-01 ENCOUNTER — TELEPHONE (OUTPATIENT)
Dept: PHARMACY | Facility: CLINIC | Age: 65
End: 2025-08-01
Payer: MEDICARE

## 2025-08-11 ENCOUNTER — PATIENT OUTREACH (OUTPATIENT)
Dept: ADMINISTRATIVE | Facility: HOSPITAL | Age: 65
End: 2025-08-11
Payer: MEDICARE

## 2025-08-19 ENCOUNTER — PATIENT OUTREACH (OUTPATIENT)
Dept: ADMINISTRATIVE | Facility: HOSPITAL | Age: 65
End: 2025-08-19
Payer: MEDICARE

## 2025-08-20 ENCOUNTER — TELEPHONE (OUTPATIENT)
Dept: SMOKING CESSATION | Facility: CLINIC | Age: 65
End: 2025-08-20
Payer: MEDICARE

## 2025-08-21 ENCOUNTER — LAB VISIT (OUTPATIENT)
Dept: LAB | Facility: HOSPITAL | Age: 65
End: 2025-08-21
Attending: FAMILY MEDICINE
Payer: MEDICARE

## 2025-08-21 DIAGNOSIS — E04.2 MULTINODULAR THYROID: ICD-10-CM

## 2025-08-21 DIAGNOSIS — E11.36 TYPE 2 DIABETES MELLITUS WITH DIABETIC CATARACT, WITHOUT LONG-TERM CURRENT USE OF INSULIN: ICD-10-CM

## 2025-08-21 DIAGNOSIS — E78.5 HYPERLIPIDEMIA ASSOCIATED WITH TYPE 2 DIABETES MELLITUS: ICD-10-CM

## 2025-08-21 DIAGNOSIS — E11.9 TYPE 2 DIABETES MELLITUS WITHOUT COMPLICATION: ICD-10-CM

## 2025-08-21 DIAGNOSIS — Z00.00 ENCOUNTER FOR MEDICARE ANNUAL WELLNESS EXAM: ICD-10-CM

## 2025-08-21 DIAGNOSIS — E11.69 HYPERLIPIDEMIA ASSOCIATED WITH TYPE 2 DIABETES MELLITUS: ICD-10-CM

## 2025-08-21 LAB
ABSOLUTE EOSINOPHIL (OHS): 0.17 K/UL
ABSOLUTE MONOCYTE (OHS): 0.5 K/UL (ref 0.3–1)
ABSOLUTE NEUTROPHIL COUNT (OHS): 4.12 K/UL (ref 1.8–7.7)
ALBUMIN SERPL BCP-MCNC: 4.1 G/DL (ref 3.5–5.2)
ALBUMIN/CREAT UR: 15.8 UG/MG
ALP SERPL-CCNC: 63 UNIT/L (ref 40–150)
ALT SERPL W/O P-5'-P-CCNC: 18 UNIT/L (ref 0–55)
ANION GAP (OHS): 11 MMOL/L (ref 8–16)
AST SERPL-CCNC: 28 UNIT/L (ref 0–50)
BASOPHILS # BLD AUTO: 0.06 K/UL
BASOPHILS NFR BLD AUTO: 0.9 %
BILIRUB SERPL-MCNC: 0.2 MG/DL (ref 0.1–1)
BUN SERPL-MCNC: 12 MG/DL (ref 8–23)
CALCIUM SERPL-MCNC: 9.8 MG/DL (ref 8.7–10.5)
CHLORIDE SERPL-SCNC: 102 MMOL/L (ref 95–110)
CHOLEST SERPL-MCNC: 190 MG/DL (ref 120–199)
CHOLEST/HDLC SERPL: 3.7 {RATIO} (ref 2–5)
CO2 SERPL-SCNC: 29 MMOL/L (ref 23–29)
CREAT SERPL-MCNC: 0.8 MG/DL (ref 0.5–1.4)
CREAT UR-MCNC: 76 MG/DL (ref 15–325)
EAG (OHS): 111 MG/DL (ref 68–131)
ERYTHROCYTE [DISTWIDTH] IN BLOOD BY AUTOMATED COUNT: 12.6 % (ref 11.5–14.5)
GFR SERPLBLD CREATININE-BSD FMLA CKD-EPI: >60 ML/MIN/1.73/M2
GLUCOSE SERPL-MCNC: 103 MG/DL (ref 70–110)
HBA1C MFR BLD: 5.5 % (ref 4–5.6)
HCT VFR BLD AUTO: 40.7 % (ref 37–48.5)
HDLC SERPL-MCNC: 51 MG/DL (ref 40–75)
HDLC SERPL: 26.8 % (ref 20–50)
HGB BLD-MCNC: 13.8 GM/DL (ref 12–16)
IMM GRANULOCYTES # BLD AUTO: 0.02 K/UL (ref 0–0.04)
IMM GRANULOCYTES NFR BLD AUTO: 0.3 % (ref 0–0.5)
LDLC SERPL CALC-MCNC: 107.2 MG/DL (ref 63–159)
LYMPHOCYTES # BLD AUTO: 1.84 K/UL (ref 1–4.8)
MCH RBC QN AUTO: 32.3 PG (ref 27–31)
MCHC RBC AUTO-ENTMCNC: 33.9 G/DL (ref 32–36)
MCV RBC AUTO: 95 FL (ref 82–98)
MICROALBUMIN UR-MCNC: 12 UG/ML
NONHDLC SERPL-MCNC: 139 MG/DL
NUCLEATED RBC (/100WBC) (OHS): 0 /100 WBC
PLATELET # BLD AUTO: 306 K/UL (ref 150–450)
PMV BLD AUTO: 9.5 FL (ref 9.2–12.9)
POTASSIUM SERPL-SCNC: 4.2 MMOL/L (ref 3.5–5.1)
PROT SERPL-MCNC: 6.9 GM/DL (ref 6–8.4)
RBC # BLD AUTO: 4.27 M/UL (ref 4–5.4)
RELATIVE EOSINOPHIL (OHS): 2.5 %
RELATIVE LYMPHOCYTE (OHS): 27.4 % (ref 18–48)
RELATIVE MONOCYTE (OHS): 7.5 % (ref 4–15)
RELATIVE NEUTROPHIL (OHS): 61.4 % (ref 38–73)
SODIUM SERPL-SCNC: 142 MMOL/L (ref 136–145)
TRIGL SERPL-MCNC: 159 MG/DL (ref 30–150)
TSH SERPL-ACNC: 1.89 UIU/ML (ref 0.4–4)
WBC # BLD AUTO: 6.71 K/UL (ref 3.9–12.7)

## 2025-08-21 PROCEDURE — 36415 COLL VENOUS BLD VENIPUNCTURE: CPT | Mod: HCNC,PO

## 2025-08-21 PROCEDURE — 85025 COMPLETE CBC W/AUTO DIFF WBC: CPT | Mod: HCNC

## 2025-08-21 PROCEDURE — 83036 HEMOGLOBIN GLYCOSYLATED A1C: CPT | Mod: HCNC

## 2025-08-21 PROCEDURE — 82435 ASSAY OF BLOOD CHLORIDE: CPT | Mod: HCNC

## 2025-08-21 PROCEDURE — 80061 LIPID PANEL: CPT | Mod: HCNC

## 2025-08-21 PROCEDURE — 82570 ASSAY OF URINE CREATININE: CPT | Mod: HCNC

## 2025-08-21 PROCEDURE — 84443 ASSAY THYROID STIM HORMONE: CPT | Mod: HCNC

## 2025-08-25 ENCOUNTER — OFFICE VISIT (OUTPATIENT)
Dept: INTERNAL MEDICINE | Facility: CLINIC | Age: 65
End: 2025-08-25
Payer: MEDICARE

## 2025-08-25 VITALS
WEIGHT: 187.63 LBS | OXYGEN SATURATION: 96 % | BODY MASS INDEX: 33.25 KG/M2 | RESPIRATION RATE: 17 BRPM | TEMPERATURE: 99 F | HEIGHT: 63 IN | HEART RATE: 88 BPM | SYSTOLIC BLOOD PRESSURE: 138 MMHG | DIASTOLIC BLOOD PRESSURE: 82 MMHG

## 2025-08-25 DIAGNOSIS — F17.200 TOBACCO DEPENDENCE: ICD-10-CM

## 2025-08-25 DIAGNOSIS — E11.9 ENCOUNTER FOR DIABETIC FOOT EXAM: ICD-10-CM

## 2025-08-25 DIAGNOSIS — Z00.00 ROUTINE GENERAL MEDICAL EXAMINATION AT A HEALTH CARE FACILITY: Primary | ICD-10-CM

## 2025-08-25 DIAGNOSIS — I70.0 AORTIC ATHEROSCLEROSIS: ICD-10-CM

## 2025-08-25 DIAGNOSIS — E11.59 HYPERTENSION ASSOCIATED WITH DIABETES: ICD-10-CM

## 2025-08-25 DIAGNOSIS — Z12.11 COLON CANCER SCREENING: ICD-10-CM

## 2025-08-25 DIAGNOSIS — F41.1 GAD (GENERALIZED ANXIETY DISORDER): ICD-10-CM

## 2025-08-25 DIAGNOSIS — E78.5 HYPERLIPIDEMIA ASSOCIATED WITH TYPE 2 DIABETES MELLITUS: ICD-10-CM

## 2025-08-25 DIAGNOSIS — E11.69 HYPERLIPIDEMIA ASSOCIATED WITH TYPE 2 DIABETES MELLITUS: ICD-10-CM

## 2025-08-25 DIAGNOSIS — E11.36 TYPE 2 DIABETES MELLITUS WITH DIABETIC CATARACT, WITHOUT LONG-TERM CURRENT USE OF INSULIN: ICD-10-CM

## 2025-08-25 DIAGNOSIS — N39.46 MIXED STRESS AND URGE URINARY INCONTINENCE: ICD-10-CM

## 2025-08-25 DIAGNOSIS — J44.9 CHRONIC OBSTRUCTIVE PULMONARY DISEASE, UNSPECIFIED COPD TYPE: ICD-10-CM

## 2025-08-25 DIAGNOSIS — E55.9 VITAMIN D DEFICIENCY DISEASE: ICD-10-CM

## 2025-08-25 DIAGNOSIS — K21.9 GASTROESOPHAGEAL REFLUX DISEASE, UNSPECIFIED WHETHER ESOPHAGITIS PRESENT: ICD-10-CM

## 2025-08-25 DIAGNOSIS — Z12.31 BREAST CANCER SCREENING BY MAMMOGRAM: ICD-10-CM

## 2025-08-25 DIAGNOSIS — F33.9 MAJOR DEPRESSION, RECURRENT, CHRONIC: ICD-10-CM

## 2025-08-25 DIAGNOSIS — G89.4 CHRONIC PAIN SYNDROME: ICD-10-CM

## 2025-08-25 DIAGNOSIS — I15.2 HYPERTENSION ASSOCIATED WITH DIABETES: ICD-10-CM

## 2025-08-25 PROCEDURE — 99999 PR PBB SHADOW E&M-EST. PATIENT-LVL V: CPT | Mod: PBBFAC,HCNC,,

## 2025-08-26 ENCOUNTER — HOSPITAL ENCOUNTER (OUTPATIENT)
Dept: PREADMISSION TESTING | Facility: HOSPITAL | Age: 65
Discharge: HOME | End: 2025-08-26
Attending: INTERNAL MEDICINE
Payer: MEDICARE

## 2025-08-26 DIAGNOSIS — Z12.11 COLON CANCER SCREENING: Primary | ICD-10-CM

## 2025-08-26 RX ORDER — SODIUM, POTASSIUM,MAG SULFATES 17.5-3.13G
1 SOLUTION, RECONSTITUTED, ORAL ORAL DAILY
Qty: 1 KIT | Refills: 0 | Status: SHIPPED | OUTPATIENT
Start: 2025-08-26 | End: 2025-08-28

## 2025-08-28 ENCOUNTER — TELEPHONE (OUTPATIENT)
Dept: SMOKING CESSATION | Facility: CLINIC | Age: 65
End: 2025-08-28
Payer: MEDICARE

## 2025-08-28 RX ORDER — TIZANIDINE 4 MG/1
4 TABLET ORAL EVERY 8 HOURS
Qty: 90 TABLET | Refills: 0 | Status: SHIPPED | OUTPATIENT
Start: 2025-08-28

## 2025-09-02 ENCOUNTER — TELEPHONE (OUTPATIENT)
Dept: PSYCHIATRY | Facility: CLINIC | Age: 65
End: 2025-09-02
Payer: MEDICARE